# Patient Record
Sex: FEMALE | Race: WHITE | NOT HISPANIC OR LATINO | Employment: OTHER | URBAN - NONMETROPOLITAN AREA
[De-identification: names, ages, dates, MRNs, and addresses within clinical notes are randomized per-mention and may not be internally consistent; named-entity substitution may affect disease eponyms.]

---

## 2024-05-10 ENCOUNTER — APPOINTMENT (EMERGENCY)
Dept: RADIOLOGY | Facility: HOSPITAL | Age: 79
DRG: 811 | End: 2024-05-10
Payer: MEDICARE

## 2024-05-10 ENCOUNTER — APPOINTMENT (EMERGENCY)
Dept: CT IMAGING | Facility: HOSPITAL | Age: 79
DRG: 811 | End: 2024-05-10
Payer: MEDICARE

## 2024-05-10 ENCOUNTER — HOSPITAL ENCOUNTER (INPATIENT)
Facility: HOSPITAL | Age: 79
LOS: 7 days | Discharge: HOME | DRG: 811 | End: 2024-05-17
Attending: EMERGENCY MEDICINE | Admitting: INTERNAL MEDICINE
Payer: MEDICARE

## 2024-05-10 ENCOUNTER — APPOINTMENT (INPATIENT)
Dept: NON INVASIVE DIAGNOSTICS | Facility: HOSPITAL | Age: 79
DRG: 811 | End: 2024-05-10
Payer: MEDICARE

## 2024-05-10 ENCOUNTER — APPOINTMENT (INPATIENT)
Dept: MRI IMAGING | Facility: HOSPITAL | Age: 79
DRG: 811 | End: 2024-05-10
Payer: MEDICARE

## 2024-05-10 DIAGNOSIS — R29.898 WEAKNESS OF RIGHT LOWER EXTREMITY: Primary | ICD-10-CM

## 2024-05-10 DIAGNOSIS — D75.839 THROMBOCYTOSIS: ICD-10-CM

## 2024-05-10 DIAGNOSIS — I63.9 STROKE (HCC): ICD-10-CM

## 2024-05-10 DIAGNOSIS — D64.9 ANEMIA: ICD-10-CM

## 2024-05-10 DIAGNOSIS — D64.9 ACUTE ANEMIA: ICD-10-CM

## 2024-05-10 LAB
2HR DELTA HS TROPONIN: 2 NG/L
4HR DELTA HS TROPONIN: 2 NG/L
ABO GROUP BLD: NORMAL
ABO GROUP BLD: NORMAL
ALBUMIN SERPL BCP-MCNC: 4 G/DL (ref 3.5–5)
ALP SERPL-CCNC: 68 U/L (ref 34–104)
ALT SERPL W P-5'-P-CCNC: 11 U/L (ref 7–52)
ANION GAP SERPL CALCULATED.3IONS-SCNC: 10 MMOL/L (ref 4–13)
AORTIC ROOT: 2.9 CM
AORTIC VALVE MEAN VELOCITY: 14.4 M/S
APICAL FOUR CHAMBER EJECTION FRACTION: 57 %
ASCENDING AORTA: 3.1 CM
AST SERPL W P-5'-P-CCNC: 16 U/L (ref 13–39)
ATRIAL RATE: 88 BPM
AV AREA BY CONTINUOUS VTI: 1.8 CM2
AV AREA PEAK VELOCITY: 1.5 CM2
AV LVOT MEAN GRADIENT: 2 MMHG
AV LVOT PEAK GRADIENT: 4 MMHG
AV MEAN GRADIENT: 10 MMHG
AV PEAK GRADIENT: 20 MMHG
AV VALVE AREA: 1.78 CM2
AV VELOCITY RATIO: 0.46
BASOPHILS # BLD AUTO: 0.07 THOUSANDS/ÂΜL (ref 0–0.1)
BASOPHILS NFR BLD AUTO: 1 % (ref 0–1)
BILIRUB SERPL-MCNC: 0.29 MG/DL (ref 0.2–1)
BLD GP AB SCN SERPL QL: NEGATIVE
BSA FOR ECHO PROCEDURE: 1.52 M2
BUN SERPL-MCNC: 12 MG/DL (ref 5–25)
CALCIUM SERPL-MCNC: 9.4 MG/DL (ref 8.4–10.2)
CARDIAC TROPONIN I PNL SERPL HS: 4 NG/L
CARDIAC TROPONIN I PNL SERPL HS: 6 NG/L
CARDIAC TROPONIN I PNL SERPL HS: 6 NG/L
CHLORIDE SERPL-SCNC: 104 MMOL/L (ref 96–108)
CO2 SERPL-SCNC: 24 MMOL/L (ref 21–32)
CREAT SERPL-MCNC: 0.93 MG/DL (ref 0.6–1.3)
DOP CALC AO PEAK VEL: 2.22 M/S
DOP CALC AO VTI: 48.37 CM
DOP CALC LVOT AREA: 3.14 CM2
DOP CALC LVOT CARDIAC INDEX: 4.76 L/MIN/M2
DOP CALC LVOT CARDIAC OUTPUT: 7.24 L/MIN
DOP CALC LVOT DIAMETER: 2 CM
DOP CALC LVOT PEAK VEL VTI: 27.38 CM
DOP CALC LVOT PEAK VEL: 1.03 M/S
DOP CALC LVOT STROKE INDEX: 57.9 ML/M2
DOP CALC LVOT STROKE VOLUME: 85.97
E WAVE DECELERATION TIME: 168 MS
E/A RATIO: 0.95
EOSINOPHIL # BLD AUTO: 0.14 THOUSAND/ÂΜL (ref 0–0.61)
EOSINOPHIL NFR BLD AUTO: 2 % (ref 0–6)
ERYTHROCYTE [DISTWIDTH] IN BLOOD BY AUTOMATED COUNT: 17.8 % (ref 11.6–15.1)
FERRITIN SERPL-MCNC: 19 NG/ML (ref 11–307)
FOLATE SERPL-MCNC: >22.3 NG/ML
FRACTIONAL SHORTENING: 27 (ref 28–44)
GFR SERPL CREATININE-BSD FRML MDRD: 58 ML/MIN/1.73SQ M
GLUCOSE SERPL-MCNC: 126 MG/DL (ref 65–140)
HCT VFR BLD AUTO: 20.8 % (ref 34.8–46.1)
HCT VFR BLD AUTO: 24.5 % (ref 34.8–46.1)
HGB BLD-MCNC: 5.8 G/DL (ref 11.5–15.4)
HGB BLD-MCNC: 6.7 G/DL (ref 11.5–15.4)
IMM GRANULOCYTES # BLD AUTO: 0.04 THOUSAND/UL (ref 0–0.2)
IMM GRANULOCYTES NFR BLD AUTO: 0 % (ref 0–2)
INTERVENTRICULAR SEPTUM IN DIASTOLE (PARASTERNAL SHORT AXIS VIEW): 1.2 CM
INTERVENTRICULAR SEPTUM: 1 CM (ref 0.6–1.1)
IRON SATN MFR SERPL: 2 % (ref 15–50)
IRON SERPL-MCNC: 12 UG/DL (ref 50–212)
LAAS-AP2: 21.5 CM2
LAAS-AP4: 18.7 CM2
LDH SERPL-CCNC: 192 U/L (ref 140–271)
LEFT ATRIUM SIZE: 3 CM
LEFT ATRIUM VOLUME (MOD BIPLANE): 58 ML
LEFT ATRIUM VOLUME INDEX (MOD BIPLANE): 38.2 ML/M2
LEFT INTERNAL DIMENSION IN SYSTOLE: 2.7 CM (ref 2.1–4)
LEFT VENTRICLE DIASTOLIC VOLUME (MOD BIPLANE): 73 ML
LEFT VENTRICLE DIASTOLIC VOLUME INDEX (MOD BIPLANE): 48 ML/M2
LEFT VENTRICLE SYSTOLIC VOLUME (MOD BIPLANE): 27 ML
LEFT VENTRICLE SYSTOLIC VOLUME INDEX (MOD BIPLANE): 17.8 ML/M2
LEFT VENTRICULAR INTERNAL DIMENSION IN DIASTOLE: 3.7 CM (ref 3.5–6)
LEFT VENTRICULAR POSTERIOR WALL IN END DIASTOLE: 1.2 CM
LEFT VENTRICULAR STROKE VOLUME: 29 ML
LV EF: 63 %
LVSV (TEICH): 29 ML
LYMPHOCYTES # BLD AUTO: 1.21 THOUSANDS/ÂΜL (ref 0.6–4.47)
LYMPHOCYTES NFR BLD AUTO: 13 % (ref 14–44)
MCH RBC QN AUTO: 19.1 PG (ref 26.8–34.3)
MCHC RBC AUTO-ENTMCNC: 27.3 G/DL (ref 31.4–37.4)
MCV RBC AUTO: 70 FL (ref 82–98)
MONOCYTES # BLD AUTO: 0.55 THOUSAND/ÂΜL (ref 0.17–1.22)
MONOCYTES NFR BLD AUTO: 6 % (ref 4–12)
MV E'TISSUE VEL-LAT: 7 CM/S
MV E'TISSUE VEL-SEP: 7 CM/S
MV PEAK A VEL: 1.19 M/S
MV PEAK E VEL: 113 CM/S
MV STENOSIS PRESSURE HALF TIME: 49 MS
MV VALVE AREA P 1/2 METHOD: 4.49
NEUTROPHILS # BLD AUTO: 7.64 THOUSANDS/ÂΜL (ref 1.85–7.62)
NEUTS SEG NFR BLD AUTO: 78 % (ref 43–75)
NRBC BLD AUTO-RTO: 0 /100 WBCS
P AXIS: 66 DEGREES
PLATELET # BLD AUTO: 779 THOUSANDS/UL (ref 149–390)
PMV BLD AUTO: 9.1 FL (ref 8.9–12.7)
POTASSIUM SERPL-SCNC: 3.8 MMOL/L (ref 3.5–5.3)
PR INTERVAL: 132 MS
PROT SERPL-MCNC: 7.5 G/DL (ref 6.4–8.4)
QRS AXIS: 49 DEGREES
QRSD INTERVAL: 78 MS
QT INTERVAL: 352 MS
QTC INTERVAL: 425 MS
RBC # BLD AUTO: 3.51 MILLION/UL (ref 3.81–5.12)
RETICS # AUTO: NORMAL 10*3/UL (ref 14097–95744)
RETICS # CALC: 1.28 % (ref 0.37–1.87)
RH BLD: POSITIVE
RH BLD: POSITIVE
RIGHT ATRIUM AREA SYSTOLE A4C: 17.3 CM2
RIGHT VENTRICLE ID DIMENSION: 4 CM
SINOTUBULAR JUNCTION: 1.9 CM
SL CV LEFT ATRIUM LENGTH A2C: 5.9 CM
SL CV PED ECHO LEFT VENTRICLE DIASTOLIC VOLUME (MOD BIPLANE) 2D: 57 ML
SL CV PED ECHO LEFT VENTRICLE SYSTOLIC VOLUME (MOD BIPLANE) 2D: 28 ML
SL CV SINUS OF VALSALVA 2D: 2.8 CM
SODIUM SERPL-SCNC: 138 MMOL/L (ref 135–147)
SPECIMEN EXPIRATION DATE: NORMAL
STJ: 1.9 CM
T WAVE AXIS: 34 DEGREES
TIBC SERPL-MCNC: 682 UG/DL (ref 250–450)
TR MAX PG: 37 MMHG
TR PEAK VELOCITY: 3 M/S
TRICUSPID ANNULAR PLANE SYSTOLIC EXCURSION: 2.4 CM
TRICUSPID VALVE PEAK REGURGITATION VELOCITY: 3.04 M/S
UIBC SERPL-MCNC: 670 UG/DL (ref 155–355)
VENTRICULAR RATE: 88 BPM
VIT B12 SERPL-MCNC: 878 PG/ML (ref 180–914)
WBC # BLD AUTO: 9.65 THOUSAND/UL (ref 4.31–10.16)

## 2024-05-10 PROCEDURE — 74177 CT ABD & PELVIS W/CONTRAST: CPT

## 2024-05-10 PROCEDURE — 80053 COMPREHEN METABOLIC PANEL: CPT | Performed by: EMERGENCY MEDICINE

## 2024-05-10 PROCEDURE — 70498 CT ANGIOGRAPHY NECK: CPT

## 2024-05-10 PROCEDURE — 99223 1ST HOSP IP/OBS HIGH 75: CPT | Performed by: INTERNAL MEDICINE

## 2024-05-10 PROCEDURE — 71045 X-RAY EXAM CHEST 1 VIEW: CPT

## 2024-05-10 PROCEDURE — 82607 VITAMIN B-12: CPT | Performed by: INTERNAL MEDICINE

## 2024-05-10 PROCEDURE — 85045 AUTOMATED RETICULOCYTE COUNT: CPT | Performed by: INTERNAL MEDICINE

## 2024-05-10 PROCEDURE — 83615 LACTATE (LD) (LDH) ENZYME: CPT | Performed by: INTERNAL MEDICINE

## 2024-05-10 PROCEDURE — 93005 ELECTROCARDIOGRAM TRACING: CPT

## 2024-05-10 PROCEDURE — 85014 HEMATOCRIT: CPT | Performed by: EMERGENCY MEDICINE

## 2024-05-10 PROCEDURE — A9585 GADOBUTROL INJECTION: HCPCS | Performed by: EMERGENCY MEDICINE

## 2024-05-10 PROCEDURE — 84484 ASSAY OF TROPONIN QUANT: CPT | Performed by: EMERGENCY MEDICINE

## 2024-05-10 PROCEDURE — 36415 COLL VENOUS BLD VENIPUNCTURE: CPT | Performed by: EMERGENCY MEDICINE

## 2024-05-10 PROCEDURE — 70496 CT ANGIOGRAPHY HEAD: CPT

## 2024-05-10 PROCEDURE — 86923 COMPATIBILITY TEST ELECTRIC: CPT

## 2024-05-10 PROCEDURE — P9016 RBC LEUKOCYTES REDUCED: HCPCS

## 2024-05-10 PROCEDURE — 86901 BLOOD TYPING SEROLOGIC RH(D): CPT | Performed by: EMERGENCY MEDICINE

## 2024-05-10 PROCEDURE — 93010 ELECTROCARDIOGRAM REPORT: CPT | Performed by: INTERNAL MEDICINE

## 2024-05-10 PROCEDURE — 70553 MRI BRAIN STEM W/O & W/DYE: CPT

## 2024-05-10 PROCEDURE — 99285 EMERGENCY DEPT VISIT HI MDM: CPT

## 2024-05-10 PROCEDURE — 85025 COMPLETE CBC W/AUTO DIFF WBC: CPT | Performed by: EMERGENCY MEDICINE

## 2024-05-10 PROCEDURE — 82728 ASSAY OF FERRITIN: CPT | Performed by: INTERNAL MEDICINE

## 2024-05-10 PROCEDURE — 85018 HEMOGLOBIN: CPT | Performed by: EMERGENCY MEDICINE

## 2024-05-10 PROCEDURE — 83540 ASSAY OF IRON: CPT | Performed by: INTERNAL MEDICINE

## 2024-05-10 PROCEDURE — 86850 RBC ANTIBODY SCREEN: CPT | Performed by: EMERGENCY MEDICINE

## 2024-05-10 PROCEDURE — 83010 ASSAY OF HAPTOGLOBIN QUANT: CPT | Performed by: INTERNAL MEDICINE

## 2024-05-10 PROCEDURE — 83550 IRON BINDING TEST: CPT | Performed by: INTERNAL MEDICINE

## 2024-05-10 PROCEDURE — 30233N1 TRANSFUSION OF NONAUTOLOGOUS RED BLOOD CELLS INTO PERIPHERAL VEIN, PERCUTANEOUS APPROACH: ICD-10-PCS | Performed by: EMERGENCY MEDICINE

## 2024-05-10 PROCEDURE — 86900 BLOOD TYPING SEROLOGIC ABO: CPT | Performed by: EMERGENCY MEDICINE

## 2024-05-10 PROCEDURE — 99291 CRITICAL CARE FIRST HOUR: CPT | Performed by: EMERGENCY MEDICINE

## 2024-05-10 PROCEDURE — 82746 ASSAY OF FOLIC ACID SERUM: CPT | Performed by: INTERNAL MEDICINE

## 2024-05-10 PROCEDURE — 93306 TTE W/DOPPLER COMPLETE: CPT | Performed by: INTERNAL MEDICINE

## 2024-05-10 PROCEDURE — 93306 TTE W/DOPPLER COMPLETE: CPT

## 2024-05-10 PROCEDURE — 36430 TRANSFUSION BLD/BLD COMPNT: CPT

## 2024-05-10 RX ORDER — ATORVASTATIN CALCIUM 40 MG/1
40 TABLET, FILM COATED ORAL EVERY EVENING
Status: DISCONTINUED | OUTPATIENT
Start: 2024-05-10 | End: 2024-05-17 | Stop reason: HOSPADM

## 2024-05-10 RX ORDER — GADOBUTROL 604.72 MG/ML
5 INJECTION INTRAVENOUS
Status: COMPLETED | OUTPATIENT
Start: 2024-05-10 | End: 2024-05-10

## 2024-05-10 RX ORDER — ASPIRIN 81 MG/1
81 TABLET, CHEWABLE ORAL DAILY
Status: DISCONTINUED | OUTPATIENT
Start: 2024-05-11 | End: 2024-05-17 | Stop reason: HOSPADM

## 2024-05-10 RX ORDER — ACETAMINOPHEN 325 MG/1
650 TABLET ORAL EVERY 4 HOURS PRN
Status: DISCONTINUED | OUTPATIENT
Start: 2024-05-10 | End: 2024-05-17 | Stop reason: HOSPADM

## 2024-05-10 RX ADMIN — GADOBUTROL 5 ML: 604.72 INJECTION INTRAVENOUS at 18:34

## 2024-05-10 RX ADMIN — ATORVASTATIN CALCIUM 40 MG: 40 TABLET, FILM COATED ORAL at 17:42

## 2024-05-10 RX ADMIN — IOHEXOL 100 ML: 350 INJECTION, SOLUTION INTRAVENOUS at 10:42

## 2024-05-10 NOTE — ED PROVIDER NOTES
"History  Chief Complaint   Patient presents with    Extremity Weakness     R leg and L hand since 5/6     Patient is a 79-year-old female who has had no follow-up with primary care provider/physician in the past who is presenting to the emergency room with family reporting that patient suffered from right-sided weakness since Monday while on a cruise.  Daughter who is bedside provides most the history reports that the patient had some upper extremity stiffness bilaterally on Sunday evening.  She woke on Monday and had some nausea and vomiting and then was able to go to breakfast but had difficulty cutting her food and when she stood up she fell over to with the daughter reports as \"the right.\"  Since that time she was in a wheelchair.  Returning home she has been needing to use a walker because she reports that her right lower extremity is \"dragging.\"  This is not normal for the patient who is normally independent without a walker.      History provided by:  Patient and relative  Extremity Weakness  Location:  Right lower extremity  Associated symptoms: no headaches        None       Past Medical History:   Diagnosis Date    Cataracts, bilateral        Past Surgical History:   Procedure Laterality Date    CATARACT EXTRACTION Bilateral        No family history on file.  I have reviewed and agree with the history as documented.    E-Cigarette/Vaping    E-Cigarette Use Never User      E-Cigarette/Vaping Substances     Social History     Tobacco Use    Smoking status: Never    Smokeless tobacco: Never   Vaping Use    Vaping status: Never Used   Substance Use Topics    Alcohol use: Never    Drug use: Never       Review of Systems   Constitutional: Negative.    HENT: Negative.     Respiratory: Negative.     Cardiovascular: Negative.    Gastrointestinal: Negative.    Genitourinary: Negative.  Negative for frequency, hematuria and urgency.   Musculoskeletal:  Positive for extremity weakness. Negative for back pain. "   Neurological:  Positive for weakness. Negative for dizziness, tremors, seizures, syncope, facial asymmetry, speech difficulty, light-headedness, numbness and headaches.       Physical Exam  Physical Exam  Vitals and nursing note reviewed. Exam conducted with a chaperone present (Latonya, Tech Partner).   Constitutional:       General: She is awake.      Appearance: Normal appearance. She is well-developed. She is not ill-appearing or toxic-appearing.   HENT:      Head: Normocephalic and atraumatic.      Right Ear: External ear normal.      Left Ear: External ear normal.      Nose: Nose normal.      Mouth/Throat:      Mouth: Mucous membranes are moist.   Eyes:      General: Lids are normal. No scleral icterus.     Extraocular Movements: Extraocular movements intact.      Pupils: Pupils are equal, round, and reactive to light.   Cardiovascular:      Rate and Rhythm: Normal rate and regular rhythm.      Heart sounds: Normal heart sounds. No murmur heard.  Pulmonary:      Effort: Pulmonary effort is normal. No respiratory distress.      Breath sounds: Normal breath sounds. No wheezing, rhonchi or rales.   Abdominal:      General: Abdomen is flat.      Palpations: Abdomen is soft.   Genitourinary:     Rectum: Guaiac result negative.   Musculoskeletal:         General: No swelling, tenderness or deformity. Normal range of motion.      Cervical back: Normal range of motion and neck supple.      Lumbar back: Normal.      Comments: No lumbar tenderness or pain.   Skin:     General: Skin is warm and dry.      Coloration: Skin is not jaundiced or pale.      Findings: No rash.   Neurological:      Mental Status: She is alert and oriented to person, place, and time.      Cranial Nerves: No cranial nerve deficit.      Motor: Weakness present.      Coordination: Coordination abnormal.      Comments: Mild ataxia and weakness of the right lower extremity.   Psychiatric:         Attention and Perception: Attention normal.          Mood and Affect: Mood normal.         Speech: Speech normal.         Behavior: Behavior normal.         Vital Signs  ED Triage Vitals [05/10/24 0909]   Temperature Pulse Respirations Blood Pressure SpO2   98.4 °F (36.9 °C) (!) 112 18 (!) 174/78 97 %      Temp Source Heart Rate Source Patient Position - Orthostatic VS BP Location FiO2 (%)   Temporal Monitor Lying Right arm --      Pain Score       7           Vitals:    05/10/24 0915 05/10/24 0930 05/10/24 1015 05/10/24 1030   BP: 163/59 158/72 163/71 157/56   Pulse: 101 95 93 90   Patient Position - Orthostatic VS:             Visual Acuity  Visual Acuity      Flowsheet Row Most Recent Value   L Pupil Size (mm) 3   R Pupil Size (mm) 3            ED Medications  Medications   iohexol (OMNIPAQUE) 350 MG/ML injection (MULTI-DOSE) 100 mL (100 mL Intravenous Given 5/10/24 1042)       Diagnostic Studies  Results Reviewed       Procedure Component Value Units Date/Time    HS Troponin I 2hr [[id number]]  (Normal) Collected: 05/10/24 1150    Lab Status: Final result Specimen: Blood from Arm, Left Updated: 05/10/24 1229     hs TnI 2hr 6 ng/L      Delta 2hr hsTnI 2 ng/L     HS Troponin I 4hr [[id number]]     Lab Status: No result Specimen: Blood     Hemoglobin and hematocrit, blood [[id number]]  (Abnormal) Collected: 05/10/24 1053    Lab Status: Final result Specimen: Blood from Arm, Right Updated: 05/10/24 1107     Hemoglobin 5.8 g/dL      Hematocrit 20.8 %     HS Troponin 0hr (reflex protocol) [[id number]]  (Normal) Collected: 05/10/24 0959    Lab Status: Final result Specimen: Blood from Arm, Right Updated: 05/10/24 1031     hs TnI 0hr 4 ng/L     Comprehensive metabolic panel [[id number]] Collected: 05/10/24 0959    Lab Status: Final result Specimen: Blood from Arm, Right Updated: 05/10/24 1025     Sodium 138 mmol/L      Potassium 3.8 mmol/L      Chloride 104 mmol/L      CO2 24 mmol/L      ANION GAP 10 mmol/L      BUN 12 mg/dL      Creatinine 0.93 mg/dL      Glucose 126 mg/dL       Calcium 9.4 mg/dL      AST 16 U/L      ALT 11 U/L      Alkaline Phosphatase 68 U/L      Total Protein 7.5 g/dL      Albumin 4.0 g/dL      Total Bilirubin 0.29 mg/dL      eGFR 58 ml/min/1.73sq m     Narrative:      National Kid[employer] Foundation guidelines for Chronic Kidney Disease (CKD):     Stage 1 with normal or high GFR (GFR > 90 mL/min/1.73 square meters)    Stage 2 Mild CKD (GFR = 60-89 mL/min/1.73 square meters)    Stage 3A Moderate CKD (GFR = 45-59 mL/min/1.73 square meters)    Stage 3B Moderate CKD (GFR = 30-44 mL/min/1.73 square meters)    Stage 4 Severe CKD (GFR = 15-29 mL/min/1.73 square meters)    Stage 5 End Stage CKD (GFR <15 mL/min/1.73 square meters)  Note: GFR calculation is accurate only with a steady state creatinine    CBC and differential [[id number]]  (Abnormal) Collected: 05/10/24 0959    Lab Status: Final result Specimen: Blood from Arm, Right Updated: 05/10/24 1020     WBC 9.65 Thousand/uL      RBC 3.51 Million/uL      Hemoglobin 6.7 g/dL      Hematocrit 24.5 %      MCV 70 fL      MCH 19.1 pg      MCHC 27.3 g/dL      RDW 17.8 %      MPV 9.1 fL      Platelets 779 Thousands/uL      nRBC 0 /100 WBCs      Segmented % 78 %      Immature Grans % 0 %      Lymphocytes % 13 %      Monocytes % 6 %      Eosinophils Relative 2 %      Basophils Relative 1 %      Absolute Neutrophils 7.64 Thousands/µL      Absolute Immature Grans 0.04 Thousand/uL      Absolute Lymphocytes 1.21 Thousands/µL      Absolute Monocytes 0.55 Thousand/µL      Eosinophils Absolute 0.14 Thousand/µL      Basophils Absolute 0.07 Thousands/µL                    CTA head and neck with and without contrast   Final Result by Willis Robison MD (05/10 4178)      CT head:   -Hypodensities within the left parietal lobe and left cerebellar hemisphere may represent age-indeterminate infarct. Recommend MRI of the brain for further evaluation.   -Chronic microangiopathic changes.      CTA head:   -No large vessel occlusion. Question  moderate to severe right and moderate left supraclinoid stenosis, possibly exaggerated due to motion.   -Focal moderate to severe stenosis involving the proximal right P2 segment of the PCA.      CTA neck:   -Severe stenosis at the origin of the left vertebral artery.   - Approximately 50% left and less than 50% right stenosis of the proximal internal carotid arteries due to atherosclerosis.      Other:   -Spondylosis of the cervical spine including grade 1 listhesis as above.   -Mediastinal and bilateral hilar adenopathy, nonspecific. Clinical correlation and dedicated chest CT advised.         The study was marked in EPIC for immediate notification.               Workstation performed: [id number]         CT abdomen pelvis with contrast   Final Result by Angelo Dunlap MD (05/10 1113)      No acute finding in the abdomen or pelvis.         Workstation performed: [id number]         XR chest 1 view portable   ED Interpretation by Figueroa Robles DO (05/10 1033)   No active disease      MRI ED order    (Results Pending)              Procedures  Procedures         ED Course  ED Course as of 05/10/24 1240   Fri May 10, 2024   1100 Hemoglobin(!): 6.7   1107 Hemoglobin retest is 5.8.  Guaiac negative  No clear etiology for bleeding at this time.  Will consent patient for blood transfusion.   1137 No acute findings in CT of abdomen pelvis.   1234 Patient with left parietal lobe and left cerebellar hemispheric indeterminate infarcts consistent with patient's symptomatology.  Will obtain MRI as recommended and refer patient for admission.  Will hold on load with aspirin and Plavix secondary to anemia of unclear etiology.   1237 Jie findings with patient and her family.  Will refer patient for admission.                               SBIRT 20yo+      Flowsheet Row Most Recent Value   Initial Alcohol Screen: US AUDIT-C     1. How often do you have a drink containing alcohol? 0 Filed at: 05/10/2024 1118   2. How  many drinks containing alcohol do you have on a typical day you are drinking?  0 Filed at: 05/10/2024 1118   3a. Male UNDER 65: How often do you have five or more drinks on one occasion? 0 Filed at: 05/10/2024 1118   3b. FEMALE Any Age, or MALE 65+: How often do you have 4 or more drinks on one occassion? 0 Filed at: 05/10/2024 1118   Audit-C Score 0 Filed at: 05/10/2024 1118   BRUNA: How many times in the past year have you...    Used an illegal drug or used a prescription medication for non-medical reasons? Never Filed at: 05/10/2024 1118                      Medical Decision Making  Patient presented to the emergency department and a MSE was performed. The patient was evaluated for complaint related to acute right lower extremity weakness.  Patient is potentially at risk for, but not limited to, thrombotic stroke, embolic stroke, hemorrhagic stroke, hypertensive emergency, hypoglycemic episode, or migraine variant.  Several of these diagnoses have been evaluated and ruled out by history and physical.  As needed, patient will be further evaluated with laboratory and imaging studies.  Higher level diagnostics, such as CT imaging or ultrasound, may also be required.  Please see work-up portion of the note for further evaluation of patient's risk.  Socioeconomic factors were also considered as part of the decision-making process.  Unless otherwise stated in the chart or patient is admitted as elsewhere documented, any previously prescribed medications will be maintained.        Problems Addressed:  Anemia: acute illness or injury  Stroke (HCC): acute illness or injury  Weakness of right lower extremity: acute illness or injury    Amount and/or Complexity of Data Reviewed  Labs: ordered. Decision-making details documented in ED Course.  Radiology: ordered and independent interpretation performed.    Risk  Prescription drug management.  Decision regarding hospitalization.  Risk Details: Patient presented to the ED and  was found to be critically ill as demonstrated by the clinical history and primary physical evaluation. Pt had demonstrative findings and / or derangements of vital signs indicative for severe illness or injury. I personally performed bedside history and evaluation. Interventions to address these clinical needs were ordered/performed. These included, but not necessarily limited to, the ordering and subsequent review of lab studies, imaging and EKG.  Please see chart with regards to specific resuscitative interventions and diagnostics.     Due to a probability of clinically significant, life or limb threatening condition, the patient required my highest level of care, intervention and attention. I personally spent the documented time directly managing the patient. The critical care time included obtaining a history, examining the patient, ordering and review of studies, arranging urgent treatment with development of a management plan, evaluation of patient's response to treatment, reassessment, and, if warranted, discussions with other providers or consultants. Documentation to the medical record for continuity of care was also required.     Patients records pertinent to the emergent presenting condition were reviewed as available.  Family was updated as available and appropriate.  This critical care time was performed to assess and manage the high probability of imminent, life-threatening deterioration that could result in multi-organ failure if not addressed. It was exclusive of separately billable procedures and treating other patients and teaching time. Please see MDM section and the rest of the note for further information on patient assessment, reassessment, interventions and treatment.    Total time was 35 mins exclusive of separate billable procedures.    Patient presented to the emergency department and a MSE was performed. The patient was evaluated and diagnosed with acute right lower extremity weakness  with evidence of stroke as well as anemia of unclear etiology.. This is a new issue that will require additional planned work-up and treatment in a hospitalized setting. As may have been required as part of this evaluation, clinical laboratory test, radiology imaging and medical testing (I.e. EKG) were ordered as necessitated by the patient's presentation. I independently reviewed these studies, imaging and testing. This patient's case is considered to be a considerable risk secondary to the above listed disease process and poses a threat to the patient's well-being and baseline function. Further in-patient diagnostic testing and management, which may include the administration of parenteral medications, is required.                     Disposition  Final diagnoses:   Weakness of right lower extremity   Anemia   Stroke (HCC)     Time reflects when diagnosis was documented in both MDM as applicable and the Disposition within this note       Time User Action Codes Description Comment    5/10/2024 11:22 AM Figueroa Robles [R29.898] Weakness of right lower extremity     5/10/2024 11:22 AM Figueroa Robles [D64.9] Anemia     5/10/2024 12:38 PM Figueroa Robles [I63.9] Stroke (HCC)           ED Disposition       ED Disposition   Admit    Condition   Stable    Date/Time   Fri May 10, 2024 1122    Comment                  Follow-up Information    None         Patient's Medications    No medications on file       No discharge procedures on file.    PDMP Review       None            ED Provider  Electronically Signed by             Figueroa Robles DO  05/10/24 4212

## 2024-05-10 NOTE — H&P
"[facility]  H&P  Name: Agatha Villeda 79 y.o. female I MRN: [id number]  Unit/Bed#: ED 10 I Date of Admission: 5/10/2024   Date of Service: 5/10/2024 I Hospital Day: 0      Assessment/Plan   * Right leg weakness  Assessment & Plan  Presented with R leg weakness that began 3 days ago that has not improved  Possibly 2/2 to acute stroke  Ct scan revealed  \"Hypodensities within the left parietal lobe and left cerebellar hemisphere may represent age-indeterminate infarct. Recommend MRI of the brain for further evaluation\"  Will admit for stroke pathway  Asa, statin  Pt/ot  MRI brain  Neuro consult  Telemetry monitoring      Acute anemia  Assessment & Plan  Noted to have hgb 5.8  No overt bleeding  No previous hgb so may not be acute considering low mcv  Will receive 2 units prbc  Will consult gi for possible colonoscopy/egd  Will check iron panel if iron def  Initiate iv venofer           VTE Prophylaxis: Pharmacologic VTE Prophylaxis contraindicated due to acute anemia   / sequential compression device   Code Status: full code  POLST: There is no POLST form on file for this patient (pre-hospital)  Discussion with family: pt    Anticipated Length of Stay:  Patient will be admitted on an Inpatient basis with an anticipated length of stay of  > 2 midnights.   Justification for Hospital Stay: r leg weakness    Total Time for Visit, including Counseling / Coordination of Care: 60 minutes.  Greater than 50% of this total time spent on direct patient counseling and coordination of care.    Chief Complaint:   r leg weakness    History of Present Illness:    Agatha Villeda is a 79 y.o. female with no significant PMH who initally presented with r leg weakness. Reports symptoms for the past 3 days. Denies any other acute complaints. Denies fevers, chills, sob, cough, confusion slurred speech, numbness or any other complaint  Review of Systems:    Review of Systems   Constitutional:  Negative for " activity change, appetite change, chills, diaphoresis, fever and unexpected weight change.   HENT:  Negative for congestion, facial swelling and rhinorrhea.    Eyes:  Negative for photophobia and visual disturbance.   Respiratory:  Negative for cough, shortness of breath and wheezing.    Cardiovascular:  Negative for chest pain and palpitations.   Gastrointestinal:  Negative for abdominal pain, blood in stool, constipation, diarrhea, nausea and vomiting.   Genitourinary:  Negative for decreased urine volume, difficulty urinating, dysuria, flank pain, frequency, hematuria and urgency.   Musculoskeletal:  Negative for arthralgias, back pain, joint swelling and myalgias.   Neurological:  Positive for weakness. Negative for dizziness, syncope, facial asymmetry, light-headedness, numbness and headaches.   Psychiatric/Behavioral:  Negative for confusion and decreased concentration. The patient is not nervous/anxious.        Past Medical and Surgical History:     Past Medical History:   Diagnosis Date    Cataracts, bilateral        Past Surgical History:   Procedure Laterality Date    CATARACT EXTRACTION Bilateral        Meds/Allergies:    Prior to Admission medications    Not on File     I have reviewed home medications with patient personally.    Allergies: No Known Allergies    Social History:     Marital Status:    O  Patient Pre-hospital Living Situation: home  Patient Pre-hospital Level of Mobility: independent  Patient Pre-hospital Diet Restrictions: none  Substance Use History:   Social History     Substance and Sexual Activity   Alcohol Use Never     Social History     Tobacco Use   Smoking Status Never   Smokeless Tobacco Never     Social History     Substance and Sexual Activity   Drug Use Never       Family History:    No family history on file.    Physical Exam:     Vitals:   Blood Pressure: 159/71 (05/10/24 1310)  Pulse: 89 (05/10/24 1310)  Temperature: 98.7 °F (37.1 °C) (05/10/24 1310)  Temp Source:  Oral (05/10/24 1257)  Respirations: 18 (05/10/24 1310)  Weight - Scale: 56.2 kg (124 lb) (05/10/24 0909)  SpO2: 96 % (05/10/24 1310)    Physical Exam  Constitutional:       General: She is not in acute distress.     Appearance: She is well-developed. She is not diaphoretic.   HENT:      Head: Normocephalic and atraumatic.      Nose: Nose normal.      Mouth/Throat:      Pharynx: No oropharyngeal exudate.   Eyes:      General: No scleral icterus.        Right eye: No discharge.         Left eye: No discharge.      Conjunctiva/sclera: Conjunctivae normal.      Pupils: Pupils are equal, round, and reactive to light.   Neck:      Thyroid: No thyromegaly.      Vascular: No JVD.   Cardiovascular:      Rate and Rhythm: Normal rate and regular rhythm.      Heart sounds: Normal heart sounds. No murmur heard.     No friction rub. No gallop.   Pulmonary:      Effort: Pulmonary effort is normal. No respiratory distress.      Breath sounds: Normal breath sounds. No wheezing or rales.   Chest:      Chest wall: No tenderness.   Abdominal:      General: Bowel sounds are normal. There is no distension.      Palpations: Abdomen is soft.      Tenderness: There is no abdominal tenderness. There is no guarding or rebound.   Musculoskeletal:         General: No tenderness or deformity. Normal range of motion.      Cervical back: Normal range of motion and neck supple.   Skin:     General: Skin is warm and dry.      Findings: No erythema or rash.   Neurological:      Mental Status: She is alert and oriented to person, place, and time.      Cranial Nerves: No cranial nerve deficit.      Sensory: No sensory deficit.      Motor: No abnormal muscle tone.      Coordination: Coordination normal.           Additional Data:     Lab Results: I have personally reviewed pertinent reports.      Results from last 7 days   Lab Units 05/10/24  1053 05/10/24  0959   WBC Thousand/uL  --  9.65   HEMOGLOBIN g/dL 5.8* 6.7*   HEMATOCRIT % 20.8* 24.5*    PLATELETS Thousands/uL  --  779*   SEGS PCT %  --  78*   LYMPHO PCT %  --  13*   MONO PCT %  --  6   EOS PCT %  --  2     Results from last 7 days   Lab Units 05/10/24  0959   SODIUM mmol/L 138   POTASSIUM mmol/L 3.8   CHLORIDE mmol/L 104   CO2 mmol/L 24   BUN mg/dL 12   CREATININE mg/dL 0.93   ANION GAP mmol/L 10   CALCIUM mg/dL 9.4   ALBUMIN g/dL 4.0   TOTAL BILIRUBIN mg/dL 0.29   ALK PHOS U/L 68   ALT U/L 11   AST U/L 16   GLUCOSE RANDOM mg/dL 126                       Imaging: I have personally reviewed pertinent reports.      CTA head and neck with and without contrast   Final Result by Willis Robison MD (05/10 1218)      CT head:   -Hypodensities within the left parietal lobe and left cerebellar hemisphere may represent age-indeterminate infarct. Recommend MRI of the brain for further evaluation.   -Chronic microangiopathic changes.      CTA head:   -No large vessel occlusion. Question moderate to severe right and moderate left supraclinoid stenosis, possibly exaggerated due to motion.   -Focal moderate to severe stenosis involving the proximal right P2 segment of the PCA.      CTA neck:   -Severe stenosis at the origin of the left vertebral artery.   - Approximately 50% left and less than 50% right stenosis of the proximal internal carotid arteries due to atherosclerosis.      Other:   -Spondylosis of the cervical spine including grade 1 listhesis as above.   -Mediastinal and bilateral hilar adenopathy, nonspecific. Clinical correlation and dedicated chest CT advised.         The study was marked in EPIC for immediate notification.               Workstation performed: [id number]         CT abdomen pelvis with contrast   Final Result by Angelo Dunlap MD (05/10 1113)      No acute finding in the abdomen or pelvis.         Workstation performed: [id number]         XR chest 1 view portable   ED Interpretation by Figueroa Robles DO (05/10 1033)   No active disease      MRI brain wo contrast    (Results  Pending)       EKG, Pathology, and Other Studies Reviewed on Admission:   EKG: reviewd    Allscripts / Epic Records Reviewed: Yes     ** Please Note: This note has been constructed using a voice recognition system. **

## 2024-05-10 NOTE — ASSESSMENT & PLAN NOTE
Noted to have hgb 5.8  No overt bleeding  No previous hgb so may not be acute considering low mcv  Will receive 2 units prbc  Will consult gi for possible colonoscopy/egd  Will check iron panel if iron def  Initiate iv venofer

## 2024-05-10 NOTE — ASSESSMENT & PLAN NOTE
"Presented with R leg weakness that began 3 days ago that has not improved  Possibly 2/2 to acute stroke  Ct scan revealed  \"Hypodensities within the left parietal lobe and left cerebellar hemisphere may represent age-indeterminate infarct. Recommend MRI of the brain for further evaluation\"  Will admit for stroke pathway  Asa, statin  Pt/ot  MRI brain  Neuro consult  Telemetry monitoring    "

## 2024-05-11 PROBLEM — I63.9 ACUTE CVA (CEREBROVASCULAR ACCIDENT) (HCC): Status: ACTIVE | Noted: 2024-05-10

## 2024-05-11 LAB
ABO GROUP BLD BPU: NORMAL
ABO GROUP BLD BPU: NORMAL
ANION GAP SERPL CALCULATED.3IONS-SCNC: 8 MMOL/L (ref 4–13)
BASOPHILS # BLD AUTO: 0.08 THOUSANDS/ÂΜL (ref 0–0.1)
BASOPHILS NFR BLD AUTO: 1 % (ref 0–1)
BPU ID: NORMAL
BPU ID: NORMAL
BUN SERPL-MCNC: 9 MG/DL (ref 5–25)
CALCIUM SERPL-MCNC: 9.1 MG/DL (ref 8.4–10.2)
CHLORIDE SERPL-SCNC: 105 MMOL/L (ref 96–108)
CHOLEST SERPL-MCNC: 154 MG/DL
CO2 SERPL-SCNC: 24 MMOL/L (ref 21–32)
CREAT SERPL-MCNC: 0.8 MG/DL (ref 0.6–1.3)
CROSSMATCH: NORMAL
CROSSMATCH: NORMAL
EOSINOPHIL # BLD AUTO: 0.31 THOUSAND/ÂΜL (ref 0–0.61)
EOSINOPHIL NFR BLD AUTO: 3 % (ref 0–6)
ERYTHROCYTE [DISTWIDTH] IN BLOOD BY AUTOMATED COUNT: 19.3 % (ref 11.6–15.1)
EST. AVERAGE GLUCOSE BLD GHB EST-MCNC: 111 MG/DL
GFR SERPL CREATININE-BSD FRML MDRD: 70 ML/MIN/1.73SQ M
GLUCOSE SERPL-MCNC: 96 MG/DL (ref 65–140)
HAPTOGLOB SERPL-MCNC: 327 MG/DL (ref 42–346)
HBA1C MFR BLD: 5.5 %
HCT VFR BLD AUTO: 31.3 % (ref 34.8–46.1)
HDLC SERPL-MCNC: 49 MG/DL
HGB BLD-MCNC: 9.2 G/DL (ref 11.5–15.4)
IMM GRANULOCYTES # BLD AUTO: 0.04 THOUSAND/UL (ref 0–0.2)
IMM GRANULOCYTES NFR BLD AUTO: 0 % (ref 0–2)
LDLC SERPL CALC-MCNC: 88 MG/DL (ref 0–100)
LYMPHOCYTES # BLD AUTO: 1.19 THOUSANDS/ÂΜL (ref 0.6–4.47)
LYMPHOCYTES NFR BLD AUTO: 13 % (ref 14–44)
MCH RBC QN AUTO: 21.6 PG (ref 26.8–34.3)
MCHC RBC AUTO-ENTMCNC: 29.4 G/DL (ref 31.4–37.4)
MCV RBC AUTO: 74 FL (ref 82–98)
MONOCYTES # BLD AUTO: 0.63 THOUSAND/ÂΜL (ref 0.17–1.22)
MONOCYTES NFR BLD AUTO: 7 % (ref 4–12)
NEUTROPHILS # BLD AUTO: 6.78 THOUSANDS/ÂΜL (ref 1.85–7.62)
NEUTS SEG NFR BLD AUTO: 76 % (ref 43–75)
NRBC BLD AUTO-RTO: 0 /100 WBCS
PLATELET # BLD AUTO: 614 THOUSANDS/UL (ref 149–390)
PMV BLD AUTO: 8.9 FL (ref 8.9–12.7)
POTASSIUM SERPL-SCNC: 3.7 MMOL/L (ref 3.5–5.3)
RBC # BLD AUTO: 4.26 MILLION/UL (ref 3.81–5.12)
SODIUM SERPL-SCNC: 137 MMOL/L (ref 135–147)
TRIGL SERPL-MCNC: 87 MG/DL
UNIT DISPENSE STATUS: NORMAL
UNIT DISPENSE STATUS: NORMAL
UNIT PRODUCT CODE: NORMAL
UNIT PRODUCT CODE: NORMAL
UNIT PRODUCT VOLUME: 350 ML
UNIT PRODUCT VOLUME: 350 ML
UNIT RH: NORMAL
UNIT RH: NORMAL
WBC # BLD AUTO: 9.03 THOUSAND/UL (ref 4.31–10.16)

## 2024-05-11 PROCEDURE — 83036 HEMOGLOBIN GLYCOSYLATED A1C: CPT | Performed by: INTERNAL MEDICINE

## 2024-05-11 PROCEDURE — 97163 PT EVAL HIGH COMPLEX 45 MIN: CPT

## 2024-05-11 PROCEDURE — 97530 THERAPEUTIC ACTIVITIES: CPT

## 2024-05-11 PROCEDURE — G0427 INPT/ED TELECONSULT70: HCPCS | Performed by: PSYCHIATRY & NEUROLOGY

## 2024-05-11 PROCEDURE — 85025 COMPLETE CBC W/AUTO DIFF WBC: CPT | Performed by: INTERNAL MEDICINE

## 2024-05-11 PROCEDURE — 99232 SBSQ HOSP IP/OBS MODERATE 35: CPT | Performed by: INTERNAL MEDICINE

## 2024-05-11 PROCEDURE — 80061 LIPID PANEL: CPT | Performed by: INTERNAL MEDICINE

## 2024-05-11 PROCEDURE — 80048 BASIC METABOLIC PNL TOTAL CA: CPT | Performed by: INTERNAL MEDICINE

## 2024-05-11 PROCEDURE — 92610 EVALUATE SWALLOWING FUNCTION: CPT

## 2024-05-11 PROCEDURE — 97116 GAIT TRAINING THERAPY: CPT

## 2024-05-11 RX ORDER — CLOPIDOGREL BISULFATE 75 MG/1
75 TABLET ORAL DAILY
Status: DISCONTINUED | OUTPATIENT
Start: 2024-05-11 | End: 2024-05-17 | Stop reason: HOSPADM

## 2024-05-11 RX ADMIN — ASPIRIN 81 MG CHEWABLE TABLET 81 MG: 81 TABLET CHEWABLE at 08:03

## 2024-05-11 RX ADMIN — CLOPIDOGREL 75 MG: 75 TABLET ORAL at 09:54

## 2024-05-11 RX ADMIN — IRON SUCROSE 200 MG: 20 INJECTION, SOLUTION INTRAVENOUS at 09:57

## 2024-05-11 RX ADMIN — ATORVASTATIN CALCIUM 40 MG: 40 TABLET, FILM COATED ORAL at 17:35

## 2024-05-11 NOTE — PHYSICAL THERAPY NOTE
PHYSICAL THERAPY EVALUATION  NAME:  Agatha Villeda  DATE: 24    AGE:   79 y.o.  Mrn:   [id number]  ADMIT DX:  Anemia [D64.9]  Stroke (HCC) [I63.9]  Lower extremity weakness [R29.898]  Weakness of right lower extremity [R29.898]    Past Medical History:   Diagnosis Date    Cataracts, bilateral      Length Of Stay: 1  Performed at least 2 patient identifiers during session: Name and Birthday  PHYSICAL THERAPY EVALUATION :    24   PT Last Visit   PT Visit Date 24   Note Type   Note type Evaluation   Pain Assessment   Pain Assessment Tool 0-10   Pain Score No Pain   Restrictions/Precautions   Other Precautions Chair Alarm;Bed Alarm;Fall Risk   Home Living   Type of Home House  (5 OMAYRA B HRs and > 5 OMAYRA B HRs from back.)   Home Layout One level;Performs ADLs on one level;Able to live on main level with bedroom/bathroom   Bathroom Shower/Tub Walk-in shower  (also has tub shower. used tub shower prior to onset of symptoms)   Bathroom Toilet Raised   Bathroom Equipment Built-in shower seat;Shower chair;Grab bars in shower;Commode  (built in shower seat in walk in shower and has a shower chair but wasn't using in tub shower. has BSC available if needed)   Home Equipment   (no AD PTA. was borrowing a rollator from a friend)   Additional Comments Reports living in a 1SH with OMAYRA and not using an AD PTA.   Prior Function   Level of [place] Independent with ADLs;Independent with functional mobility;Independent with IADLS   Lives With Alone   Receives Help From Family  (sister lives up the street)   IADLs Independent with meal prep;Independent with medication management;Family/Friend/Other provides transportation  (sister provides transportation)   Falls in the last 6 months 1 to 4  (fell top her right into a family member)   Comments Reports being independent with mobility, ADLs and IADLs.   General   Additional Pertinent History MRI brain showin.  Multifocal acute/subacute infarcts  "involving bilateral frontoparietal and occipital lobes as well as left cerebellum suggesting embolic etiology. There is focal petechial hemorrhage within the left parietal lobe infarct. No mass effect.  2.  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 Chronic microangiopathic change.  3.  Right mastoid effusion. Correlate for mastoiditis.   Family/Caregiver Present No   Cognition   Orientation Level Oriented X4   Following Commands Follows one step commands without difficulty   Subjective   Subjective \"I was on a cruise when I got sick.\"   RUE Assessment   RUE Assessment WFL  (4-/5)   LUE Assessment   LUE Assessment WFL  (difficulty closing left hand. 4/5 except hand 3-/5)   RLE Assessment   RLE Assessment WFL  (3+/5 ankle and 4-/5 hip and knee)   LLE Assessment   LLE Assessment WFL  (4/5)   Coordination   Movements are Fluid and Coordinated 0   Coordination and Movement Description impaired right LE with alt toe tapping and heel to shin, slowed compared to left. slowed thumb opposition bilaterally   Finger to Nose & Finger to Finger  Intact   Heel to [place] Impaired  (right LE)   Rapid Alternating Movements Impaired  (slowed on right)   Light Touch   RLE Light Touch Grossly intact   LLE Light Touch Grossly intact   Proprioception   RLE Proprioception Impaired   LLE Proprioception Grossly Intact   Bed Mobility   Supine to Sit 6  Modified independent   Additional items Increased time required   Additional Comments HOB flat without bedrail. inc time. pt having difficulty closing/opening left hand however able to manipulate sock doff and don right sock with use of left hand and manage underwear down and up with use of left hand   Transfers   Sit to Stand   (steadying assistance)   Additional items Increased time required   Stand to Sit   (steadying assistance)   Additional items Increased time required;Verbal cues   Stand pivot 4  Minimal assistance  (to steadying assistance)   Additional items Assist x 1;Increased time " "required;Verbal cues   Toilet transfer   (SBA)   Additional items Increased time required;Verbal cues;Standard toilet   Additional Comments no AD. sit<>stand with steadying assistance with inc time with wide Michi. pt guarded. spt without AD with minAx1. manual cues for balance, cues for turning compeltely. transfers to/from toilet with SBA. standing dynamic balance without UE support wiht wide MICHI for pericare and clothing management down and up with steadying asisstance. standing reaching anteirorly for hand hygiene with SBA. spt without AD progressing to steadying assistance.   Ambulation/Elevation   Gait pattern Narrow MICHI;Decreased foot clearance;Scissoring;Short stride;Excessively slow;Step through pattern   Gait Assistance 4  Minimal assist   Additional items Assist x 1;Verbal cues   Assistive Device None   Distance ambulated 40'x1 and 15'x1 without AD with minimal assistance with varied MICHI, scissoring at times and decreased right foot clearance with poor timing of right ankle DF on swing through. cues for inc MICHI and right foot clearance. min manual cues for balance and safe completion initially progressing to steadyign assistance at best 2nd trial.   Balance   Static Sitting Normal   Dynamic Sitting Good   Static Standing Fair   Dynamic Standing Fair -   Ambulatory Poor +  (without AD)   Endurance Deficit   Endurance Deficit   (HR 80s to 90s at rest and icreased as high as mid 120s with activity.)   Activity Tolerance   Activity Tolerance Patient tolerated treatment well   Nurse Made Aware Mery PRINCE   Assessment   Prognosis Good   Problem List Decreased strength;Decreased endurance;Impaired balance;Decreased mobility;Decreased coordination;Decreased safety awareness   Barriers to Discharge Comments pt lives alone, but has support and assistance from family prn   Goals   Patient Goals \"Go home\"   STG Expiration Date 05/25/24   PT Treatment Day 1   Plan   Treatment/Interventions ADL retraining;Functional " "transfer training;LE strengthening/ROM;Elevations;Therapeutic exercise;Endurance training;Patient/family training;Equipment eval/education;Bed mobility;Gait training;Compensatory technique education;Spoke to nursing;Spoke to case management;OT   PT Frequency 2-3x/wk   Discharge Recommendation   Rehab Resource Intensity Level, PT III (Minimum Resource Intensity)  (handout provided for OPPT services at [facility]. pt aware of freedom of choice. reports her sister can drive her there prn)   Equipment Recommended Walker   Walker Package Recommended Rollator   Change/add to Walker Package? Yes, Change Size   Walker Size Stefan (Ht <5'1\")   Additional Comments increased support and assistance from family prn   AM-PAC Basic Mobility Inpatient   Turning in Flat Bed Without Bedrails 4   Lying on Back to Sitting on Edge of Flat Bed Without Bedrails 4   Moving Bed to Chair 3   Standing Up From Chair Using Arms 3   Walk in Room 3   Climb 3-5 Stairs With Railing 3   Basic Mobility Inpatient Raw Score 20   Basic Mobility Standardized Score 43.99   [facility] Highest Level Of Mobility   -HLM Goal 6: Walk 10 steps or more   -HLM Achieved 8: Walk 250 feet ot more   Additional Treatment Session   Start Time 0928   End Time 0952   Treatment Assessment Pt tolerated session well. She had difficulty sequencing spc for ambulation with cane deferring further use due to balance deficits wiht inc path deviaiton and scissoring ass well as slow arina comapred ot wihtout AD. She progressed ambulation wihtout AD but continues to require steadyign asisstance for balance. She was able to ambulate with RW with decreased assistance with min cues for saftey with turning. She ambulated increased distances with decreased assistance with RW compared ot no AD and spc. She is motivated to improve mobility and return to home. She is limited by decreased balance, coordination, proprioception, strength. She will continue to beenfit from PT services " to maximize LOF.   Equipment Use trialed spc. sit<>stand with SBA with inc time. spt with steadying asisstance. ambulated 50'x1 with spc with steadying assistance rae irvin sequencing despite verbal cues and inc path deviation and scissoring. pt using right hand with cane intiailly then trialed left hand with similar gait deviations. deferred further use. no AD for 60'x1 with steadyign asisstance with dec right foot clearance at times with min cues for inc foot clearance and inc EUSEBIO. then intiiated use of RW. sit<>stand with RW with supervision. spt with RW with close supervision with min cues for turning completely prior to sitting. ambulated 150'x1 and 100'x1 with RW with min cues for right foot clearance at times. imporved EUSEBIO and decreased scissoring noted during straight pathways but cues for decreased scissoring right LE when turing and staying wihtin EUSEBIO of RW when turning. completed 4 steps with L HR with SBA up reciprocal pattern and steadying to SBA down reciprocal pattern with right HR. inc time to descend. discussed use of walker upon return to home. pt reports borrowing friends rollator currently. discussed OPPT services. pt verbalize dunderstanding and agreement. discussed having increased support for IADLs like meals intiially upon return to home. pt reports granddaughter and sister can assist patient. reviewed LE TE with ankle DF/PF-pt demonstrated understanding.   End of Consult   Patient Position at End of Consult Bedside chair;Bed/Chair alarm activated;All needs within reach       (Please find full objective findings from PT assessment regarding body systems outlined above).     Assessment: Pt is a 79 y.o. female seen for PT evaluation s/p admission to [facility] on 5/10/2024 with Acute CVA (cerebrovascular accident) (HCC).  Order placed for PT services.  Upon evaluation: Pt is presenting with impaired functional mobility due to decreased strength, decreased endurance,  impaired balance, impaired coordination, impaired proprioception, gait deviations, decreased safety awareness, and fall risk requiring  steadying to minimal assistance for transfers and minimal assistance for ambulation with out AD . Pt's clinical presentation is currently unpredictable given the functional mobility deficits above, especially weakness, decreased endurance, impaired coordination, impaired balance, impaired proprioception, gait deviations, decreased functional mobility tolerance, and decreased safety awareness, coupled with fall risks as indicated by AM-PAC 6-Clicks: 20/24 as well as hx of falls, impaired balance, polypharmacy, impaired coordination, and decreased safety awareness and combined with medical complications of abnormal H&H with hemoglobin 5.8 upon admission requiring blood transfusion, need for input for mobility technique/safety, and Multifocal acute/subacute infarcts involving bilateral frontoparietal and occipital lobes as well as left cerebellum suggesting embolic etiology. There is focal petechial hemorrhage within the left parietal lobe infarct.  Pt's PMHx and comorbidities that may affect physical performance and progress include:  h/o bilateral cataracts, Old right thalamic lacunar infarct on brain MRI . Personal factors affecting pt at time of IE include: step(s) to enter environment, limited home support, inability to perform IADLs, inability to perform ADLs, inability to navigate level surfaces without external assistance, and recent fall(s)/fall history. Pt will benefit from continued skilled PT services to address deficits as defined above and to maximize level of functional mobility to facilitate return toward PLOF and improved QOL. From PT/mobility standpoint, recommendation at time of d/c would be Level III (Minimum Resource Intensity), with family and/or caregiver support, and with walker in order to reduce fall risk and maximize pt's functional independence and  consistency with mobility. Recommend trial with walker next 1-2 sessions, trial with cane next 1-2 sessions, and ther ex next 1-2 sessions.       The patient's AM-PAC Basic Mobility Inpatient Short Form Raw Score is 20. A Raw score of greater than 16 suggests the patient may benefit from discharge to home. Please also refer to the recommendation of the Physical Therapist for safe discharge planning.       Goals: Pt will: Perform bed mobility tasks with independent to reposition in bed and prepare for transfers. Pt will perform transfers with modified Independent to decrease burden of care, decrease risk for falls, and improve activity tolerance and prepare for ambulation. Pt will ambulate with LRAD for >/= 250' with  modified Independent  to decrease burden of care, decrease risk for falls, improve activity tolerance, and improve gait quality and to access home environment. Pt will complete 1 step with LRAD and >/= 12 steps with unilateral handrail with modified Independent to decrease burden of care, return to home with OMAYRA, decrease risk for falls, and improve activity tolerance. Pt will participate in objective balance assessment to determine baseline fall risk. Pt will participate in SSWS assessment to determine level of mobility. Pt will increase B LE strength >/= 1/2 MMT grade to facilitate functional mobility.      Candace Gooden, PT,DPT

## 2024-05-11 NOTE — SPEECH THERAPY NOTE
"Speech Language/Pathology  Speech-Language Pathology Bedside Swallow Evaluation      Patient Name: Agatha Villeda    Today's Date: 5/11/2024     Problem List  Principal Problem:    Acute CVA (cerebrovascular accident) (HCC)  Active Problems:    Acute anemia      Past Medical History  Past Medical History:   Diagnosis Date    Cataracts, bilateral        Past Surgical History  Past Surgical History:   Procedure Laterality Date    CATARACT EXTRACTION Bilateral        Summary   Pt is a 78 yo female w/a diagnosis of CVA. SLP received consult for speech/language assessment. Pt demonstrated functional cognitive as well as expressive and receptive language abilities. Pt able to converse w/SLP appropriately. No cognitive or language deficits noted. No services necessary for speech/language interventions. NSG noted pt w/difficulty w/breakfast meal \"Patient eating [place] toast at breakfast. Felt like bite of [place] toast got stuck, patient began to gag, patient able to pass piece of [place] toast without further concerns.\".  SLP present for lunchtime meal w/dental soft solids and regular solids. Pt stated at home foods sometimes felt \"stuck\". Pt also revealed difficulty w/pill intake w/globus sensation present. No esophageal hx was noted by pt or in medical record. Pt reporting difficulty mainly w/breads and dry meats. Oral and pharyngeal abilities deemed WFL for regular solids during the evaluation. Pt utilizing compensatory strategies appropriately to aid in bolus motility (small bites, slow rate, cyclical ingestion). No s/s of globus sensation present during eval. Recommend continue regular textures and thin liquids as well as GI consult d/t possible esophageal concerns. Recommend f/u from SLP to ensure continued diet tolerance.     Risk/s for Aspiration: Low     Recommended Diet: regular diet and thin liquids   Recommended Form of Meds: crushed with puree   Aspiration precautions and swallowing strategies: upright " "posture, slow rate of feeding, small bites/sips, and alternating bites and sips  Other Recommendations: Continue frequent oral care    Current Medical Status  Per epic review 5/10/24 \"Patient is a 79-year-old female who has had no follow-up with primary care provider/physician in the past who is presenting to the emergency room with family reporting that patient suffered from right-sided weakness since Monday while on a cruise. Daughter who is bedside provides most the history reports that the patient had some upper extremity stiffness bilaterally on Sunday evening. She woke on Monday and had some nausea and vomiting and then was able to go to breakfast but had difficulty cutting her food and when she stood up she fell over to with the daughter reports as \"the right.\" Since that time she was in a wheelchair. Returning home she has been needing to use a walker because she reports that her right lower extremity is \"dragging.\" This is not normal for the patient who is normally independent without a walker.\"    Current Precautions:  Fall  Aspiration     Allergies:  No known food allergies    Past medical history:  Please see H&P for details    Special Studies:  5/10/24 MRI  \"1.  Multifocal acute/subacute infarcts involving bilateral frontoparietal and occipital lobes as well as left cerebellum suggesting embolic etiology. There is focal petechial hemorrhage within the left parietal lobe infarct. No mass effect.  2.  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 Chronic microangiopathic change.  3.  Right mastoid effusion. Correlate for mastoiditis.\"    CT 5/10/14  \"-Hypodensities within the left parietal lobe and left cerebellar hemisphere may represent age-indeterminate infarct. Recommend MRI of the brain for further evaluation.  -Chronic microangiopathic changes\"    Social/Education/Vocational Hx:  Pt lives alone w/help from family     Swallow Information   Current Risks for Dysphagia & Aspiration: CVA  Current " Symptoms/Concerns: Globus sensation, possible esophageal concerns  Current Diet: regular diet and thin liquids   Baseline Diet: regular diet and thin liquids      Baseline Assessment   Behavior/Cognition: alert  Speech/Language Status: able to participate in conversation and able to follow commands  Patient Positioning: upright in chair  Pain Status/Interventions/Response to Interventions:  No report of or nonverbal indications of pain.       Swallow Mechanism Exam  Facial: symmetrical  Labial: WFL  Lingual: WFL  Velum: DNT  Mandible: adequate ROM  Dentition: adequate, missing teeth  Vocal quality:clear/adequate   Volitional Cough: strong/productive   Respiratory Status: on RA      Consistencies Assessed and Performance   Consistencies Administered: thin liquids, soft solids, and hard solids      Oral Stage: WFL  Mastication was adequate with the materials administered today.  Bolus formation and transfer were functional with no significant oral residue noted.  No overt s/s reduced oral control.    Pharyngeal Stage: WFL  Swallow Mechanics:  Swallowing initiation appeared prompt.  Laryngeal rise was palpated and judged to be within functional limits.  No coughing, throat clearing, change in vocal quality or respiratory status noted today.     Esophageal Concerns: globus sensation, recommending GI consult d/t ongoing s/s    Strategies and Efficacy: small bite sizes, slow rate of intake, cyclical ingestion    Summary and Recommendations (see above)    Results Reviewed with: patient and MD     Treatment Recommended: Yes     Frequency of treatment: 1-3x week, to ensure continued tolerance and use of strategies to improve bolus motility     Patient Stated Goal:    Dysphagia LTG  -Patient will demonstrate safe and effective oral intake for the highest appropriate diet level.       Re: Compensatory Strategies    -Patient will utilize trained compensatory strategies (eg.  controlled bite/sip size, controlled rate, ”prep  set”, neck flexion, multiple swallows, alternation of food with liquid,head turn etc.) with 80% accuracy to improve bolus motility with the least restrictive food/liquid consistencies    -Patient will demonstrate independent use of recommended safe swallowing strategies during a clinician assessed meal across 1-3 diagnostic sessions.        Speech Therapy Prognosis   Prognosis: good    Prognosis Considerations: medical status    Carter Liz MS CCC-SLP

## 2024-05-11 NOTE — QUICK NOTE
Speech therapist Carter Palafox made aware patient having difficulty with swallowing food and pills with the feeling of it getting stuck or actually is stuck while swallowing. Patient had no difficulty taking the aspirin whole by mouth this morning, but did have the feeling of the plavix being stuck and then finally passed it down after time, water, and gagging. Recommended to patient to wait for speech evaluation for determination but applesauce could be used in the future also with administration with meds.

## 2024-05-11 NOTE — ASSESSMENT & PLAN NOTE
Noted to have hgb 5.8  No overt bleeding  No previous hgb so may not be acute considering low mcv  Received 2 units packed red blood cells with appropriate compensation  Hemoglobin now 9  No overt bleeding  Noted to have severe iron deficiency anemia  No previous colonoscopy/endoscopy  Will consult GI for hopeful colonoscopy/endoscopy on Monday  Monitor kidney function  Will provide IV Venofer in the meantime

## 2024-05-11 NOTE — ASSESSMENT & PLAN NOTE
"Presented with R leg weakness that began 3 days ago that has not improved  Possibly 2/2 to acute stroke  Ct scan revealed  \"Hypodensities within the left parietal lobe and left cerebellar hemisphere may represent age-indeterminate infarct\"  MRI revealed \" Multifocal acute/subacute infarcts involving bilateral frontoparietal and occipital lobes as well as left cerebellum suggesting embolic etiology\"  Neurology consulted  Will continue with aspirin Plavix, statin  Will need event monitor outpatient will provide cardiology referral  PT/OT  "

## 2024-05-11 NOTE — PLAN OF CARE
Problem: PHYSICAL THERAPY ADULT  Goal: Performs mobility at highest level of function for planned discharge setting.  See evaluation for individualized goals.  Description: Treatment/Interventions: ADL retraining, Functional transfer training, LE strengthening/ROM, Elevations, Therapeutic exercise, Endurance training, Patient/family training, Equipment eval/education, Bed mobility, Gait training, Compensatory technique education, Spoke to nursing, Spoke to case management, OT  Equipment Recommended: Walker       See flowsheet documentation for full assessment, interventions and recommendations.  5/11/2024 1240 by Candace Gooden, PT  Note: Prognosis: Good  Problem List: Decreased strength, Decreased endurance, Impaired balance, Decreased mobility, Decreased coordination, Decreased safety awareness       Pt tolerated session well. She had difficulty sequencing spc for ambulation with cane deferring further use due to balance deficits wiht inc path deviaiton and scissoring ass well as slow arina comapred ot wihtout AD. She progressed ambulation wihtout AD but continues to require steadyign asisstance for balance. She was able to ambulate with RW with decreased assistance with min cues for saftey with turning. She ambulated increased distances with decreased assistance with RW compared ot no AD and spc. She is motivated to improve mobility and return to home. She is limited by decreased balance, coordination, proprioception, strength. She will continue to beenfit from PT services to maximize LOF.     Barriers to Discharge Comments: pt lives alone, but has support and assistance from family prn  Rehab Resource Intensity Level, PT: III (Minimum Resource Intensity) (handout provided for OPPT services at [facility]. pt aware of freedom of choice. reports her sister can drive her there prn)    See flowsheet documentation for full assessment.

## 2024-05-11 NOTE — CONSULTS
TeleConsultation - Neurology   Agatha Villeda 79 y.o. female MRN: [id number]   Encounter: [id number]      REQUIRED DOCUMENTATION:     1. This service was provided via Telemedicine.  2. Provider located in [place].  3. TeleMed provider: Lamar Núñez MD.  4. Identify all parties in room with patient during tele consult:  None  5. After connecting through televideo, patient was identified by name and date of birth and assistant checked wristband.  Patient was then informed that this was a Telemedicine visit and that the exam was being conducted confidentially over secure lines. My office door was closed. No one else was in the room.  Patient acknowledged consent and understanding of privacy and security of the Telemedicine visit, and gave us permission to have the assistant stay in the room in order to assist with the history and to conduct the exam.  I informed the patient that I have reviewed their record in Epic and presented the opportunity for them to ask any questions regarding the visit today.  The patient agreed to participate.       Assessment/Plan   Assessment:  Right leg weakness, acute:  MRI brain reveals multi-bi hemispheric infarcts  CTA shows multiple intracranial and extra-cranial stenoses, notably, left carotid artery appears to have about 50% stenosis at origin.  Etiology is cryptogenic at this time, but high likelihood of being cardio-embolic    TTE: technically sufficient study. Mild dilatation of left atrium  LDL 88  HgbA1C pending  BP ranging from 150/70 to 180/90    Of note, plt count >600,000    Plan:  Aspirin 81 mg, and Plavix 75 mg daily  Lipitor 40 mg daily  Loop recorder placement in outpatient  Risk factor optimization including antihypertensive treatment, goal SBP<140 mmHg  PT/OT evaluation    PCP to follow-up on platelet count     Follow in stroke clinic in 1 month to follow on loop recorder placement, and to monitor for carotid disease.       History of Present Illness     Reason for  Consult / Principal Problem: weakness  Hx and PE limited by: none  HPI: Agatha Villeda is a 79 y.o. female who presents with weakness in right leg. She was on a cruise on Monday when symptoms started. She felt right leg weak, and was dragging. She also felt numbness of both hands, along with stiffness in left elbow.  Symptoms have gradually improved.  She never had this before. She takes baby aspirin on daily basis.    Inpatient consult to Neurology  Consult performed by: Lamar Núñez MD  Consult ordered by: David Dye MD          Review of Systems  Complete ROS was done and is negative other than what is mentioned in HPI    Historical Information   Past Medical History:   Diagnosis Date    Cataracts, bilateral      Past Surgical History:   Procedure Laterality Date    CATARACT EXTRACTION Bilateral      Social History   Social History     Substance and Sexual Activity   Alcohol Use Never     Social History     Substance and Sexual Activity   Drug Use Never     Social History     Tobacco Use   Smoking Status Never   Smokeless Tobacco Never     Family History: No family history on file.    Review of previous medical records was completed.     Meds/Allergies   PTA meds:   None       No Known Allergies    Objective   Vitals:Blood pressure 168/85, pulse 89, temperature (!) 97.3 °F (36.3 °C), resp. rate 17, height 5' (1.524 m), weight 56.2 kg (123 lb 14.4 oz), SpO2 95%.,Body mass index is 24.2 kg/m².    Intake/Output Summary (Last 24 hours) at 5/11/2024 0747  Last data filed at 5/10/2024 1900  Gross per 24 hour   Intake 820 ml   Output --   Net 820 ml       Invasive Devices:   Invasive Devices       Peripheral Intravenous Line  Duration             Peripheral IV 05/10/24 Right Antecubital <1 day                    Physical Exam NAD  Skin: no seen lesions  HEENT: ATNC  Chest: breathing comfortably on room air  GI: no apparent distension.    Neurologic Exam  Alert and oriented for self, place and time. Memory is  "intact to recent and remote events. Language is intact to comprehension and expression. No neglect. Speech is normal. EOMI. Pupils are symmetric. Visual field appears intact. Face is symmetric. Tongue is midline. Able to move all extremities against gravity, with drift of right leg. No dysmetria noted.      NIHSS 1 for right leg drift    Lab Results: CBC:   Results from last 7 days   Lab Units 05/11/24  0436 05/10/24  1053 05/10/24  0959   WBC Thousand/uL 9.03  --  9.65   RBC Million/uL 4.26  --  3.51*   HEMOGLOBIN g/dL 9.2* 5.8* 6.7*   HEMATOCRIT % 31.3* 20.8* 24.5*   MCV fL 74*  --  70*   PLATELETS Thousands/uL 614*  --  779*   , BMP/CMP:   Results from last 7 days   Lab Units 05/11/24  0436 05/10/24  0959   SODIUM mmol/L 137 138   POTASSIUM mmol/L 3.7 3.8   CHLORIDE mmol/L 105 104   CO2 mmol/L 24 24   BUN mg/dL 9 12   CREATININE mg/dL 0.80 0.93   CALCIUM mg/dL 9.1 9.4   AST U/L  --  16   ALT U/L  --  11   ALK PHOS U/L  --  68   EGFR ml/min/1.73sq m 70 58   , Vitamin B12:   Results from last 7 days   Lab Units 05/10/24  1534   VITAMIN B 12 pg/mL 878   , HgBA1C:   , TSH:   , Coagulation:   , Lipid Profile:   Results from last 7 days   Lab Units 05/11/24  0439   HDL mg/dL 49*   LDL CALC mg/dL 88   TRIGLYCERIDES mg/dL 87   , Ammonia:   , Urinalysis:       Invalid input(s): \"URIBILINOGEN\", Drug Screen:     Imaging Studies: I have personally reviewed pertinent films in PACS  EKG, Pathology, and Other Studies: I have personally reviewed pertinent reports.    VTE Prophylaxis: Sequential compression device (Venodyne)     Code Status: Level 1 - Full Code  Advance Directive and Living Will:      Power of :    POLST:      Counseling / Coordination of Care  N/A    "

## 2024-05-11 NOTE — QUICK NOTE
Patient eating English toast at breakfast. Felt like bite of English toast got stuck, patient began to gag, patient able to pass piece of English toast without further concerns. Educated on eating smaller softer pieces with no talking during eating. Patient in upright position during meal. Patient placed on aspiration precautions and downgraded to dental soft. Patient stated she does have missing teeth and at times at home prior food did get stuck. Dr. Dye made aware.

## 2024-05-11 NOTE — CASE MANAGEMENT
Case Management Assessment & Discharge Planning Note    Patient name Agatha BUSTAMANTE Villeda  Location /-01 MRN [id number]  : 1945 Date 2024       Current Admission Date: 5/10/2024  Current Admission Diagnosis:Right leg weakness   Patient Active Problem List    Diagnosis Date Noted    Right leg weakness 05/10/2024    Acute anemia 05/10/2024      LOS (days): 1  Geometric Mean LOS (GMLOS) (days): 1.9  Days to GMLOS:1     OBJECTIVE:    Risk of Unplanned Readmission Score: 8.18         Current admission status: Inpatient  Referral Reason: Stroke    Preferred Pharmacy:   [id number] - [place], [place] - [address]  [address] [address]  Phone: [phone] Fax: [phone]    Primary Care Provider: Nasrin Garcia MD    Primary Insurance: MEDICARE  Secondary Insurance: BLUE CROSS    ASSESSMENT:  Active Health Care Proxies    There are no active Health Care Proxies on file.       Advance Directives  Does patient have a Health Care POA?: No  Was patient offered paperwork?: Yes (Declined)  Does patient currently have a Health Care decision maker?: Yes, please see Health Care Proxy section  Does patient have Advance Directives?: No  Was patient offered paperwork?: Yes (Declined)  Primary Contact: Birgit (Grandchild)         Readmission Root Cause  30 Day Readmission: No    Patient Information  Admitted from:: Home  Mental Status: Alert  During Assessment patient was accompanied by: Not accompanied during assessment  Assessment information provided by:: Patient  Primary Caregiver: Self  Support Systems: Family members  [place] Residence: [facility]  [place] [facility] do you live in?: [place]  Home entry access options. Select all that apply.: Stairs  Number of steps to enter home.: 5  Do the steps have railings?: Yes  Type of Current Residence: Trailer Home  Living Arrangements: Lives Alone  Is patient a ?: No    Activities of Daily Living Prior to  Admission  Functional Status: Independent  Completes ADLs independently?: Yes  Ambulates independently?: Yes  Does patient use assisted devices?: Yes  Assisted Devices (DME) used: Walker, Other (Comment) (build in shower seat in walk in shower)  Does patient currently own DME?: No (walker they use is borrowed)  Does patient have a history of Outpatient Therapy (PT/OT)?: No  Does the patient have a history of Short-Term Rehab?: No  Does patient have a history of HHC?: No  Does patient currently have HHC?: No         Patient Information Continued  Income Source: SSI/SSD (and pension)  Does patient have prescription coverage?: Yes  Does patient receive dialysis treatments?: No  Does patient have a history of substance abuse?: No  Does patient have a history of Mental Health Diagnosis?: No         Means of Transportation  Means of Transport to \[facility]\"":: Family transport      Social Determinants of Health (SDOH)      Flowsheet Row Most Recent Value   Housing Stability    In the last 12 months, was there a time when you were not able to pay the mortgage or rent on time? N   In the last 12 months, how many places have you lived? 1   In the last 12 months, was there a time when you did not have a steady place to sleep or slept in a shelter (including now)? N   Transportation Needs    In the past 12 months, has lack of transportation kept you from medical appointments or from getting medications? no   In the past 12 months, has lack of transportation kept you from meetings, work, or from getting things needed for daily living? No   Food Insecurity    Within the past 12 months, you worried that your food would run out before you got the money to buy more. Never true   Within the past 12 months, the food you bought just didn't last and you didn't have money to get more. Never true   Utilities    In the past 12 months has the electric, gas, oil, or water company threatened to shut off services in your home? No             DISCHARGE DETAILS:    Discharge planning discussed with:: Patient  Freedom of Choice: Yes  Comments - [place] of Choice: C is preference with blanket referral - sister will live with her  CM contacted family/caregiver?: Yes (LM for grandpaulette Genao at patient's request)  Were Treatment Team discharge recommendations reviewed with patient/caregiver?: Yes  Did patient/caregiver verbalize understanding of patient care needs?: Yes  Were patient/caregiver advised of the risks associated with not following Treatment Team discharge recommendations?: Yes    Contacts  Patient Contacts: Birgit (Grandchild)  Relationship to Patient:: Family  Contact Method: Phone  Phone Number: [phone]  Reason/Outcome: Continuity of Care, Discharge Planning         Would you like to participate in our [facility] service program?  : No - Declined         CM met with patient at the bedside,baseline information  was obtained. CM discussed the role of CM in helping the patient develop a discharge plan and assist the patient in carry out their plan.  Patient lives alone at home in a trailer home with 5 steps to enter. Patient reports to be IPTA and was borrowing a walker from someone to assist with her mobility.   CM and patient discussed discharge planning pending therapy evaluations. Patient's preference is [facility] with a blanket referral. If needed, her sister is able to move in with her while she recovers as she understands she needs assistance with her mobility.     1159am Cm received call back from grandchild Birgit - and reviewed OP therapy recommendation.     CM to follow for patient's care and discharge needs.

## 2024-05-11 NOTE — PLAN OF CARE
Problem: PHYSICAL THERAPY ADULT  Goal: Performs mobility at highest level of function for planned discharge setting.  See evaluation for individualized goals.  Description: Treatment/Interventions: ADL retraining, Functional transfer training, LE strengthening/ROM, Elevations, Therapeutic exercise, Endurance training, Patient/family training, Equipment eval/education, Bed mobility, Gait training, Compensatory technique education, Spoke to nursing, Spoke to case management, OT  Equipment Recommended: Walker       See flowsheet documentation for full assessment, interventions and recommendations.  Note: Prognosis: Good  Problem List: Decreased strength, Decreased endurance, Impaired balance, Decreased mobility, Decreased coordination, Decreased safety awareness  Assessment: Pt is a 79 y.o. female seen for PT evaluation s/p admission to [facility] on 5/10/2024 with Acute CVA (cerebrovascular accident) (HCC).  Order placed for PT services.  Upon evaluation: Pt is presenting with impaired functional mobility due to decreased strength, decreased endurance, impaired balance, impaired coordination, impaired proprioception, gait deviations, decreased safety awareness, and fall risk requiring  steadying to minimal assistance for transfers and minimal assistance for ambulation with out AD . Pt's clinical presentation is currently unpredictable given the functional mobility deficits above, especially weakness, decreased endurance, impaired coordination, impaired balance, impaired proprioception, gait deviations, decreased functional mobility tolerance, and decreased safety awareness, coupled with fall risks as indicated by AM-PAC 6-Clicks: 20/24 as well as hx of falls, impaired balance, polypharmacy, impaired coordination, and decreased safety awareness and combined with medical complications of abnormal H&H with hemoglobin 5.8 upon admission requiring blood transfusion, need for input for mobility  technique/safety, and Multifocal acute/subacute infarcts involving bilateral frontoparietal and occipital lobes as well as left cerebellum suggesting embolic etiology. There is focal petechial hemorrhage within the left parietal lobe infarct.  Pt's PMHx and comorbidities that may affect physical performance and progress include:  h/o bilateral cataracts Old right thalamic lacunar infarct on brain MRI. Personal factors affecting pt at time of IE include: step(s) to enter environment, limited home support, inability to perform IADLs, inability to perform ADLs, inability to navigate level surfaces without external assistance, and recent fall(s)/fall history. Pt will benefit from continued skilled PT services to address deficits as defined above and to maximize level of functional mobility to facilitate return toward PLOF and improved QOL. From PT/mobility standpoint, recommendation at time of d/c would be Level III (Minimum Resource Intensity), with family and/or caregiver support, and with walker in order to reduce fall risk and maximize pt's functional independence and consistency with mobility. Recommend trial with walker next 1-2 sessions, trial with cane next 1-2 sessions, and ther ex next 1-2 sessions.     Barriers to Discharge Comments: pt lives alone, but has support and assistance from family prn  Rehab Resource Intensity Level, PT: III (Minimum Resource Intensity) (handout provided for OPPT services at [facility]. pt aware of freedom of choice. reports her sister can drive her there prn)    See flowsheet documentation for full assessment.

## 2024-05-11 NOTE — PLAN OF CARE
Problem: Nutrition/Hydration-ADULT  Goal: Nutrient/Hydration intake appropriate for improving, restoring or maintaining nutritional needs  Description: Monitor and assess patient's nutrition/hydration status for malnutrition. Collaborate with interdisciplinary team and initiate plan and interventions as ordered.  Monitor patient's weight and dietary intake as ordered or per policy. Utilize nutrition screening tool and intervene as necessary. Determine patient's food preferences and provide high-protein, high-caloric foods as appropriate.     INTERVENTIONS:  - Monitor oral intake, urinary output, labs, and treatment plans  - Assess nutrition and hydration status and recommend course of action  - Evaluate amount of meals eaten  - Assist patient with eating if necessary   - Allow adequate time for meals  - Recommend/ encourage appropriate diets, oral nutritional supplements, and vitamin/mineral supplements  - Order, calculate, and assess calorie counts as needed  - Recommend, monitor, and adjust tube feedings and TPN/PPN based on assessed needs  - Assess need for intravenous fluids  - Provide specific nutrition/hydration education as appropriate  - Include patient/family/caregiver in decisions related to nutrition  Outcome: Progressing     Problem: HEMATOLOGIC - ADULT  Goal: Maintains hematologic stability  Description: INTERVENTIONS  - Assess for signs and symptoms of bleeding or hemorrhage  - Monitor labs  - Administer supportive blood products/factors as ordered and appropriate  Outcome: Progressing

## 2024-05-11 NOTE — PLAN OF CARE
Problem: INFECTION - ADULT  Goal: Absence or prevention of progression during hospitalization  Description: INTERVENTIONS:  - Assess and monitor for signs and symptoms of infection  - Monitor lab/diagnostic results  - Monitor all insertion sites, i.e. indwelling lines, tubes, and drains  - Monitor endotracheal if appropriate and nasal secretions for changes in amount and color  - [place] appropriate cooling/warming therapies per order  - Administer medications as ordered  - Instruct and encourage patient and family to use good hand hygiene technique  - Identify and instruct in appropriate isolation precautions for identified infection/condition  Outcome: Progressing     Problem: PAIN - ADULT  Goal: Verbalizes/displays adequate comfort level or baseline comfort level  Description: Interventions:  - Encourage patient to monitor pain and request assistance  - Assess pain using appropriate pain scale  - Administer analgesics based on type and severity of pain and evaluate response  - Implement non-pharmacological measures as appropriate and evaluate response  - Consider cultural and social influences on pain and pain management  - Notify physician/advanced practitioner if interventions unsuccessful or patient reports new pain  Outcome: Progressing     Problem: Knowledge Deficit  Goal: Patient/family/caregiver demonstrates understanding of disease process, treatment plan, medications, and discharge instructions  Description: Complete learning assessment and assess knowledge base.  Interventions:  - Provide teaching at level of understanding  - Provide teaching via preferred learning methods  Outcome: Progressing     Problem: DISCHARGE PLANNING  Goal: Discharge to home or other facility with appropriate resources  Description: INTERVENTIONS:  - Identify barriers to discharge w/patient and caregiver  - Arrange for needed discharge resources and transportation as appropriate  - Identify discharge learning needs (meds,  last office visit in 9/2016. patient needs office visit before any additional refills.     PRESCRIPTION not sent. Please contact pt to schedule follow-up appointment.    wound care, etc.)  - Arrange for interpretive services to assist at discharge as needed  - Refer to Case Management Department for coordinating discharge planning if the patient needs post-hospital services based on physician/advanced practitioner order or complex needs related to functional status, cognitive ability, or social support system  Outcome: Progressing

## 2024-05-11 NOTE — PROGRESS NOTES
"[facility]  Progress Note  Name: Agatha Villeda I  MRN: [id number]  Unit/Bed#: -01 I Date of Admission: 5/10/2024   Date of Service: 5/11/2024 I Hospital Day: 1    Assessment/Plan   * Acute CVA (cerebrovascular accident) (HCC)  Assessment & Plan  Presented with R leg weakness that began 3 days ago that has not improved  Possibly 2/2 to acute stroke  Ct scan revealed  \"Hypodensities within the left parietal lobe and left cerebellar hemisphere may represent age-indeterminate infarct\"  MRI revealed \" Multifocal acute/subacute infarcts involving bilateral frontoparietal and occipital lobes as well as left cerebellum suggesting embolic etiology\"  Neurology consulted  Will continue with aspirin Plavix, statin  Will need event monitor outpatient will provide cardiology referral  PT/OT    Acute anemia  Assessment & Plan  Noted to have hgb 5.8  No overt bleeding  No previous hgb so may not be acute considering low mcv  Received 2 units packed red blood cells with appropriate compensation  Hemoglobin now 9  No overt bleeding  Noted to have severe iron deficiency anemia  No previous colonoscopy/endoscopy  Will consult GI for hopeful colonoscopy/endoscopy on Monday  Monitor kidney function  Will provide IV Venofer in the meantime         VTE Pharmacologic Prophylaxis:   Pharmacologic: Heparin  Mechanical VTE Prophylaxis in Place: Yes    Patient Centered Rounds: I have performed bedside rounds with nursing staff today.    Discussions with Specialists or Other Care Team Provider: cm, nursing    Education and Discussions with Family / Patient: pt    Time Spent for Care: 30 minutes.  More than 50% of total time spent on counseling and coordination of care as described above.    Current Length of Stay: 1 day(s)    Current Patient Status: Inpatient   Certification Statement: The patient will continue to require additional inpatient hospital stay due to see below    Discharge Plan: Pending " further evaluation of stroke and acute anemia.  Dissipate approximately 48 hours    Code Status: Level 1 - Full Code      Subjective:   Denies chest pain, shortness of breath, cough, fevers, chills    Objective:     Vitals:   Temp (24hrs), Av.6 °F (36.4 °C), Min:96.8 °F (36 °C), Max:98.9 °F (37.2 °C)    Temp:  [96.8 °F (36 °C)-98.9 °F (37.2 °C)] 97.3 °F (36.3 °C)  HR:  [] 89  Resp:  [15-20] 17  BP: (122-181)/(56-97) 168/85  SpO2:  [92 %-98 %] 95 %  Body mass index is 24.2 kg/m².     Input and Output Summary (last 24 hours):       Intake/Output Summary (Last 24 hours) at 2024 0941  Last data filed at 2024 0845  Gross per 24 hour   Intake 940 ml   Output --   Net 940 ml       Physical Exam:     Physical Exam  Constitutional:       General: She is not in acute distress.     Appearance: She is well-developed. She is not diaphoretic.   HENT:      Head: Normocephalic and atraumatic.      Nose: Nose normal.      Mouth/Throat:      Pharynx: No oropharyngeal exudate.   Eyes:      General: No scleral icterus.        Right eye: No discharge.         Left eye: No discharge.      Conjunctiva/sclera: Conjunctivae normal.   Neck:      Thyroid: No thyromegaly.      Vascular: No JVD.   Cardiovascular:      Rate and Rhythm: Normal rate and regular rhythm.      Heart sounds: Normal heart sounds. No murmur heard.     No friction rub. No gallop.   Pulmonary:      Effort: Pulmonary effort is normal. No respiratory distress.      Breath sounds: Normal breath sounds. No wheezing or rales.   Chest:      Chest wall: No tenderness.   Abdominal:      General: Bowel sounds are normal. There is no distension.      Palpations: Abdomen is soft.      Tenderness: There is no abdominal tenderness. There is no guarding or rebound.   Musculoskeletal:         General: No tenderness or deformity. Normal range of motion.      Cervical back: Normal range of motion and neck supple.   Skin:     General: Skin is warm and dry.       Findings: No erythema or rash.   Neurological:      Mental Status: She is alert. Mental status is at baseline.      Cranial Nerves: No cranial nerve deficit.      Sensory: No sensory deficit.      Motor: No abnormal muscle tone.      Coordination: Coordination normal.       (  Additional Data:     Labs:    Results from last 7 days   Lab Units 05/11/24  0436   WBC Thousand/uL 9.03   HEMOGLOBIN g/dL 9.2*   HEMATOCRIT % 31.3*   PLATELETS Thousands/uL 614*   SEGS PCT % 76*   LYMPHO PCT % 13*   MONO PCT % 7   EOS PCT % 3     Results from last 7 days   Lab Units 05/11/24  0436 05/10/24  0959   SODIUM mmol/L 137 138   POTASSIUM mmol/L 3.7 3.8   CHLORIDE mmol/L 105 104   CO2 mmol/L 24 24   BUN mg/dL 9 12   CREATININE mg/dL 0.80 0.93   ANION GAP mmol/L 8 10   CALCIUM mg/dL 9.1 9.4   ALBUMIN g/dL  --  4.0   TOTAL BILIRUBIN mg/dL  --  0.29   ALK PHOS U/L  --  68   ALT U/L  --  11   AST U/L  --  16   GLUCOSE RANDOM mg/dL 96 126                           * I Have Reviewed All Lab Data Listed Above.  * Additional Pertinent Lab Tests Reviewed: All Labs Within Last 24 Hours Reviewed    Imaging:    Imaging Reports Reviewed Today Include: na  Imaging Personally Reviewed by Myself Includes:  na    Recent Cultures (last 7 days):           Last 24 Hours Medication List:   Current Facility-Administered Medications   Medication Dose Route Frequency Provider Last Rate    acetaminophen  650 mg Oral Q4H PRN David Dye MD      aspirin  81 mg Oral Daily David Dye MD      atorvastatin  40 mg Oral QPM David Dye MD      clopidogrel  75 mg Oral Daily David Dye MD      iron sucrose  200 mg Intravenous Daily David Dye MD          Today, Patient Was Seen By: David Dye MD    ** Please Note: Dictation voice to text software may have been used in the creation of this document. **

## 2024-05-12 PROCEDURE — 97166 OT EVAL MOD COMPLEX 45 MIN: CPT

## 2024-05-12 PROCEDURE — 99232 SBSQ HOSP IP/OBS MODERATE 35: CPT | Performed by: INTERNAL MEDICINE

## 2024-05-12 RX ORDER — HEPARIN SODIUM 5000 [USP'U]/ML
5000 INJECTION, SOLUTION INTRAVENOUS; SUBCUTANEOUS EVERY 8 HOURS SCHEDULED
Status: DISCONTINUED | OUTPATIENT
Start: 2024-05-13 | End: 2024-05-17 | Stop reason: HOSPADM

## 2024-05-12 RX ORDER — POLYETHYLENE GLYCOL 3350 17 G/17G
17 POWDER, FOR SOLUTION ORAL DAILY PRN
Status: DISCONTINUED | OUTPATIENT
Start: 2024-05-12 | End: 2024-05-17 | Stop reason: HOSPADM

## 2024-05-12 RX ORDER — AMLODIPINE BESYLATE 5 MG/1
5 TABLET ORAL DAILY
Status: DISCONTINUED | OUTPATIENT
Start: 2024-05-12 | End: 2024-05-17 | Stop reason: HOSPADM

## 2024-05-12 RX ADMIN — ATORVASTATIN CALCIUM 40 MG: 40 TABLET, FILM COATED ORAL at 17:40

## 2024-05-12 RX ADMIN — AMLODIPINE BESYLATE 5 MG: 5 TABLET ORAL at 10:57

## 2024-05-12 RX ADMIN — CLOPIDOGREL 75 MG: 75 TABLET ORAL at 08:38

## 2024-05-12 RX ADMIN — IRON SUCROSE 200 MG: 20 INJECTION, SOLUTION INTRAVENOUS at 08:41

## 2024-05-12 RX ADMIN — POLYETHYLENE GLYCOL 3350 17 G: 17 POWDER, FOR SOLUTION ORAL at 10:51

## 2024-05-12 RX ADMIN — ASPIRIN 81 MG CHEWABLE TABLET 81 MG: 81 TABLET CHEWABLE at 08:38

## 2024-05-12 NOTE — OCCUPATIONAL THERAPY NOTE
"    Occupational Therapy Evaluation     Patient Name: Agatha Villeda  Today's Date: 2024  Problem List  Principal Problem:    Acute CVA (cerebrovascular accident) (HCC)  Active Problems:    Acute anemia    Past Medical History  Past Medical History:   Diagnosis Date    Cataracts, bilateral      Past Surgical History  Past Surgical History:   Procedure Laterality Date    CATARACT EXTRACTION Bilateral         24 4466   Note Type   Note type Evaluation   Pain Assessment   Pain Assessment Tool 0-10   Pain Score No Pain   Restrictions/Precautions   Other Precautions Chair Alarm;Bed Alarm;Fall Risk   Home Living   Type of Home House  (5 [facility])   Home Layout One level;Performs ADLs on one level;Able to live on main level with bedroom/bathroom   Bathroom Shower/Tub Walk-in shower   Bathroom Toilet Raised   Bathroom Equipment Built-in shower seat;Grab bars in shower;Commode   Additional Comments Pt reports living in a 1SH alone. No AD PTA.   Prior Function   Level of [place] Independent with ADLs;Independent with functional mobility;Independent with IADLS   Lives With Alone   Receives Help From Family   IADLs Independent with driving;Independent with meal prep;Independent with medication management   Falls in the last 6 months 1 to 4   General   Family/Caregiver Present Yes  (sister, Millie and nephew)   Additional General Comments MRI brain showin.  Multifocal acute/subacute infarcts involving bilateral frontoparietal and occipital lobes as well as left cerebellum suggesting embolic etiology. There is focal petechial hemorrhage within the left parietal lobe infarct. No mass effect.  2.  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 Chronic microangiopathic change.  3.  Right mastoid effusion. Correlate for mastoiditis.   Subjective   Subjective \"I think I had a stroke\"   ADL   UB Dressing Assistance 5  Supervision/Setup   UB Dressing Deficit Setup;Verbal cueing;Increased time to complete   LB Dressing " Assistance 5  Supervision/Setup   LB Dressing Deficit Setup;Requires assistive device for steadying;Verbal cueing;Increased time to complete   Additional Comments UB ADLs @ S/set-up while seated at EOB. LB ADLs @ S/set-up. Pt able to don socks while seated in chair, clothing management while standing @ S.   Bed Mobility   Additional Comments Pt seated OOB in chair at beginning and end of session. Bed mobility not assessed at this time.   Transfers   Sit to Stand 5  Supervision   Additional items Increased time required;Verbal cues   Stand to Sit 5  Supervision   Additional items Increased time required;Verbal cues   Stand pivot 5  Supervision   Additional items Increased time required;Verbal cues   Additional Comments STS from chair to RW @ S. Pt able to complete short distance functional mobility around room and in hallway with RW @ S. Pt seated OOB in chair at end of sessiom with call bell within reach.   Balance   Static Sitting Normal   Dynamic Sitting Good   Static Standing Fair +   Dynamic Standing Fair   Activity Tolerance   Activity Tolerance Patient tolerated treatment well   Medical Staff Made Aware Spoke with PT Candace   Nurse Made Aware Spoke with SURESH Ordonez   RUSOFIYA Assessment   RUE Assessment WFL   LUE Assessment   LUE Assessment WFL   Hand Function   Gross Motor Coordination Functional   Fine Motor Coordination Functional   Hand Function Comments Slightly decreased RUE shoulder MS compared to RUE but decreased LUE . Pt reports she had difficulty opening and closing her hand yesterday but has no difficulty today.   Cognition   Overall Cognitive Status WFL   Arousal/Participation Alert;Responsive;Cooperative   Attention Within functional limits   Orientation Level Oriented X4   Memory Within functional limits   Following Commands Follows all commands and directions without difficulty   Assessment   Limitation Decreased high-level ADLs;Decreased endurance   Prognosis Good   Assessment Pt is a 79 y.o.  female, admitted to [facility] 5/10/2024 d/t experiencing RLE weakness. Dx: acute CVA. Pt with PMHx impacting their performance during ADL tasks, including: B cataracts. Prior to admission to the hospital Pt was performing ADLs without physical assistance. IADLs without physical assistance. Functional transfers/ambulation without physical assistance. Cognitive status was PTA was Intact. OT order placed to assess Pt's ADLs, cognitive status, and performance during functional tasks in order to maximize safety and independence while making most appropriate d/c recommendations. Pt's clinical presentation is currently unstable/unpredictable given new onset deficits that effect Pt's occupational performance and ability to safely return to PLOF including decrease standing balance, decrease performance during ADL tasks, decrease UB MS, and decrease performance during functional transfers combined with medical complications of abnormal H&H, abnormal CBC, and need for input for mobility technique/safety. Personal factors affecting Pt at time of initial evaluation include: step(s) to enter environment, limited home support, new need for AD, and recent fall(s)/fall history. Pt will benefit from continued skilled acute OT services to address deficits as defined above and to maximize level independence/participation during ADLs and functional tasks to facilitate return toward PLOF and improved quality of life. Anticipate no OT needs upon discharge as symptoms are resolving.   Plan   Treatment Interventions ADL retraining;Visual perceptual retraining;Functional transfer training;UE strengthening/ROM;Endurance training;Cognitive reorientation;Patient/family training;Equipment evaluation/education;Neuromuscular reeducation;Fine motor coordination activities;Compensatory technique education;UE splinting;Continued evaluation;Cardiac education;Energy conservation;Activityengagement   Goal Expiration Date 05/26/24   OT Frequency 1-2x/wk    Discharge Recommendation   Rehab Resource Intensity Level, OT No post-acute rehabilitation needs   AM-PAC Daily Activity Inpatient   Lower Body Dressing 3   Bathing 3   Toileting 3   Upper Body Dressing 4   Grooming 3   Eating 4   Daily Activity Raw Score 20   Daily Activity Standardized Score (Calc for Raw Score >=11) 42.03   -[facility] Applied Cognition Inpatient   Following a Speech/Presentation 3   Understanding Ordinary Conversation 4   Taking Medications 3   Remembering Where Things Are Placed or Put Away 4   Remembering List of 4-5 Errands 3   Taking Care of Complicated Tasks 3   Applied Cognition Raw Score 20   Applied Cognition Standardized Score 41.76     The patient's raw score on the AM-PAC Daily Activity Inpatient Short Form is 20. A raw score of greater than or equal to 19 suggests the patient may benefit from discharge to home. Please refer to the recommendation of the Occupational Therapist for safe discharge planning.    Pt goals to be met by 5/26/2024    Pt will demonstrate ability to complete grooming/hygiene tasks @ I after set-up.  Pt will demonstrate ability to complete supine<>sit @ I in order to increase safety and independence during ADL tasks.  Pt will demonstrate ability to complete UB ADLs including washing/dressing @ I in order to increase performance and participation during meaningful tasks  Pt will demonstrate ability to complete LB dressing @ I in order to increase safety and independence during meaningful tasks.   Pt will demonstrate ability to kody/doff socks/shoes while sitting EOB @ I in order to increase safety and independence during meaningful tasks.   Pt will demonstrate ability to complete toileting tasks including CM and pericare @ I in order to increase safety and independence during meaningful tasks.  Pt will demonstrate ability to complete EOB, chair, toilet/commode transfers @ I in order to increase performance and participation during functional tasks.  Pt will  demonstrate ability to stand for 8-10 minutes while maintaining Good balance without the use of AD during ADL tasks.  Pt will demonstrate ability to tolerate 30-35 minute OT session with no vc'ing for deep breathing or use of energy conservation techniques in order to increase activity tolerance during functional tasks.   Pt will demonstrate Good carryover of use of energy conservation/compensatory strategies during ADLs and functional tasks in order to increase safety and reduce risk for falls.   Pt will demonstrate Good attention and participation in continued evaluation of functional ambulation house hold distances in order to assist with safe d/c planning.  Pt will attend to continued cognitive assessments 100% of the time in order to provide most appropriate d/c recommendations.   Pt will follow 100% simple 2-step commands and be A&O x4 consistently with environmental cues to increase participation in functional activities.   Pt will identify 3 areas of interest/hobbies and 1 intervention on how to incorporate into daily life in order to increase interaction with environment and peers as well as increase participation in meaningful tasks.   Pt will demonstrate 100% carryover of BUE HEP in order to increase BUE MS and increase performance during functional tasks upon d/c home.    Amy Lowry, OTR/L

## 2024-05-12 NOTE — CASE MANAGEMENT
Case Management Discharge Planning Note    Patient name Agatha Villeda  Location /-01 MRN [id number]  : 1945 Date 2024       Current Admission Date: 5/10/2024  Current Admission Diagnosis:Acute CVA (cerebrovascular accident) (HCC)   Patient Active Problem List    Diagnosis Date Noted    Acute CVA (cerebrovascular accident) (HCC) 05/10/2024    Acute anemia 05/10/2024      LOS (days): 2  Geometric Mean LOS (GMLOS) (days): 1.9  Days to GMLOS:0.1     OBJECTIVE:  Risk of Unplanned Readmission Score: 8.31         Current admission status: Inpatient   Preferred Pharmacy:   [id number] [place], [place] - [address] [address]  Phone: [phone] Fax: [phone]    Primary Care Provider: Nasrin Garcia MD    Primary Insurance: MEDICARE  Secondary Insurance: BLUE CROSS    DISCHARGE DETAILS:           A rollator has been recommended for pt at time of discharge, as per request, CM pacing order to [facility] in [place] for pricing.

## 2024-05-12 NOTE — PROGRESS NOTES
"[facility]  Progress Note  Name: Agatha Villeda I  MRN: [id number]  Unit/Bed#: -01 I Date of Admission: 5/10/2024   Date of Service: 5/12/2024 I Hospital Day: 2    Assessment/Plan   * Acute CVA (cerebrovascular accident) (HCC)  Assessment & Plan  Presented with R leg weakness that began 3 days ago that has not improved  Possibly 2/2 to acute stroke  Ct scan revealed  \"Hypodensities within the left parietal lobe and left cerebellar hemisphere may represent age-indeterminate infarct\"  MRI revealed \" Multifocal acute/subacute infarcts involving bilateral frontoparietal and occipital lobes as well as left cerebellum suggesting embolic etiology\"  Neurology consulted  Will continue with aspirin Plavix, statin  Will need event monitor outpatient will provide cardiology referral  PT/OT OT recommending home therapy    Acute anemia  Assessment & Plan  Noted to have hgb 5.8  No overt bleeding  No previous hgb so may not be acute considering low mcv  Received 2 units packed red blood cells with appropriate compensation  Hemoglobin now 9  No overt bleeding  Noted to have severe iron deficiency anemia  No previous colonoscopy/endoscopy  Will consult GI for hopeful colonoscopy/endoscopy on Monday  Hemoglobin is since stabilized  Follow-up GI consultation         VTE Pharmacologic Prophylaxis:   Pharmacologic: Heparin  Mechanical VTE Prophylaxis in Place: Yes    Patient Centered Rounds: I have performed bedside rounds with nursing staff today.    Discussions with Specialists or Other Care Team Provider: cm, nursing    Education and Discussions with Family / Patient: pt, granddaughter    Time Spent for Care: 30 minutes.  More than 50% of total time spent on counseling and coordination of care as described above.    Current Length of Stay: 2 day(s)    Current Patient Status: Inpatient   Certification Statement: The patient will continue to require additional inpatient hospital stay due to see " below    Discharge Plan: Pending further evaluation of acute anemia.  Dissipate approximately 24 to 48 hours    Code Status: Level 1 - Full Code      Subjective:   Denies chest pain, shortness of breath, cough, fevers, chills    Objective:     Vitals:   Temp (24hrs), Av.6 °F (36.4 °C), Min:97.2 °F (36.2 °C), Max:98 °F (36.7 °C)    Temp:  [97.2 °F (36.2 °C)-98 °F (36.7 °C)] 97.5 °F (36.4 °C)  HR:  [] 95  Resp:  [16-18] 17  BP: (136-175)/(66-89) 151/66  SpO2:  [93 %-97 %] 97 %  Body mass index is 24.2 kg/m².     Input and Output Summary (last 24 hours):       Intake/Output Summary (Last 24 hours) at 2024 1208  Last data filed at 2024 1100  Gross per 24 hour   Intake 600 ml   Output 1200 ml   Net -600 ml       Physical Exam:     Physical Exam  Constitutional:       General: She is not in acute distress.     Appearance: She is well-developed. She is not diaphoretic.   HENT:      Head: Normocephalic and atraumatic.      Nose: Nose normal.      Mouth/Throat:      Pharynx: No oropharyngeal exudate.   Eyes:      General: No scleral icterus.        Right eye: No discharge.         Left eye: No discharge.      Conjunctiva/sclera: Conjunctivae normal.   Neck:      Thyroid: No thyromegaly.      Vascular: No JVD.   Cardiovascular:      Rate and Rhythm: Normal rate and regular rhythm.      Heart sounds: Normal heart sounds. No murmur heard.     No friction rub. No gallop.   Pulmonary:      Effort: Pulmonary effort is normal. No respiratory distress.      Breath sounds: Normal breath sounds. No wheezing or rales.   Chest:      Chest wall: No tenderness.   Abdominal:      General: Bowel sounds are normal. There is no distension.      Palpations: Abdomen is soft.      Tenderness: There is no abdominal tenderness. There is no guarding or rebound.   Musculoskeletal:         General: No tenderness or deformity. Normal range of motion.      Cervical back: Normal range of motion and neck supple.   Skin:     General:  Skin is warm and dry.      Findings: No erythema or rash.   Neurological:      Mental Status: She is alert. Mental status is at baseline.      Cranial Nerves: No cranial nerve deficit.      Sensory: No sensory deficit.      Motor: No abnormal muscle tone.      Coordination: Coordination normal.       Additional Data:     Labs:    Results from last 7 days   Lab Units 05/11/24  0436   WBC Thousand/uL 9.03   HEMOGLOBIN g/dL 9.2*   HEMATOCRIT % 31.3*   PLATELETS Thousands/uL 614*   SEGS PCT % 76*   LYMPHO PCT % 13*   MONO PCT % 7   EOS PCT % 3     Results from last 7 days   Lab Units 05/11/24  0436 05/10/24  0959   SODIUM mmol/L 137 138   POTASSIUM mmol/L 3.7 3.8   CHLORIDE mmol/L 105 104   CO2 mmol/L 24 24   BUN mg/dL 9 12   CREATININE mg/dL 0.80 0.93   ANION GAP mmol/L 8 10   CALCIUM mg/dL 9.1 9.4   ALBUMIN g/dL  --  4.0   TOTAL BILIRUBIN mg/dL  --  0.29   ALK PHOS U/L  --  68   ALT U/L  --  11   AST U/L  --  16   GLUCOSE RANDOM mg/dL 96 126             Results from last 7 days   Lab Units 05/11/24  0436   HEMOGLOBIN A1C % 5.5               * I Have Reviewed All Lab Data Listed Above.  * Additional Pertinent Lab Tests Reviewed: All Labs Within Last 24 Hours Reviewed    Imaging:    Imaging Reports Reviewed Today Include: na  Imaging Personally Reviewed by Myself Includes:  na    Recent Cultures (last 7 days):           Last 24 Hours Medication List:   Current Facility-Administered Medications   Medication Dose Route Frequency Provider Last Rate    acetaminophen  650 mg Oral Q4H PRN David Dye MD      amLODIPine  5 mg Oral Daily David Dye MD      aspirin  81 mg Oral Daily David Dye MD      atorvastatin  40 mg Oral QPM David Dye MD      clopidogrel  75 mg Oral Daily David Dye MD      iron sucrose  200 mg Intravenous Daily David Dye  mg (05/12/24 0841)    polyethylene glycol  17 g Oral Daily PRN David Dye MD          Today, Patient Was Seen By: David Dye MD    ** Please Note: Dictation  voice to text software may have been used in the creation of this document. **

## 2024-05-12 NOTE — PLAN OF CARE
Problem: PAIN - ADULT  Goal: Verbalizes/displays adequate comfort level or baseline comfort level  Description: Interventions:  - Encourage patient to monitor pain and request assistance  - Assess pain using appropriate pain scale  - Administer analgesics based on type and severity of pain and evaluate response  - Implement non-pharmacological measures as appropriate and evaluate response  - Consider cultural and social influences on pain and pain management  - Notify physician/advanced practitioner if interventions unsuccessful or patient reports new pain  Outcome: Progressing     Problem: INFECTION - ADULT  Goal: Absence or prevention of progression during hospitalization  Description: INTERVENTIONS:  - Assess and monitor for signs and symptoms of infection  - Monitor lab/diagnostic results  - Monitor all insertion sites, i.e. indwelling lines, tubes, and drains  - Monitor endotracheal if appropriate and nasal secretions for changes in amount and color  - [place] appropriate cooling/warming therapies per order  - Administer medications as ordered  - Instruct and encourage patient and family to use good hand hygiene technique  - Identify and instruct in appropriate isolation precautions for identified infection/condition  Outcome: Progressing     Problem: Knowledge Deficit  Goal: Patient/family/caregiver demonstrates understanding of disease process, treatment plan, medications, and discharge instructions  Description: Complete learning assessment and assess knowledge base.  Interventions:  - Provide teaching at level of understanding  - Provide teaching via preferred learning methods  Outcome: Progressing     Problem: DISCHARGE PLANNING  Goal: Discharge to home or other facility with appropriate resources  Description: INTERVENTIONS:  - Identify barriers to discharge w/patient and caregiver  - Arrange for needed discharge resources and transportation as appropriate  - Identify discharge learning needs (meds,  wound care, etc.)  - Arrange for interpretive services to assist at discharge as needed  - Refer to Case Management Department for coordinating discharge planning if the patient needs post-hospital services based on physician/advanced practitioner order or complex needs related to functional status, cognitive ability, or social support system  Outcome: Progressing     Problem: CARDIOVASCULAR - ADULT  Goal: Maintains optimal cardiac output and hemodynamic stability  Description: INTERVENTIONS:  - Monitor I/O, vital signs and rhythm  - Monitor for S/S and trends of decreased cardiac output  - Administer and titrate ordered vasoactive medications to optimize hemodynamic stability  - Assess quality of pulses, skin color and temperature  - Assess for signs of decreased coronary artery perfusion  - Instruct patient to report change in severity of symptoms  Outcome: Progressing     Problem: GASTROINTESTINAL - ADULT  Goal: Minimal or absence of nausea and/or vomiting  Description: INTERVENTIONS:  - Administer IV fluids if ordered to ensure adequate hydration  - Maintain NPO status until nausea and vomiting are resolved  - Nasogastric tube if ordered  - Administer ordered antiemetic medications as needed  - Provide nonpharmacologic comfort measures as appropriate  - Advance diet as tolerated, if ordered  - Consider nutrition services referral to assist patient with adequate nutrition and appropriate food choices  Outcome: Progressing     Problem: METABOLIC, FLUID AND ELECTROLYTES - ADULT  Goal: Electrolytes maintained within normal limits  Description: INTERVENTIONS:  - Monitor labs and assess patient for signs and symptoms of electrolyte imbalances  - Administer electrolyte replacement as ordered  - Monitor response to electrolyte replacements, including repeat lab results as appropriate  - Instruct patient on fluid and nutrition as appropriate  Outcome: Progressing     Problem: HEMATOLOGIC - ADULT  Goal: Maintains  hematologic stability  Description: INTERVENTIONS  - Assess for signs and symptoms of bleeding or hemorrhage  - Monitor labs  - Administer supportive blood products/factors as ordered and appropriate  Outcome: Progressing

## 2024-05-12 NOTE — ASSESSMENT & PLAN NOTE
"Presented with R leg weakness that began 3 days ago that has not improved  Possibly 2/2 to acute stroke  Ct scan revealed  \"Hypodensities within the left parietal lobe and left cerebellar hemisphere may represent age-indeterminate infarct\"  MRI revealed \" Multifocal acute/subacute infarcts involving bilateral frontoparietal and occipital lobes as well as left cerebellum suggesting embolic etiology\"  Neurology consulted  Will continue with aspirin Plavix, statin  Will need event monitor outpatient will provide cardiology referral  PT/OT OT recommending home therapy  "

## 2024-05-12 NOTE — PLAN OF CARE
Problem: OCCUPATIONAL THERAPY ADULT  Goal: Performs self-care activities at highest level of function for planned discharge setting.  See evaluation for individualized goals.  Description: Treatment Interventions: ADL retraining, Visual perceptual retraining, Functional transfer training, UE strengthening/ROM, Endurance training, Cognitive reorientation, Patient/family training, Equipment evaluation/education, Neuromuscular reeducation, Fine motor coordination activities, Compensatory technique education, UE splinting, Continued evaluation, Cardiac education, Energy conservation, Activityengagement          See flowsheet documentation for full assessment, interventions and recommendations.   Note: Limitation: Decreased high-level ADLs, Decreased endurance  Prognosis: Good  Assessment: Pt is a 79 y.o. female, admitted to [facility] [place] 5/10/2024 d/t experiencing RLE weakness. Dx: acute CVA. Pt with PMHx impacting their performance during ADL tasks, including: B cataracts. Prior to admission to the hospital Pt was performing ADLs without physical assistance. IADLs without physical assistance. Functional transfers/ambulation without physical assistance. Cognitive status was PTA was Intact. OT order placed to assess Pt's ADLs, cognitive status, and performance during functional tasks in order to maximize safety and independence while making most appropriate d/c recommendations. Pt's clinical presentation is currently unstable/unpredictable given new onset deficits that effect Pt's occupational performance and ability to safely return to OF including decrease standing balance, decrease performance during ADL tasks, decrease UB MS, and decrease performance during functional transfers combined with medical complications of abnormal H&H, abnormal CBC, and need for input for mobility technique/safety. Personal factors affecting Pt at time of initial evaluation include: step(s) to enter environment, limited home support, new need  for AD, and recent fall(s)/fall history. Pt will benefit from continued skilled acute OT services to address deficits as defined above and to maximize level independence/participation during ADLs and functional tasks to facilitate return toward PLOF and improved quality of life. Anticipate no OT needs upon discharge as symptoms are resolving.     Rehab Resource Intensity Level, OT: No post-acute rehabilitation needs

## 2024-05-13 LAB
ANION GAP SERPL CALCULATED.3IONS-SCNC: 7 MMOL/L (ref 4–13)
BASOPHILS # BLD AUTO: 0.09 THOUSANDS/ÂΜL (ref 0–0.1)
BASOPHILS NFR BLD AUTO: 1 % (ref 0–1)
BUN SERPL-MCNC: 11 MG/DL (ref 5–25)
CALCIUM SERPL-MCNC: 9.5 MG/DL (ref 8.4–10.2)
CHLORIDE SERPL-SCNC: 104 MMOL/L (ref 96–108)
CO2 SERPL-SCNC: 26 MMOL/L (ref 21–32)
CREAT SERPL-MCNC: 0.78 MG/DL (ref 0.6–1.3)
EOSINOPHIL # BLD AUTO: 0.27 THOUSAND/ÂΜL (ref 0–0.61)
EOSINOPHIL NFR BLD AUTO: 2 % (ref 0–6)
ERYTHROCYTE [DISTWIDTH] IN BLOOD BY AUTOMATED COUNT: 21.8 % (ref 11.6–15.1)
GFR SERPL CREATININE-BSD FRML MDRD: 72 ML/MIN/1.73SQ M
GLUCOSE SERPL-MCNC: 102 MG/DL (ref 65–140)
HCT VFR BLD AUTO: 37.3 % (ref 34.8–46.1)
HGB BLD-MCNC: 10.6 G/DL (ref 11.5–15.4)
IMM GRANULOCYTES # BLD AUTO: 0.05 THOUSAND/UL (ref 0–0.2)
IMM GRANULOCYTES NFR BLD AUTO: 0 % (ref 0–2)
LYMPHOCYTES # BLD AUTO: 1.51 THOUSANDS/ÂΜL (ref 0.6–4.47)
LYMPHOCYTES NFR BLD AUTO: 13 % (ref 14–44)
MCH RBC QN AUTO: 21.6 PG (ref 26.8–34.3)
MCHC RBC AUTO-ENTMCNC: 28.4 G/DL (ref 31.4–37.4)
MCV RBC AUTO: 76 FL (ref 82–98)
MONOCYTES # BLD AUTO: 0.9 THOUSAND/ÂΜL (ref 0.17–1.22)
MONOCYTES NFR BLD AUTO: 8 % (ref 4–12)
NEUTROPHILS # BLD AUTO: 8.42 THOUSANDS/ÂΜL (ref 1.85–7.62)
NEUTS SEG NFR BLD AUTO: 76 % (ref 43–75)
NRBC BLD AUTO-RTO: 0 /100 WBCS
PLATELET # BLD AUTO: 683 THOUSANDS/UL (ref 149–390)
PMV BLD AUTO: 8.6 FL (ref 8.9–12.7)
POTASSIUM SERPL-SCNC: 4.4 MMOL/L (ref 3.5–5.3)
RBC # BLD AUTO: 4.91 MILLION/UL (ref 3.81–5.12)
SODIUM SERPL-SCNC: 137 MMOL/L (ref 135–147)
WBC # BLD AUTO: 11.24 THOUSAND/UL (ref 4.31–10.16)

## 2024-05-13 PROCEDURE — 85025 COMPLETE CBC W/AUTO DIFF WBC: CPT | Performed by: INTERNAL MEDICINE

## 2024-05-13 PROCEDURE — 80048 BASIC METABOLIC PNL TOTAL CA: CPT | Performed by: INTERNAL MEDICINE

## 2024-05-13 PROCEDURE — 99232 SBSQ HOSP IP/OBS MODERATE 35: CPT | Performed by: INTERNAL MEDICINE

## 2024-05-13 PROCEDURE — 92526 ORAL FUNCTION THERAPY: CPT

## 2024-05-13 RX ADMIN — HEPARIN SODIUM 5000 UNITS: 5000 INJECTION INTRAVENOUS; SUBCUTANEOUS at 21:00

## 2024-05-13 RX ADMIN — AMLODIPINE BESYLATE 5 MG: 5 TABLET ORAL at 08:18

## 2024-05-13 RX ADMIN — CLOPIDOGREL 75 MG: 75 TABLET ORAL at 08:18

## 2024-05-13 RX ADMIN — ATORVASTATIN CALCIUM 40 MG: 40 TABLET, FILM COATED ORAL at 17:00

## 2024-05-13 RX ADMIN — IRON SUCROSE 200 MG: 20 INJECTION, SOLUTION INTRAVENOUS at 09:56

## 2024-05-13 RX ADMIN — ASPIRIN 81 MG CHEWABLE TABLET 81 MG: 81 TABLET CHEWABLE at 08:18

## 2024-05-13 RX ADMIN — HEPARIN SODIUM 5000 UNITS: 5000 INJECTION INTRAVENOUS; SUBCUTANEOUS at 13:18

## 2024-05-13 RX ADMIN — HEPARIN SODIUM 5000 UNITS: 5000 INJECTION INTRAVENOUS; SUBCUTANEOUS at 05:23

## 2024-05-13 NOTE — PLAN OF CARE
Problem: Neurological Deficit  Goal: Neurological status is stable or improving  Description: Interventions:  - Monitor and assess patient's level of consciousness, motor function, sensory function, and level of assistance needed for ADLs.   - Monitor and report changes from baseline. Collaborate with interdisciplinary team to initiate plan and implement interventions as ordered.   - Provide and maintain a safe environment.  - Consider seizure precautions.  - Consider fall precautions.  - Consider aspiration precautions.  - Consider bleeding precautions.  Outcome: Progressing     Problem: Activity Intolerance/Impaired Mobility  Goal: Mobility/activity is maintained at optimum level for patient  Description: Interventions:  - Assess and monitor patient  barriers to mobility and need for assistive/adaptive devices.  - Assess patient's emotional response to limitations.  - Collaborate with interdisciplinary team and initiate plans and interventions as ordered.  - Encourage independent activity per ability.  - Maintain proper body alignment.  - Perform active/passive rom as tolerated/ordered.  - Plan activities to conserve energy.  - Turn patient as appropriate  Outcome: Progressing     Problem: Potential for Aspiration  Goal: Non-ventilated patient's risk of aspiration is minimized  Description: Assess and monitor vital signs, respiratory status, and labs (WBC).  Monitor for signs of aspiration (tachypnea, cough, rales, wheezing, cyanosis, fever).    - Assess and monitor patient's ability to swallow.  - Place patient up in chair to eat if possible.  - HOB up at 90 degrees to eat if unable to get patient up into chair.  - Supervise patient during oral intake.   - Instruct patient/ family to take small bites.  - Instruct patient/ family to take small single sips when taking liquids.  - Follow patient-specific strategies generated by speech pathologist.  Outcome: Progressing

## 2024-05-13 NOTE — PROGRESS NOTES
"[facility]  Progress Note  Name: Agatha Villeda I  MRN: [id number]  Unit/Bed#: -01 I Date of Admission: 5/10/2024   Date of Service: 5/13/2024 I Hospital Day: 3    Assessment/Plan   * Acute CVA (cerebrovascular accident) (HCC)  Assessment & Plan  Presented with R leg weakness that began 3 days ago that has not improved  Possibly 2/2 to acute stroke  Ct scan revealed  \"Hypodensities within the left parietal lobe and left cerebellar hemisphere may represent age-indeterminate infarct\"  MRI revealed \" Multifocal acute/subacute infarcts involving bilateral frontoparietal and occipital lobes as well as left cerebellum suggesting embolic etiology\"  Appreciate neurology consultation  Will continue with aspirin Plavix, statin  Will need event monitor outpatient will provide cardiology referral  PT/OT  recommending home therapy    Acute anemia  Assessment & Plan  Noted to have hgb 5.8  No overt bleeding  No previous hgb so may not be acute considering low mcv  Received 2 units packed red blood cells with appropriate compensation  Noted to have severe iron deficiency anemia  Continue IV Venofer  Hemoglobin is since stabilized approximately 10  Tentative plan for possible EGD/colonoscopy  Follow-up formal GI recommendations         VTE Pharmacologic Prophylaxis:   Pharmacologic: Heparin  Mechanical VTE Prophylaxis in Place: Yes    Patient Centered Rounds: I have performed bedside rounds with nursing staff today.    Discussions with Specialists or Other Care Team Provider: cm, nursing    Education and Discussions with Family / Patient: pt, granddaughter    Time Spent for Care: 30 minutes.  More than 50% of total time spent on counseling and coordination of care as described above.    Current Length of Stay: 3 day(s)    Current Patient Status: Inpatient   Certification Statement: The patient will continue to require additional inpatient hospital stay due to see below    Discharge Plan: " Pending further evaluation acute anemia.  Anticipate approximately 24 to 48 hours    Code Status: Level 1 - Full Code      Subjective:   Denies fevers, chills, cough, shortness of breath    Objective:     Vitals:   Temp (24hrs), Av.3 °F (36.3 °C), Min:96.8 °F (36 °C), Max:97.7 °F (36.5 °C)    Temp:  [96.8 °F (36 °C)-97.7 °F (36.5 °C)] 97.3 °F (36.3 °C)  HR:  [] 96  Resp:  [17-20] 19  BP: (135-152)/(66-86) 150/80  SpO2:  [92 %-97 %] 97 %  Body mass index is 24.2 kg/m².     Input and Output Summary (last 24 hours):       Intake/Output Summary (Last 24 hours) at 2024 1011  Last data filed at 2024 0904  Gross per 24 hour   Intake 540 ml   Output 1150 ml   Net -610 ml       Physical Exam:     Physical Exam  Constitutional:       General: She is not in acute distress.     Appearance: She is well-developed. She is not diaphoretic.   HENT:      Head: Normocephalic and atraumatic.      Nose: Nose normal.      Mouth/Throat:      Pharynx: No oropharyngeal exudate.   Eyes:      General: No scleral icterus.        Right eye: No discharge.         Left eye: No discharge.      Conjunctiva/sclera: Conjunctivae normal.   Neck:      Thyroid: No thyromegaly.      Vascular: No JVD.   Cardiovascular:      Rate and Rhythm: Normal rate and regular rhythm.      Heart sounds: Normal heart sounds. No murmur heard.     No friction rub. No gallop.   Pulmonary:      Effort: Pulmonary effort is normal. No respiratory distress.      Breath sounds: Normal breath sounds. No wheezing or rales.   Chest:      Chest wall: No tenderness.   Abdominal:      General: Bowel sounds are normal. There is no distension.      Palpations: Abdomen is soft.      Tenderness: There is no abdominal tenderness. There is no guarding or rebound.   Musculoskeletal:         General: No tenderness or deformity. Normal range of motion.      Cervical back: Normal range of motion and neck supple.   Skin:     General: Skin is warm and dry.      Findings: No  erythema or rash.   Neurological:      Mental Status: She is alert. Mental status is at baseline.      Cranial Nerves: No cranial nerve deficit.      Sensory: No sensory deficit.      Motor: No abnormal muscle tone.      Coordination: Coordination normal.         Additional Data:     Labs:    Results from last 7 days   Lab Units 05/13/24  0522   WBC Thousand/uL 11.24*   HEMOGLOBIN g/dL 10.6*   HEMATOCRIT % 37.3   PLATELETS Thousands/uL 683*   SEGS PCT % 76*   LYMPHO PCT % 13*   MONO PCT % 8   EOS PCT % 2     Results from last 7 days   Lab Units 05/13/24  0522 05/11/24  0436 05/10/24  0959   SODIUM mmol/L 137   < > 138   POTASSIUM mmol/L 4.4   < > 3.8   CHLORIDE mmol/L 104   < > 104   CO2 mmol/L 26   < > 24   BUN mg/dL 11   < > 12   CREATININE mg/dL 0.78   < > 0.93   ANION GAP mmol/L 7   < > 10   CALCIUM mg/dL 9.5   < > 9.4   ALBUMIN g/dL  --   --  4.0   TOTAL BILIRUBIN mg/dL  --   --  0.29   ALK PHOS U/L  --   --  68   ALT U/L  --   --  11   AST U/L  --   --  16   GLUCOSE RANDOM mg/dL 102   < > 126    < > = values in this interval not displayed.             Results from last 7 days   Lab Units 05/11/24  0436   HEMOGLOBIN A1C % 5.5               * I Have Reviewed All Lab Data Listed Above.  * Additional Pertinent Lab Tests Reviewed: All Labs Within Last 24 Hours Reviewed    Imaging:    Imaging Reports Reviewed Today Include: na  Imaging Personally Reviewed by Myself Includes:  na    Recent Cultures (last 7 days):           Last 24 Hours Medication List:   Current Facility-Administered Medications   Medication Dose Route Frequency Provider Last Rate    acetaminophen  650 mg Oral Q4H PRN David Dye MD      amLODIPine  5 mg Oral Daily David Dye MD      aspirin  81 mg Oral Daily David Dye MD      atorvastatin  40 mg Oral QPM David Dye MD      clopidogrel  75 mg Oral Daily David Dye MD      heparin (porcine)  5,000 Units Subcutaneous Q8H [facility] David Dye MD      iron sucrose  200 mg Intravenous Daily  David Dye  mg (05/13/24 0956)    polyethylene glycol  17 g Oral Daily PRN David Dye MD          Today, Patient Was Seen By: David Dye MD    ** Please Note: Dictation voice to text software may have been used in the creation of this document. **

## 2024-05-13 NOTE — SPEECH THERAPY NOTE
Speech Language/Pathology    Speech/Language Pathology Progress Note    Patient Name: Agatha Villeda  Today's Date: 5/13/2024     Assessment:  Pt seen for f/u dysphagia therapy w/ breakfast meal. Currently on level 3 dental soft/ thin liquids. Pt reports intermittent difficulty w/ whole pills and difficulty w/ Danish toast ~2 days ago. Reports difficulty w/ pills is not new and happens intermittently at home, but that pt does not take meds daily.   Seen w/ breakfast meal of omelet, banana, thins via cup and straw. Meds were crushed in puree. Pt w/ adequate mastication and clearance, no overt s/s aspiration or c/o globus sensation.    Plan/Recommendations:  Continue current diet  Can trial meds whole in puree to assess tolerance  GI consult planned d/t anemia, pt may benefit from EGD as suspect globus sensation esophageal related    Marizol Cardenas MS CCC-SLP  5/13/2024

## 2024-05-13 NOTE — CASE MANAGEMENT
Case Management Discharge Planning Note    Patient name Agatha Villeda  Location /-01 MRN [id number]  : 1945 Date 2024       Current Admission Date: 5/10/2024  Current Admission Diagnosis:Acute CVA (cerebrovascular accident) (HCC)   Patient Active Problem List    Diagnosis Date Noted    Acute CVA (cerebrovascular accident) (HCC) 05/10/2024    Acute anemia 05/10/2024      LOS (days): 3  Geometric Mean LOS (GMLOS) (days): 1.9  Days to GMLOS:-1.2     OBJECTIVE:  Risk of Unplanned Readmission Score: 9.42         Current admission status: Inpatient   Preferred Pharmacy:   [id number] - [place], [place] - [address]  [address] [place] [id number]  Phone: [phone] Fax: [phone]    Primary Care Provider: Nasrin Garcia MD    Primary Insurance: MEDICARE  Secondary Insurance: BLUE CROSS    DISCHARGE DETAILS:           CM submitted blanket HHC referral in [facility]. CM to review options with patient.     1615 CM met with patient and grandchildBirgit. CM discussed rollator cost of $71.42. Granddaughter indicated she will secure rollator cheaper and declined referral. Patient already has RW.    CM discussed HHC vs outpatient PT with patient and granddaughter and both patient and granddaughter requested [facility] outpatient PT in [place] for patient.     CM to follow for medical stability for discharge.

## 2024-05-13 NOTE — PLAN OF CARE
Problem: PAIN - ADULT  Goal: Verbalizes/displays adequate comfort level or baseline comfort level  Description: Interventions:  - Encourage patient to monitor pain and request assistance  - Assess pain using appropriate pain scale  - Administer analgesics based on type and severity of pain and evaluate response  - Implement non-pharmacological measures as appropriate and evaluate response  - Consider cultural and social influences on pain and pain management  - Notify physician/advanced practitioner if interventions unsuccessful or patient reports new pain  Outcome: Progressing     Problem: INFECTION - ADULT  Goal: Absence or prevention of progression during hospitalization  Description: INTERVENTIONS:  - Assess and monitor for signs and symptoms of infection  - Monitor lab/diagnostic results  - Monitor all insertion sites, i.e. indwelling lines, tubes, and drains  - Monitor endotracheal if appropriate and nasal secretions for changes in amount and color  - [place] appropriate cooling/warming therapies per order  - Administer medications as ordered  - Instruct and encourage patient and family to use good hand hygiene technique  - Identify and instruct in appropriate isolation precautions for identified infection/condition  Outcome: Progressing  Goal: Absence of fever/infection during neutropenic period  Description: INTERVENTIONS:  - Monitor WBC    Outcome: Progressing     Problem: SAFETY ADULT  Goal: Patient will remain free of falls  Description: INTERVENTIONS:  - Educate patient/family on patient safety including physical limitations  - Instruct patient to call for assistance with activity   - Consult OT/PT to assist with strengthening/mobility   - Keep Call bell within reach  - Keep bed low and locked with side rails adjusted as appropriate  - Keep care items and personal belongings within reach  - Initiate and maintain comfort rounds  - Make Fall Risk Sign visible to staff  - Offer Toileting every  Hours,  in advance of need  - Initiate/Maintain alarm  - Obtain necessary fall risk management equipment  - Apply yellow socks and bracelet for high fall risk patients  - Consider moving patient to room near nurses station  Outcome: Progressing  Goal: Maintain or return to baseline ADL function  Description: INTERVENTIONS:  -  Assess patient's ability to carry out ADLs; assess patient's baseline for ADL function and identify physical deficits which impact ability to perform ADLs (bathing, care of mouth/teeth, toileting, grooming, dressing, etc.)  - Assess/evaluate cause of self-care deficits   - Assess range of motion  - Assess patient's mobility; develop plan if impaired  - Assess patient's need for assistive devices and provide as appropriate  - Encourage maximum independence but intervene and supervise when necessary  - Involve family in performance of ADLs  - Assess for home care needs following discharge   - Consider OT consult to assist with ADL evaluation and planning for discharge  - Provide patient education as appropriate  Outcome: Progressing  Goal: Maintains/Returns to pre admission functional level  Description: INTERVENTIONS:  - Perform AM-PAC 6 Click Basic Mobility/ Daily Activity assessment daily.  - Set and communicate daily mobility goal to care team and patient/family/caregiver.   - Collaborate with rehabilitation services on mobility goals if consulted  - Perform Range of Motion  times a day.  - Reposition patient every  hours.  - Dangle patient  times a day  - Stand patient  times a day  - Ambulate patient  times a day  - Out of bed to chair  times a day   - Out of bed for meals  times a day  - Out of bed for toileting  - Record patient progress and toleration of activity level   Outcome: Progressing     Problem: DISCHARGE PLANNING  Goal: Discharge to home or other facility with appropriate resources  Description: INTERVENTIONS:  - Identify barriers to discharge w/patient and caregiver  - Arrange for  needed discharge resources and transportation as appropriate  - Identify discharge learning needs (meds, wound care, etc.)  - Arrange for interpretive services to assist at discharge as needed  - Refer to Case Management Department for coordinating discharge planning if the patient needs post-hospital services based on physician/advanced practitioner order or complex needs related to functional status, cognitive ability, or social support system  Outcome: Progressing     Problem: Knowledge Deficit  Goal: Patient/family/caregiver demonstrates understanding of disease process, treatment plan, medications, and discharge instructions  Description: Complete learning assessment and assess knowledge base.  Interventions:  - Provide teaching at level of understanding  - Provide teaching via preferred learning methods  Outcome: Progressing     Problem: Nutrition/Hydration-ADULT  Goal: Nutrient/Hydration intake appropriate for improving, restoring or maintaining nutritional needs  Description: Monitor and assess patient's nutrition/hydration status for malnutrition. Collaborate with interdisciplinary team and initiate plan and interventions as ordered.  Monitor patient's weight and dietary intake as ordered or per policy. Utilize nutrition screening tool and intervene as necessary. Determine patient's food preferences and provide high-protein, high-caloric foods as appropriate.     INTERVENTIONS:  - Monitor oral intake, urinary output, labs, and treatment plans  - Assess nutrition and hydration status and recommend course of action  - Evaluate amount of meals eaten  - Assist patient with eating if necessary   - Allow adequate time for meals  - Recommend/ encourage appropriate diets, oral nutritional supplements, and vitamin/mineral supplements  - Order, calculate, and assess calorie counts as needed  - Recommend, monitor, and adjust tube feedings and TPN/PPN based on assessed needs  - Assess need for intravenous fluids  -  Provide specific nutrition/hydration education as appropriate  - Include patient/family/caregiver in decisions related to nutrition  Outcome: Progressing     Problem: CARDIOVASCULAR - ADULT  Goal: Maintains optimal cardiac output and hemodynamic stability  Description: INTERVENTIONS:  - Monitor I/O, vital signs and rhythm  - Monitor for S/S and trends of decreased cardiac output  - Administer and titrate ordered vasoactive medications to optimize hemodynamic stability  - Assess quality of pulses, skin color and temperature  - Assess for signs of decreased coronary artery perfusion  - Instruct patient to report change in severity of symptoms  Outcome: Progressing  Goal: Absence of cardiac dysrhythmias or at baseline rhythm  Description: INTERVENTIONS:  - Continuous cardiac monitoring, vital signs, obtain 12 lead EKG if ordered  - Administer antiarrhythmic and heart rate control medications as ordered  - Monitor electrolytes and administer replacement therapy as ordered  Outcome: Progressing     Problem: GASTROINTESTINAL - ADULT  Goal: Minimal or absence of nausea and/or vomiting  Description: INTERVENTIONS:  - Administer IV fluids if ordered to ensure adequate hydration  - Maintain NPO status until nausea and vomiting are resolved  - Nasogastric tube if ordered  - Administer ordered antiemetic medications as needed  - Provide nonpharmacologic comfort measures as appropriate  - Advance diet as tolerated, if ordered  - Consider nutrition services referral to assist patient with adequate nutrition and appropriate food choices  Outcome: Progressing  Goal: Maintains or returns to baseline bowel function  Description: INTERVENTIONS:  - Assess bowel function  - Encourage oral fluids to ensure adequate hydration  - Administer IV fluids if ordered to ensure adequate hydration  - Administer ordered medications as needed  - Encourage mobilization and activity  - Consider nutritional services referral to assist patient with  adequate nutrition and appropriate food choices  Outcome: Progressing  Goal: Maintains adequate nutritional intake  Description: INTERVENTIONS:  - Monitor percentage of each meal consumed  - Identify factors contributing to decreased intake, treat as appropriate  - Assist with meals as needed  - Monitor I&O, weight, and lab values if indicated  - Obtain nutrition services referral as needed  Outcome: Progressing  Goal: Establish and maintain optimal ostomy function  Description: INTERVENTIONS:  - Assess bowel function  - Encourage oral fluids to ensure adequate hydration  - Administer IV fluids if ordered to ensure adequate hydration   - Administer ordered medications as needed  - Encourage mobilization and activity  - Nutrition services referral to assist patient with appropriate food choices  - Assess stoma site  - Consider wound care consult   Outcome: Progressing  Goal: Oral mucous membranes remain intact  Description: INTERVENTIONS  - Assess oral mucosa and hygiene practices  - Implement preventative oral hygiene regimen  - Implement oral medicated treatments as ordered  - Initiate Nutrition services referral as needed  Outcome: Progressing     Problem: METABOLIC, FLUID AND ELECTROLYTES - ADULT  Goal: Electrolytes maintained within normal limits  Description: INTERVENTIONS:  - Monitor labs and assess patient for signs and symptoms of electrolyte imbalances  - Administer electrolyte replacement as ordered  - Monitor response to electrolyte replacements, including repeat lab results as appropriate  - Instruct patient on fluid and nutrition as appropriate  Outcome: Progressing  Goal: Fluid balance maintained  Description: INTERVENTIONS:  - Monitor labs   - Monitor I/O and WT  - Instruct patient on fluid and nutrition as appropriate  - Assess for signs & symptoms of volume excess or deficit  Outcome: Progressing  Goal: Glucose maintained within target range  Description: INTERVENTIONS:  - Monitor Blood Glucose as  ordered  - Assess for signs and symptoms of hyperglycemia and hypoglycemia  - Administer ordered medications to maintain glucose within target range  - Assess nutritional intake and initiate nutrition service referral as needed  Outcome: Progressing     Problem: HEMATOLOGIC - ADULT  Goal: Maintains hematologic stability  Description: INTERVENTIONS  - Assess for signs and symptoms of bleeding or hemorrhage  - Monitor labs  - Administer supportive blood products/factors as ordered and appropriate  Outcome: Progressing     Problem: Neurological Deficit  Goal: Neurological status is stable or improving  Description: Interventions:  - Monitor and assess patient's level of consciousness, motor function, sensory function, and level of assistance needed for ADLs.   - Monitor and report changes from baseline. Collaborate with interdisciplinary team to initiate plan and implement interventions as ordered.   - Provide and maintain a safe environment.  - Consider seizure precautions.  - Consider fall precautions.  - Consider aspiration precautions.  - Consider bleeding precautions.  Outcome: Progressing     Problem: Activity Intolerance/Impaired Mobility  Goal: Mobility/activity is maintained at optimum level for patient  Description: Interventions:  - Assess and monitor patient  barriers to mobility and need for assistive/adaptive devices.  - Assess patient's emotional response to limitations.  - Collaborate with interdisciplinary team and initiate plans and interventions as ordered.  - Encourage independent activity per ability.  - Maintain proper body alignment.  - Perform active/passive rom as tolerated/ordered.  - Plan activities to conserve energy.  - Turn patient as appropriate  Outcome: Progressing     Problem: Potential for Aspiration  Goal: Non-ventilated patient's risk of aspiration is minimized  Description: Assess and monitor vital signs, respiratory status, and labs (WBC).  Monitor for signs of aspiration  (tachypnea, cough, rales, wheezing, cyanosis, fever).    - Assess and monitor patient's ability to swallow.  - Place patient up in chair to eat if possible.  - HOB up at 90 degrees to eat if unable to get patient up into chair.  - Supervise patient during oral intake.   - Instruct patient/ family to take small bites.  - Instruct patient/ family to take small single sips when taking liquids.  - Follow patient-specific strategies generated by speech pathologist.  Outcome: Progressing

## 2024-05-13 NOTE — ASSESSMENT & PLAN NOTE
Noted to have hgb 5.8  No overt bleeding  No previous hgb so may not be acute considering low mcv  Received 2 units packed red blood cells with appropriate compensation  Noted to have severe iron deficiency anemia  Continue IV Venofer  Hemoglobin is since stabilized approximately 10  Tentative plan for possible EGD/colonoscopy  Follow-up formal GI recommendations

## 2024-05-14 LAB
ANION GAP SERPL CALCULATED.3IONS-SCNC: 8 MMOL/L (ref 4–13)
BASOPHILS # BLD AUTO: 0.08 THOUSANDS/ÂΜL (ref 0–0.1)
BASOPHILS NFR BLD AUTO: 1 % (ref 0–1)
BUN SERPL-MCNC: 14 MG/DL (ref 5–25)
CALCIUM SERPL-MCNC: 9.2 MG/DL (ref 8.4–10.2)
CHLORIDE SERPL-SCNC: 104 MMOL/L (ref 96–108)
CO2 SERPL-SCNC: 25 MMOL/L (ref 21–32)
CREAT SERPL-MCNC: 0.74 MG/DL (ref 0.6–1.3)
EOSINOPHIL # BLD AUTO: 0.24 THOUSAND/ÂΜL (ref 0–0.61)
EOSINOPHIL NFR BLD AUTO: 2 % (ref 0–6)
ERYTHROCYTE [DISTWIDTH] IN BLOOD BY AUTOMATED COUNT: 22.5 % (ref 11.6–15.1)
GFR SERPL CREATININE-BSD FRML MDRD: 77 ML/MIN/1.73SQ M
GLUCOSE SERPL-MCNC: 97 MG/DL (ref 65–140)
HCT VFR BLD AUTO: 37.2 % (ref 34.8–46.1)
HGB BLD-MCNC: 10.9 G/DL (ref 11.5–15.4)
IMM GRANULOCYTES # BLD AUTO: 0.05 THOUSAND/UL (ref 0–0.2)
IMM GRANULOCYTES NFR BLD AUTO: 0 % (ref 0–2)
LYMPHOCYTES # BLD AUTO: 1.38 THOUSANDS/ÂΜL (ref 0.6–4.47)
LYMPHOCYTES NFR BLD AUTO: 11 % (ref 14–44)
MCH RBC QN AUTO: 22.3 PG (ref 26.8–34.3)
MCHC RBC AUTO-ENTMCNC: 29.3 G/DL (ref 31.4–37.4)
MCV RBC AUTO: 76 FL (ref 82–98)
MONOCYTES # BLD AUTO: 0.8 THOUSAND/ÂΜL (ref 0.17–1.22)
MONOCYTES NFR BLD AUTO: 6 % (ref 4–12)
NEUTROPHILS # BLD AUTO: 10.13 THOUSANDS/ÂΜL (ref 1.85–7.62)
NEUTS SEG NFR BLD AUTO: 80 % (ref 43–75)
NRBC BLD AUTO-RTO: 0 /100 WBCS
PLATELET # BLD AUTO: 642 THOUSANDS/UL (ref 149–390)
PMV BLD AUTO: 8.6 FL (ref 8.9–12.7)
POTASSIUM SERPL-SCNC: 3.9 MMOL/L (ref 3.5–5.3)
RBC # BLD AUTO: 4.88 MILLION/UL (ref 3.81–5.12)
SODIUM SERPL-SCNC: 137 MMOL/L (ref 135–147)
WBC # BLD AUTO: 12.68 THOUSAND/UL (ref 4.31–10.16)

## 2024-05-14 PROCEDURE — 99232 SBSQ HOSP IP/OBS MODERATE 35: CPT | Performed by: PHYSICIAN ASSISTANT

## 2024-05-14 PROCEDURE — 80048 BASIC METABOLIC PNL TOTAL CA: CPT | Performed by: INTERNAL MEDICINE

## 2024-05-14 PROCEDURE — 92526 ORAL FUNCTION THERAPY: CPT

## 2024-05-14 PROCEDURE — 85025 COMPLETE CBC W/AUTO DIFF WBC: CPT | Performed by: INTERNAL MEDICINE

## 2024-05-14 RX ORDER — POLYETHYLENE GLYCOL 3350 17 G/17G
238 POWDER, FOR SOLUTION ORAL ONCE
Status: COMPLETED | OUTPATIENT
Start: 2024-05-15 | End: 2024-05-15

## 2024-05-14 RX ORDER — BISACODYL 5 MG/1
10 TABLET, DELAYED RELEASE ORAL ONCE
Status: COMPLETED | OUTPATIENT
Start: 2024-05-15 | End: 2024-05-15

## 2024-05-14 RX ADMIN — ASPIRIN 81 MG CHEWABLE TABLET 81 MG: 81 TABLET CHEWABLE at 09:56

## 2024-05-14 RX ADMIN — ATORVASTATIN CALCIUM 40 MG: 40 TABLET, FILM COATED ORAL at 17:09

## 2024-05-14 RX ADMIN — HEPARIN SODIUM 5000 UNITS: 5000 INJECTION INTRAVENOUS; SUBCUTANEOUS at 04:48

## 2024-05-14 RX ADMIN — AMLODIPINE BESYLATE 5 MG: 5 TABLET ORAL at 09:56

## 2024-05-14 RX ADMIN — CLOPIDOGREL 75 MG: 75 TABLET ORAL at 09:56

## 2024-05-14 RX ADMIN — HEPARIN SODIUM 5000 UNITS: 5000 INJECTION INTRAVENOUS; SUBCUTANEOUS at 13:03

## 2024-05-14 RX ADMIN — IRON SUCROSE 200 MG: 20 INJECTION, SOLUTION INTRAVENOUS at 09:59

## 2024-05-14 RX ADMIN — HEPARIN SODIUM 5000 UNITS: 5000 INJECTION INTRAVENOUS; SUBCUTANEOUS at 21:00

## 2024-05-14 NOTE — PLAN OF CARE
Problem: PAIN - ADULT  Goal: Verbalizes/displays adequate comfort level or baseline comfort level  Description: Interventions:  - Encourage patient to monitor pain and request assistance  - Assess pain using appropriate pain scale  - Administer analgesics based on type and severity of pain and evaluate response  - Implement non-pharmacological measures as appropriate and evaluate response  - Consider cultural and social influences on pain and pain management  - Notify physician/advanced practitioner if interventions unsuccessful or patient reports new pain  Outcome: Progressing     Problem: INFECTION - ADULT  Goal: Absence or prevention of progression during hospitalization  Description: INTERVENTIONS:  - Assess and monitor for signs and symptoms of infection  - Monitor lab/diagnostic results  - Monitor all insertion sites, i.e. indwelling lines, tubes, and drains  - Monitor endotracheal if appropriate and nasal secretions for changes in amount and color  - [place] appropriate cooling/warming therapies per order  - Administer medications as ordered  - Instruct and encourage patient and family to use good hand hygiene technique  - Identify and instruct in appropriate isolation precautions for identified infection/condition  Outcome: Progressing  Goal: Absence of fever/infection during neutropenic period  Description: INTERVENTIONS:  - Monitor WBC    Outcome: Progressing     Problem: SAFETY ADULT  Goal: Patient will remain free of falls  Description: INTERVENTIONS:  - Educate patient/family on patient safety including physical limitations  - Instruct patient to call for assistance with activity   - Consult OT/PT to assist with strengthening/mobility   - Keep Call bell within reach  - Keep bed low and locked with side rails adjusted as appropriate  - Keep care items and personal belongings within reach  - Initiate and maintain comfort rounds  - Make Fall Risk Sign visible to staff  - Offer Toileting every  Hours,  in advance of need  - Initiate/Maintain alarm  - Obtain necessary fall risk management equipment:   - Apply yellow socks and bracelet for high fall risk patients  - Consider moving patient to room near nurses station  Outcome: Progressing  Goal: Maintain or return to baseline ADL function  Description: INTERVENTIONS:  -  Assess patient's ability to carry out ADLs; assess patient's baseline for ADL function and identify physical deficits which impact ability to perform ADLs (bathing, care of mouth/teeth, toileting, grooming, dressing, etc.)  - Assess/evaluate cause of self-care deficits   - Assess range of motion  - Assess patient's mobility; develop plan if impaired  - Assess patient's need for assistive devices and provide as appropriate  - Encourage maximum independence but intervene and supervise when necessary  - Involve family in performance of ADLs  - Assess for home care needs following discharge   - Consider OT consult to assist with ADL evaluation and planning for discharge  - Provide patient education as appropriate  Outcome: Progressing  Goal: Maintains/Returns to pre admission functional level  Description: INTERVENTIONS:  - Perform AM-PAC 6 Click Basic Mobility/ Daily Activity assessment daily.  - Set and communicate daily mobility goal to care team and patient/family/caregiver.   - Collaborate with rehabilitation services on mobility goals if consulted  - Perform Range of Motion  times a day.  - Reposition patient every  hours.  - Dangle patient  times a day  - Stand patient  times a day  - Ambulate patient  times a day  - Out of bed to chair  times a day   - Out of bed for meals  times a day  - Out of bed for toileting  - Record patient progress and toleration of activity level   Outcome: Progressing     Problem: DISCHARGE PLANNING  Goal: Discharge to home or other facility with appropriate resources  Description: INTERVENTIONS:  - Identify barriers to discharge w/patient and caregiver  - Arrange for  needed discharge resources and transportation as appropriate  - Identify discharge learning needs (meds, wound care, etc.)  - Arrange for interpretive services to assist at discharge as needed  - Refer to Case Management Department for coordinating discharge planning if the patient needs post-hospital services based on physician/advanced practitioner order or complex needs related to functional status, cognitive ability, or social support system  Outcome: Progressing     Problem: Knowledge Deficit  Goal: Patient/family/caregiver demonstrates understanding of disease process, treatment plan, medications, and discharge instructions  Description: Complete learning assessment and assess knowledge base.  Interventions:  - Provide teaching at level of understanding  - Provide teaching via preferred learning methods  Outcome: Progressing     Problem: Nutrition/Hydration-ADULT  Goal: Nutrient/Hydration intake appropriate for improving, restoring or maintaining nutritional needs  Description: Monitor and assess patient's nutrition/hydration status for malnutrition. Collaborate with interdisciplinary team and initiate plan and interventions as ordered.  Monitor patient's weight and dietary intake as ordered or per policy. Utilize nutrition screening tool and intervene as necessary. Determine patient's food preferences and provide high-protein, high-caloric foods as appropriate.     INTERVENTIONS:  - Monitor oral intake, urinary output, labs, and treatment plans  - Assess nutrition and hydration status and recommend course of action  - Evaluate amount of meals eaten  - Assist patient with eating if necessary   - Allow adequate time for meals  - Recommend/ encourage appropriate diets, oral nutritional supplements, and vitamin/mineral supplements  - Order, calculate, and assess calorie counts as needed  - Recommend, monitor, and adjust tube feedings and TPN/PPN based on assessed needs  - Assess need for intravenous fluids  -  Provide specific nutrition/hydration education as appropriate  - Include patient/family/caregiver in decisions related to nutrition  Outcome: Progressing     Problem: CARDIOVASCULAR - ADULT  Goal: Maintains optimal cardiac output and hemodynamic stability  Description: INTERVENTIONS:  - Monitor I/O, vital signs and rhythm  - Monitor for S/S and trends of decreased cardiac output  - Administer and titrate ordered vasoactive medications to optimize hemodynamic stability  - Assess quality of pulses, skin color and temperature  - Assess for signs of decreased coronary artery perfusion  - Instruct patient to report change in severity of symptoms  Outcome: Progressing  Goal: Absence of cardiac dysrhythmias or at baseline rhythm  Description: INTERVENTIONS:  - Continuous cardiac monitoring, vital signs, obtain 12 lead EKG if ordered  - Administer antiarrhythmic and heart rate control medications as ordered  - Monitor electrolytes and administer replacement therapy as ordered  Outcome: Progressing     Problem: GASTROINTESTINAL - ADULT  Goal: Minimal or absence of nausea and/or vomiting  Description: INTERVENTIONS:  - Administer IV fluids if ordered to ensure adequate hydration  - Maintain NPO status until nausea and vomiting are resolved  - Nasogastric tube if ordered  - Administer ordered antiemetic medications as needed  - Provide nonpharmacologic comfort measures as appropriate  - Advance diet as tolerated, if ordered  - Consider nutrition services referral to assist patient with adequate nutrition and appropriate food choices  Outcome: Progressing  Goal: Maintains or returns to baseline bowel function  Description: INTERVENTIONS:  - Assess bowel function  - Encourage oral fluids to ensure adequate hydration  - Administer IV fluids if ordered to ensure adequate hydration  - Administer ordered medications as needed  - Encourage mobilization and activity  - Consider nutritional services referral to assist patient with  adequate nutrition and appropriate food choices  Outcome: Progressing  Goal: Maintains adequate nutritional intake  Description: INTERVENTIONS:  - Monitor percentage of each meal consumed  - Identify factors contributing to decreased intake, treat as appropriate  - Assist with meals as needed  - Monitor I&O, weight, and lab values if indicated  - Obtain nutrition services referral as needed  Outcome: Progressing  Goal: Establish and maintain optimal ostomy function  Description: INTERVENTIONS:  - Assess bowel function  - Encourage oral fluids to ensure adequate hydration  - Administer IV fluids if ordered to ensure adequate hydration   - Administer ordered medications as needed  - Encourage mobilization and activity  - Nutrition services referral to assist patient with appropriate food choices  - Assess stoma site  - Consider wound care consult   Outcome: Progressing  Goal: Oral mucous membranes remain intact  Description: INTERVENTIONS  - Assess oral mucosa and hygiene practices  - Implement preventative oral hygiene regimen  - Implement oral medicated treatments as ordered  - Initiate Nutrition services referral as needed  Outcome: Progressing     Problem: METABOLIC, FLUID AND ELECTROLYTES - ADULT  Goal: Electrolytes maintained within normal limits  Description: INTERVENTIONS:  - Monitor labs and assess patient for signs and symptoms of electrolyte imbalances  - Administer electrolyte replacement as ordered  - Monitor response to electrolyte replacements, including repeat lab results as appropriate  - Instruct patient on fluid and nutrition as appropriate  Outcome: Progressing  Goal: Fluid balance maintained  Description: INTERVENTIONS:  - Monitor labs   - Monitor I/O and WT  - Instruct patient on fluid and nutrition as appropriate  - Assess for signs & symptoms of volume excess or deficit  Outcome: Progressing  Goal: Glucose maintained within target range  Description: INTERVENTIONS:  - Monitor Blood Glucose as  ordered  - Assess for signs and symptoms of hyperglycemia and hypoglycemia  - Administer ordered medications to maintain glucose within target range  - Assess nutritional intake and initiate nutrition service referral as needed  Outcome: Progressing     Problem: HEMATOLOGIC - ADULT  Goal: Maintains hematologic stability  Description: INTERVENTIONS  - Assess for signs and symptoms of bleeding or hemorrhage  - Monitor labs  - Administer supportive blood products/factors as ordered and appropriate  Outcome: Progressing     Problem: Neurological Deficit  Goal: Neurological status is stable or improving  Description: Interventions:  - Monitor and assess patient's level of consciousness, motor function, sensory function, and level of assistance needed for ADLs.   - Monitor and report changes from baseline. Collaborate with interdisciplinary team to initiate plan and implement interventions as ordered.   - Provide and maintain a safe environment.  - Consider seizure precautions.  - Consider fall precautions.  - Consider aspiration precautions.  - Consider bleeding precautions.  Outcome: Progressing     Problem: Activity Intolerance/Impaired Mobility  Goal: Mobility/activity is maintained at optimum level for patient  Description: Interventions:  - Assess and monitor patient  barriers to mobility and need for assistive/adaptive devices.  - Assess patient's emotional response to limitations.  - Collaborate with interdisciplinary team and initiate plans and interventions as ordered.  - Encourage independent activity per ability.  - Maintain proper body alignment.  - Perform active/passive rom as tolerated/ordered.  - Plan activities to conserve energy.  - Turn patient as appropriate  Outcome: Progressing     Problem: Potential for Aspiration  Goal: Non-ventilated patient's risk of aspiration is minimized  Description: Assess and monitor vital signs, respiratory status, and labs (WBC).  Monitor for signs of aspiration  (tachypnea, cough, rales, wheezing, cyanosis, fever).    - Assess and monitor patient's ability to swallow.  - Place patient up in chair to eat if possible.  - HOB up at 90 degrees to eat if unable to get patient up into chair.  - Supervise patient during oral intake.   - Instruct patient/ family to take small bites.  - Instruct patient/ family to take small single sips when taking liquids.  - Follow patient-specific strategies generated by speech pathologist.  Outcome: Progressing

## 2024-05-14 NOTE — PROGRESS NOTES
Pastoral Care Progress Note    2024  Patient: Agatha Villeda : 1945  Admission Date & Time: 5/10/2024 09  MRN: [id number] CSN: [id number]    Fr Garnica provided prayer at pt request.

## 2024-05-14 NOTE — SPEECH THERAPY NOTE
Speech Language/Pathology    Speech/Language Pathology Progress Note    Patient Name: Agatha Villeda  Today's Date: 5/14/2024      Assessment:  Pt seen w/ lunch meal consisting of dental soft deli sandwich and mixed consistency chicken noodle soup. Pt reports no difficulty swallowing at this time, globus sensation has improved. Plan for EGD on Thursday w/ GI d/t anemia. Denies coughing w/ intake.   Adequate mastication and clearance noted. No overt s/s aspiration.    Plan/Recommendations:  Recommend to continue current diet  SLP will s/o    Marizol Cardenas MS CCC-SLP  5/14/2024

## 2024-05-14 NOTE — ASSESSMENT & PLAN NOTE
Hemoglobin noted to be 6.7 on admission, dropped to 5.8  Received 2 units packed red blood cells with appropriate compensation  Hemoglobin 10.9 this AM  Noted to have severe iron deficiency anemia, on IV Venofer  GI consult appreciated - plan for EGD/colonoscopy Thursday 5/16/24  On aspirin/Plavix per Neurology recs as above

## 2024-05-14 NOTE — PLAN OF CARE
Problem: PAIN - ADULT  Goal: Verbalizes/displays adequate comfort level or baseline comfort level  Description: Interventions:  - Encourage patient to monitor pain and request assistance  - Assess pain using appropriate pain scale  - Administer analgesics based on type and severity of pain and evaluate response  - Implement non-pharmacological measures as appropriate and evaluate response  - Consider cultural and social influences on pain and pain management  - Notify physician/advanced practitioner if interventions unsuccessful or patient reports new pain  Outcome: Progressing     Problem: INFECTION - ADULT  Goal: Absence or prevention of progression during hospitalization  Description: INTERVENTIONS:  - Assess and monitor for signs and symptoms of infection  - Monitor lab/diagnostic results  - Monitor all insertion sites, i.e. indwelling lines, tubes, and drains  - Monitor endotracheal if appropriate and nasal secretions for changes in amount and color  - [place] appropriate cooling/warming therapies per order  - Administer medications as ordered  - Instruct and encourage patient and family to use good hand hygiene technique  - Identify and instruct in appropriate isolation precautions for identified infection/condition  Outcome: Progressing  Goal: Absence of fever/infection during neutropenic period  Description: INTERVENTIONS:  - Monitor WBC    Outcome: Progressing     Problem: SAFETY ADULT  Goal: Patient will remain free of falls  Description: INTERVENTIONS:  - Educate patient/family on patient safety including physical limitations  - Instruct patient to call for assistance with activity   - Consult OT/PT to assist with strengthening/mobility   - Keep Call bell within reach  - Keep bed low and locked with side rails adjusted as appropriate  - Keep care items and personal belongings within reach  - Initiate and maintain comfort rounds  - Make Fall Risk Sign visible to staff  - Offer Toileting every 2 Hours,  in advance of need  - Initiate/Maintain bed/chair alarm  - Obtain necessary fall risk management equipment: yes  - Apply yellow socks and bracelet for high fall risk patients  - Consider moving patient to room near nurses station  Outcome: Progressing  Goal: Maintain or return to baseline ADL function  Description: INTERVENTIONS:  -  Assess patient's ability to carry out ADLs; assess patient's baseline for ADL function and identify physical deficits which impact ability to perform ADLs (bathing, care of mouth/teeth, toileting, grooming, dressing, etc.)  - Assess/evaluate cause of self-care deficits   - Assess range of motion  - Assess patient's mobility; develop plan if impaired  - Assess patient's need for assistive devices and provide as appropriate  - Encourage maximum independence but intervene and supervise when necessary  - Involve family in performance of ADLs  - Assess for home care needs following discharge   - Consider OT consult to assist with ADL evaluation and planning for discharge  - Provide patient education as appropriate  Outcome: Progressing  Goal: Maintains/Returns to pre admission functional level  Description: INTERVENTIONS:  - Perform AM-PAC 6 Click Basic Mobility/ Daily Activity assessment daily.  - Set and communicate daily mobility goal to care team and patient/family/caregiver.   - Collaborate with rehabilitation services on mobility goals if consulted  - Perform Range of Motion 3 times a day.  - Reposition patient every 2 hours.  - Dangle patient 3 times a day  - Stand patient 3 times a day  - Ambulate patient 3 times a day  - Out of bed to chair 3 times a day   - Out of bed for meals 3 times a day  - Out of bed for toileting  - Record patient progress and toleration of activity level   Outcome: Progressing     Problem: DISCHARGE PLANNING  Goal: Discharge to home or other facility with appropriate resources  Description: INTERVENTIONS:  - Identify barriers to discharge w/patient and  caregiver  - Arrange for needed discharge resources and transportation as appropriate  - Identify discharge learning needs (meds, wound care, etc.)  - Arrange for interpretive services to assist at discharge as needed  - Refer to Case Management Department for coordinating discharge planning if the patient needs post-hospital services based on physician/advanced practitioner order or complex needs related to functional status, cognitive ability, or social support system  Outcome: Progressing     Problem: Knowledge Deficit  Goal: Patient/family/caregiver demonstrates understanding of disease process, treatment plan, medications, and discharge instructions  Description: Complete learning assessment and assess knowledge base.  Interventions:  - Provide teaching at level of understanding  - Provide teaching via preferred learning methods  Outcome: Progressing     Problem: Nutrition/Hydration-ADULT  Goal: Nutrient/Hydration intake appropriate for improving, restoring or maintaining nutritional needs  Description: Monitor and assess patient's nutrition/hydration status for malnutrition. Collaborate with interdisciplinary team and initiate plan and interventions as ordered.  Monitor patient's weight and dietary intake as ordered or per policy. Utilize nutrition screening tool and intervene as necessary. Determine patient's food preferences and provide high-protein, high-caloric foods as appropriate.     INTERVENTIONS:  - Monitor oral intake, urinary output, labs, and treatment plans  - Assess nutrition and hydration status and recommend course of action  - Evaluate amount of meals eaten  - Assist patient with eating if necessary   - Allow adequate time for meals  - Recommend/ encourage appropriate diets, oral nutritional supplements, and vitamin/mineral supplements  - Order, calculate, and assess calorie counts as needed  - Recommend, monitor, and adjust tube feedings and TPN/PPN based on assessed needs  - Assess need for  intravenous fluids  - Provide specific nutrition/hydration education as appropriate  - Include patient/family/caregiver in decisions related to nutrition  Outcome: Progressing     Problem: CARDIOVASCULAR - ADULT  Goal: Maintains optimal cardiac output and hemodynamic stability  Description: INTERVENTIONS:  - Monitor I/O, vital signs and rhythm  - Monitor for S/S and trends of decreased cardiac output  - Administer and titrate ordered vasoactive medications to optimize hemodynamic stability  - Assess quality of pulses, skin color and temperature  - Assess for signs of decreased coronary artery perfusion  - Instruct patient to report change in severity of symptoms  Outcome: Progressing  Goal: Absence of cardiac dysrhythmias or at baseline rhythm  Description: INTERVENTIONS:  - Continuous cardiac monitoring, vital signs, obtain 12 lead EKG if ordered  - Administer antiarrhythmic and heart rate control medications as ordered  - Monitor electrolytes and administer replacement therapy as ordered  Outcome: Progressing     Problem: GASTROINTESTINAL - ADULT  Goal: Minimal or absence of nausea and/or vomiting  Description: INTERVENTIONS:  - Administer IV fluids if ordered to ensure adequate hydration  - Maintain NPO status until nausea and vomiting are resolved  - Nasogastric tube if ordered  - Administer ordered antiemetic medications as needed  - Provide nonpharmacologic comfort measures as appropriate  - Advance diet as tolerated, if ordered  - Consider nutrition services referral to assist patient with adequate nutrition and appropriate food choices  Outcome: Progressing  Goal: Maintains or returns to baseline bowel function  Description: INTERVENTIONS:  - Assess bowel function  - Encourage oral fluids to ensure adequate hydration  - Administer IV fluids if ordered to ensure adequate hydration  - Administer ordered medications as needed  - Encourage mobilization and activity  - Consider nutritional services referral to  assist patient with adequate nutrition and appropriate food choices  Outcome: Progressing  Goal: Maintains adequate nutritional intake  Description: INTERVENTIONS:  - Monitor percentage of each meal consumed  - Identify factors contributing to decreased intake, treat as appropriate  - Assist with meals as needed  - Monitor I&O, weight, and lab values if indicated  - Obtain nutrition services referral as needed  Outcome: Progressing  Goal: Establish and maintain optimal ostomy function  Description: INTERVENTIONS:  - Assess bowel function  - Encourage oral fluids to ensure adequate hydration  - Administer IV fluids if ordered to ensure adequate hydration   - Administer ordered medications as needed  - Encourage mobilization and activity  - Nutrition services referral to assist patient with appropriate food choices  - Assess stoma site  - Consider wound care consult   Outcome: Progressing  Goal: Oral mucous membranes remain intact  Description: INTERVENTIONS  - Assess oral mucosa and hygiene practices  - Implement preventative oral hygiene regimen  - Implement oral medicated treatments as ordered  - Initiate Nutrition services referral as needed  Outcome: Progressing     Problem: METABOLIC, FLUID AND ELECTROLYTES - ADULT  Goal: Electrolytes maintained within normal limits  Description: INTERVENTIONS:  - Monitor labs and assess patient for signs and symptoms of electrolyte imbalances  - Administer electrolyte replacement as ordered  - Monitor response to electrolyte replacements, including repeat lab results as appropriate  - Instruct patient on fluid and nutrition as appropriate  Outcome: Progressing  Goal: Fluid balance maintained  Description: INTERVENTIONS:  - Monitor labs   - Monitor I/O and WT  - Instruct patient on fluid and nutrition as appropriate  - Assess for signs & symptoms of volume excess or deficit  Outcome: Progressing  Goal: Glucose maintained within target range  Description: INTERVENTIONS:  -  Monitor Blood Glucose as ordered  - Assess for signs and symptoms of hyperglycemia and hypoglycemia  - Administer ordered medications to maintain glucose within target range  - Assess nutritional intake and initiate nutrition service referral as needed  Outcome: Progressing     Problem: HEMATOLOGIC - ADULT  Goal: Maintains hematologic stability  Description: INTERVENTIONS  - Assess for signs and symptoms of bleeding or hemorrhage  - Monitor labs  - Administer supportive blood products/factors as ordered and appropriate  Outcome: Progressing     Problem: Neurological Deficit  Goal: Neurological status is stable or improving  Description: Interventions:  - Monitor and assess patient's level of consciousness, motor function, sensory function, and level of assistance needed for ADLs.   - Monitor and report changes from baseline. Collaborate with interdisciplinary team to initiate plan and implement interventions as ordered.   - Provide and maintain a safe environment.  - Consider seizure precautions.  - Consider fall precautions.  - Consider aspiration precautions.  - Consider bleeding precautions.  Outcome: Progressing     Problem: Activity Intolerance/Impaired Mobility  Goal: Mobility/activity is maintained at optimum level for patient  Description: Interventions:  - Assess and monitor patient  barriers to mobility and need for assistive/adaptive devices.  - Assess patient's emotional response to limitations.  - Collaborate with interdisciplinary team and initiate plans and interventions as ordered.  - Encourage independent activity per ability.  - Maintain proper body alignment.  - Perform active/passive rom as tolerated/ordered.  - Plan activities to conserve energy.  - Turn patient as appropriate  Outcome: Progressing     Problem: Potential for Aspiration  Goal: Non-ventilated patient's risk of aspiration is minimized  Description: Assess and monitor vital signs, respiratory status, and labs (WBC).  Monitor for  signs of aspiration (tachypnea, cough, rales, wheezing, cyanosis, fever).    - Assess and monitor patient's ability to swallow.  - Place patient up in chair to eat if possible.  - HOB up at 90 degrees to eat if unable to get patient up into chair.  - Supervise patient during oral intake.   - Instruct patient/ family to take small bites.  - Instruct patient/ family to take small single sips when taking liquids.  - Follow patient-specific strategies generated by speech pathologist.  Outcome: Progressing

## 2024-05-14 NOTE — ASSESSMENT & PLAN NOTE
"Presented with R leg weakness that began 3 days prior to admission   CTA head/neck revealed:  \"CT head: -Hypodensities within the left parietal lobe and left cerebellar hemisphere may represent age-indeterminate infarct. Recommend MRI of the brain for further evaluation. -Chronic microangiopathic changes.  CTA head: -No large vessel occlusion. Question moderate to severe right and moderate left supraclinoid stenosis, possibly exaggerated due to motion. -Focal moderate to severe stenosis involving the proximal right P2 segment of the PCA.  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 - Approximately 50% left and less than 50% right stenosis of the proximal internal carotid arteries due to atherosclerosis.  Other: -Spondylosis of the cervical spine including grade 1 listhesis as above. -Mediastinal and bilateral hilar adenopathy, nonspecific. Clinical correlation and dedicated chest CT advised.\"  MRI revealed, \"1.  Multifocal acute/subacute infarcts involving bilateral frontoparietal and occipital lobes as well as left cerebellum suggesting embolic etiology. There is focal petechial hemorrhage within the left parietal lobe infarct. No mass effect. 2.  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 Chronic microangiopathic change. 3.  Right mastoid effusion. Correlate for mastoiditis.\"  Neurology consult appreciated - on aspirin 81mg daily, Plavix 75mg daily, Lipitor 40mg daily. Will need Loop recorder as outpatient   PT/OT  "

## 2024-05-14 NOTE — CONSULTS
"Consultation - [facility]  Agatha Villeda 79 y.o. female MRN: [id number]  Unit/Bed#: -01 Encounter: [id number]      ASSESSMENT & PLAN    Iron deficiency anemia  Acute CVA on ASA and Plavix  Patient admitted with right leg weakness and found to have an acute CVA  Incidentally noted to have severe anemia with Hgb 5.8  Iron studies consistent with iron deficiency, iron 12, ferritin 19, iron sat 2, TIBC 682  S/p 2U pRBCs and IV venofer with improvement to 9.2  Stable at 10.9 this AM   No overt bleeding   No prior endoscopic evaluation   Will plan for EGD/colonoscopy on Thursday, 5/16  Will be diagnostic only as patient is on Plavix for recent CVA  Clear liquid diet tomorrow with bowel prep tomorrow evening   Continue to monitor Hgb and transfuse <7      Reason for Consult / Principal Problem: acute anemia    HPI: Agatha Villeda is a 79 y.o. year old female with no significant PMHx who presented with right leg weakness x3 days.     In the ED, labs notable for Hgb 5.8, platelets 779. CT head with age-indeterminate infarct in left parietal lobe and left cerebellar hemisphere. She was admitted with right leg weakness and acute anemia.     Brain MRI revealed \"multifocal acute/subacute infarcts involving bilateral frontoparietal and occipital lobes as well as left cerebellum suggesting embolic etiology\". Neurology was consulted. Patient started on aspirin and Plavix.    GI asked to see due to severe JACLYN of unknown etiology.    Patient seen at bedside this AM. Resting comfortably in chair. Denies any overt GI bleeding. No abdominal pain, N/V. No Fhx of CRC. Brother with esophageal cancer. Hx of GERD on nexium daily which works well for her. No prior EGD or colonoscopy.     Endoscopic risk assessment:  Anticoagulation use: Plavix  Diabetes medication: no  CVS history: CVA 5/2024  Abdominal surgeries: no  Sleep apnea: no  O2 use: no    Past Medical History:   Diagnosis Date    Cataracts, " bilateral      Past Surgical History:   Procedure Laterality Date    CATARACT EXTRACTION Bilateral      Social History   Social History     Substance and Sexual Activity   Alcohol Use Never     Social History     Substance and Sexual Activity   Drug Use Never     Social History     Tobacco Use   Smoking Status Never   Smokeless Tobacco Never       No family history on file.    No medications prior to admission.     Current Facility-Administered Medications   Medication Dose Route Frequency    acetaminophen (TYLENOL) tablet 650 mg  650 mg Oral Q4H PRN    amLODIPine (NORVASC) tablet 5 mg  5 mg Oral Daily    aspirin chewable tablet 81 mg  81 mg Oral Daily    atorvastatin (LIPITOR) tablet 40 mg  40 mg Oral QPM    clopidogrel (PLAVIX) tablet 75 mg  75 mg Oral Daily    heparin (porcine) subcutaneous injection 5,000 Units  5,000 Units Subcutaneous Q8H LOS    iron sucrose (VENOFER) 200 mg in sodium chloride 0.9 % 100 mL IVPB  200 mg Intravenous Daily    polyethylene glycol (MIRALAX) packet 17 g  17 g Oral Daily PRN     No Known Allergies    Physical Exam  Constitutional:       General: She is not in acute distress.     Appearance: She is not ill-appearing.   HENT:      Head: Normocephalic and atraumatic.      Mouth/Throat:      Mouth: Mucous membranes are moist.   Eyes:      General: No scleral icterus.  Pulmonary:      Effort: Pulmonary effort is normal. No respiratory distress.   Abdominal:      General: Abdomen is flat. There is no distension.      Palpations: Abdomen is soft.      Tenderness: There is no abdominal tenderness. There is no guarding.   Skin:     General: Skin is warm and dry.      Coloration: Skin is not jaundiced.   Neurological:      Mental Status: She is alert.       Most Recent Vital Signs:  Vitals:    05/13/24 1645 05/13/24 2045 05/14/24 0045 05/14/24 0717   BP: 142/78 143/83 126/74 131/84   BP Location: Right arm Left arm Left arm Left arm   Pulse: 96 96 85 85   Resp: 17 19 16 18   Temp: 97.9 °F  (36.6 °C) 97.5 °F (36.4 °C) 97.5 °F (36.4 °C) (!) 96 °F (35.6 °C)   TempSrc:  Temporal Temporal Temporal   SpO2: 96% 96% 96% 95%   Weight:       Height:           Intake/Output Summary (Last 24 hours) at 5/14/2024 0844  Last data filed at 5/14/2024 0842  Gross per 24 hour   Intake 480 ml   Output 1025 ml   Net -545 ml       LABS/IMAGING  Lab Results: I have reviewed all relevant lab results during this hospitalization.    Imaging Studies:  I have reviewed all the relevant images during this hospitalizations    Counseling / Coordination of Care  Total time spent today 45 minutes. Greater than 50% of total time was spent with the patient and / or family counseling and / or coordination of care.    Judy Matthews PA-C

## 2024-05-14 NOTE — NURSING NOTE
Assumed pt care at 1445. Pt resting comfortably in bed. Pt is not exhibiting any signs of distress or discomfort. VSS. Pt breathing is normal and symmetrical while at rest. Plan of care updated.

## 2024-05-14 NOTE — CASE MANAGEMENT
Case Management Discharge Planning Note    Patient name Agatha Villeda  Location /-01 MRN [id number]  : 1945 Date 2024       Current Admission Date: 5/10/2024  Current Admission Diagnosis:Acute CVA (cerebrovascular accident) (HCC)   Patient Active Problem List    Diagnosis Date Noted    Acute CVA (cerebrovascular accident) (HCC) 05/10/2024    Acute anemia 05/10/2024      LOS (days): 4  Geometric Mean LOS (GMLOS) (days): 1.9  Days to GMLOS:-2.2     OBJECTIVE:  Risk of Unplanned Readmission Score: 9.68         Current admission status: Inpatient   Preferred Pharmacy:   [id number] - [place], [place] - [address] [address]  Phone: [phone] Fax: [phone]    Primary Care Provider: Nasrin Garcia MD    Primary Insurance: MEDICARE  Secondary Insurance: BLUE CROSS    DISCHARGE DETAILS:     Followed up with patient and granddaughter, Birgit.  They are planning on borrowing a walker/ Rolator and do not need one order for patient upon dc.  DC plan is to go for OP Therapy at [facility].      Case was discussed in multi disciplinary rounds. Attending the rounds were the provider, Nursing, Care Management, and therapy ( Pt/OT and Speech).   Plan is EGD/ Colonscopy on Thursday related to hgb being 5 on admission.  CM will continue to follow.

## 2024-05-14 NOTE — PLAN OF CARE
Problem: PAIN - ADULT  Goal: Verbalizes/displays adequate comfort level or baseline comfort level  Description: Interventions:  - Encourage patient to monitor pain and request assistance  - Assess pain using appropriate pain scale  - Administer analgesics based on type and severity of pain and evaluate response  - Implement non-pharmacological measures as appropriate and evaluate response  - Consider cultural and social influences on pain and pain management  - Notify physician/advanced practitioner if interventions unsuccessful or patient reports new pain  Outcome: Progressing     Problem: INFECTION - ADULT  Goal: Absence or prevention of progression during hospitalization  Description: INTERVENTIONS:  - Assess and monitor for signs and symptoms of infection  - Monitor lab/diagnostic results  - Monitor all insertion sites, i.e. indwelling lines, tubes, and drains  - Monitor endotracheal if appropriate and nasal secretions for changes in amount and color  - [place] appropriate cooling/warming therapies per order  - Administer medications as ordered  - Instruct and encourage patient and family to use good hand hygiene technique  - Identify and instruct in appropriate isolation precautions for identified infection/condition  Outcome: Progressing  Goal: Absence of fever/infection during neutropenic period  Description: INTERVENTIONS:  - Monitor WBC    Outcome: Progressing     Problem: SAFETY ADULT  Goal: Patient will remain free of falls  Description: INTERVENTIONS:  - Educate patient/family on patient safety including physical limitations  - Instruct patient to call for assistance with activity   - Consult OT/PT to assist with strengthening/mobility   - Keep Call bell within reach  - Keep bed low and locked with side rails adjusted as appropriate  - Keep care items and personal belongings within reach  - Initiate and maintain comfort rounds  - Make Fall Risk Sign visible to staff  - Offer Toileting every 2 Hours,  in advance of need  - Initiate/Maintain bed alarm  - Obtain necessary fall risk management equipment  - Apply yellow socks and bracelet for high fall risk patients  - Consider moving patient to room near nurses station  Outcome: Progressing  Goal: Maintain or return to baseline ADL function  Description: INTERVENTIONS:  -  Assess patient's ability to carry out ADLs; assess patient's baseline for ADL function and identify physical deficits which impact ability to perform ADLs (bathing, care of mouth/teeth, toileting, grooming, dressing, etc.)  - Assess/evaluate cause of self-care deficits   - Assess range of motion  - Assess patient's mobility; develop plan if impaired  - Assess patient's need for assistive devices and provide as appropriate  - Encourage maximum independence but intervene and supervise when necessary  - Involve family in performance of ADLs  - Assess for home care needs following discharge   - Consider OT consult to assist with ADL evaluation and planning for discharge  - Provide patient education as appropriate  Outcome: Progressing  Goal: Maintains/Returns to pre admission functional level  Description: INTERVENTIONS:  - Perform AM-PAC 6 Click Basic Mobility/ Daily Activity assessment daily.  - Set and communicate daily mobility goal to care team and patient/family/caregiver.   - Collaborate with rehabilitation services on mobility goals if consulted  - Perform Range of Motion 3 times a day.  - Reposition patient every 2 hours.  - Dangle patient 3 times a day  - Stand patient 3 times a day  - Ambulate patient 3 times a day  - Out of bed to chair 3 times a day   - Out of bed for meals 3 times a day  - Out of bed for toileting  - Record patient progress and toleration of activity level   Outcome: Progressing     Problem: DISCHARGE PLANNING  Goal: Discharge to home or other facility with appropriate resources  Description: INTERVENTIONS:  - Identify barriers to discharge w/patient and caregiver  -  Arrange for needed discharge resources and transportation as appropriate  - Identify discharge learning needs (meds, wound care, etc.)  - Arrange for interpretive services to assist at discharge as needed  - Refer to Case Management Department for coordinating discharge planning if the patient needs post-hospital services based on physician/advanced practitioner order or complex needs related to functional status, cognitive ability, or social support system  Outcome: Progressing     Problem: Knowledge Deficit  Goal: Patient/family/caregiver demonstrates understanding of disease process, treatment plan, medications, and discharge instructions  Description: Complete learning assessment and assess knowledge base.  Interventions:  - Provide teaching at level of understanding  - Provide teaching via preferred learning methods  Outcome: Progressing     Problem: Nutrition/Hydration-ADULT  Goal: Nutrient/Hydration intake appropriate for improving, restoring or maintaining nutritional needs  Description: Monitor and assess patient's nutrition/hydration status for malnutrition. Collaborate with interdisciplinary team and initiate plan and interventions as ordered.  Monitor patient's weight and dietary intake as ordered or per policy. Utilize nutrition screening tool and intervene as necessary. Determine patient's food preferences and provide high-protein, high-caloric foods as appropriate.     INTERVENTIONS:  - Monitor oral intake, urinary output, labs, and treatment plans  - Assess nutrition and hydration status and recommend course of action  - Evaluate amount of meals eaten  - Assist patient with eating if necessary   - Allow adequate time for meals  - Recommend/ encourage appropriate diets, oral nutritional supplements, and vitamin/mineral supplements  - Order, calculate, and assess calorie counts as needed  - Recommend, monitor, and adjust tube feedings and TPN/PPN based on assessed needs  - Assess need for intravenous  fluids  - Provide specific nutrition/hydration education as appropriate  - Include patient/family/caregiver in decisions related to nutrition  Outcome: Progressing     Problem: CARDIOVASCULAR - ADULT  Goal: Maintains optimal cardiac output and hemodynamic stability  Description: INTERVENTIONS:  - Monitor I/O, vital signs and rhythm  - Monitor for S/S and trends of decreased cardiac output  - Administer and titrate ordered vasoactive medications to optimize hemodynamic stability  - Assess quality of pulses, skin color and temperature  - Assess for signs of decreased coronary artery perfusion  - Instruct patient to report change in severity of symptoms  Outcome: Progressing  Goal: Absence of cardiac dysrhythmias or at baseline rhythm  Description: INTERVENTIONS:  - Continuous cardiac monitoring, vital signs, obtain 12 lead EKG if ordered  - Administer antiarrhythmic and heart rate control medications as ordered  - Monitor electrolytes and administer replacement therapy as ordered  Outcome: Progressing     Problem: GASTROINTESTINAL - ADULT  Goal: Minimal or absence of nausea and/or vomiting  Description: INTERVENTIONS:  - Administer IV fluids if ordered to ensure adequate hydration  - Maintain NPO status until nausea and vomiting are resolved  - Nasogastric tube if ordered  - Administer ordered antiemetic medications as needed  - Provide nonpharmacologic comfort measures as appropriate  - Advance diet as tolerated, if ordered  - Consider nutrition services referral to assist patient with adequate nutrition and appropriate food choices  Outcome: Progressing  Goal: Maintains or returns to baseline bowel function  Description: INTERVENTIONS:  - Assess bowel function  - Encourage oral fluids to ensure adequate hydration  - Administer IV fluids if ordered to ensure adequate hydration  - Administer ordered medications as needed  - Encourage mobilization and activity  - Consider nutritional services referral to assist patient  with adequate nutrition and appropriate food choices  Outcome: Progressing  Goal: Maintains adequate nutritional intake  Description: INTERVENTIONS:  - Monitor percentage of each meal consumed  - Identify factors contributing to decreased intake, treat as appropriate  - Assist with meals as needed  - Monitor I&O, weight, and lab values if indicated  - Obtain nutrition services referral as needed  Outcome: Progressing  Goal: Establish and maintain optimal ostomy function  Description: INTERVENTIONS:  - Assess bowel function  - Encourage oral fluids to ensure adequate hydration  - Administer IV fluids if ordered to ensure adequate hydration   - Administer ordered medications as needed  - Encourage mobilization and activity  - Nutrition services referral to assist patient with appropriate food choices  - Assess stoma site  - Consider wound care consult   Outcome: Progressing  Goal: Oral mucous membranes remain intact  Description: INTERVENTIONS  - Assess oral mucosa and hygiene practices  - Implement preventative oral hygiene regimen  - Implement oral medicated treatments as ordered  - Initiate Nutrition services referral as needed  Outcome: Progressing     Problem: METABOLIC, FLUID AND ELECTROLYTES - ADULT  Goal: Electrolytes maintained within normal limits  Description: INTERVENTIONS:  - Monitor labs and assess patient for signs and symptoms of electrolyte imbalances  - Administer electrolyte replacement as ordered  - Monitor response to electrolyte replacements, including repeat lab results as appropriate  - Instruct patient on fluid and nutrition as appropriate  Outcome: Progressing  Goal: Fluid balance maintained  Description: INTERVENTIONS:  - Monitor labs   - Monitor I/O and WT  - Instruct patient on fluid and nutrition as appropriate  - Assess for signs & symptoms of volume excess or deficit  Outcome: Progressing  Goal: Glucose maintained within target range  Description: INTERVENTIONS:  - Monitor Blood Glucose  as ordered  - Assess for signs and symptoms of hyperglycemia and hypoglycemia  - Administer ordered medications to maintain glucose within target range  - Assess nutritional intake and initiate nutrition service referral as needed  Outcome: Progressing     Problem: HEMATOLOGIC - ADULT  Goal: Maintains hematologic stability  Description: INTERVENTIONS  - Assess for signs and symptoms of bleeding or hemorrhage  - Monitor labs  - Administer supportive blood products/factors as ordered and appropriate  Outcome: Progressing     Problem: Neurological Deficit  Goal: Neurological status is stable or improving  Description: Interventions:  - Monitor and assess patient's level of consciousness, motor function, sensory function, and level of assistance needed for ADLs.   - Monitor and report changes from baseline. Collaborate with interdisciplinary team to initiate plan and implement interventions as ordered.   - Provide and maintain a safe environment.  - Consider seizure precautions.  - Consider fall precautions.  - Consider aspiration precautions.  - Consider bleeding precautions.  Outcome: Progressing     Problem: Activity Intolerance/Impaired Mobility  Goal: Mobility/activity is maintained at optimum level for patient  Description: Interventions:  - Assess and monitor patient  barriers to mobility and need for assistive/adaptive devices.  - Assess patient's emotional response to limitations.  - Collaborate with interdisciplinary team and initiate plans and interventions as ordered.  - Encourage independent activity per ability.  - Maintain proper body alignment.  - Perform active/passive rom as tolerated/ordered.  - Plan activities to conserve energy.  - Turn patient as appropriate  Outcome: Progressing     Problem: Potential for Aspiration  Goal: Non-ventilated patient's risk of aspiration is minimized  Description: Assess and monitor vital signs, respiratory status, and labs (WBC).  Monitor for signs of aspiration  (tachypnea, cough, rales, wheezing, cyanosis, fever).    - Assess and monitor patient's ability to swallow.  - Place patient up in chair to eat if possible.  - HOB up at 90 degrees to eat if unable to get patient up into chair.  - Supervise patient during oral intake.   - Instruct patient/ family to take small bites.  - Instruct patient/ family to take small single sips when taking liquids.  - Follow patient-specific strategies generated by speech pathologist.  Outcome: Progressing

## 2024-05-14 NOTE — PROGRESS NOTES
"[facility]  Progress Note  Name: Agatha Villeda I  MRN: [id number]  Unit/Bed#: -01 I Date of Admission: 5/10/2024   Date of Service: 5/14/2024 I Hospital Day: 4    Assessment/Plan   * Acute CVA (cerebrovascular accident) (HCC)  Assessment & Plan  Presented with R leg weakness that began 3 days prior to admission   CTA head/neck revealed:  \"CT head: -Hypodensities within the left parietal lobe and left cerebellar hemisphere may represent age-indeterminate infarct. Recommend MRI of the brain for further evaluation. -Chronic microangiopathic changes.  CTA head: -No large vessel occlusion. Question moderate to severe right and moderate left supraclinoid stenosis, possibly exaggerated due to motion. -Focal moderate to severe stenosis involving the proximal right P2 segment of the PCA.  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 - Approximately 50% left and less than 50% right stenosis of the proximal internal carotid arteries due to atherosclerosis.  Other: -Spondylosis of the cervical spine including grade 1 listhesis as above. -Mediastinal and bilateral hilar adenopathy, nonspecific. Clinical correlation and dedicated chest CT advised.\"  MRI revealed, \"1.  Multifocal acute/subacute infarcts involving bilateral frontoparietal and occipital lobes as well as left cerebellum suggesting embolic etiology. There is focal petechial hemorrhage within the left parietal lobe infarct. No mass effect. 2.  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 Chronic microangiopathic change. 3.  Right mastoid effusion. Correlate for mastoiditis.\"  Neurology consult appreciated - on aspirin 81mg daily, Plavix 75mg daily, Lipitor 40mg daily. Will need Loop recorder as outpatient   PT/OT    Acute anemia  Assessment & Plan  Hemoglobin noted to be 6.7 on admission, dropped to 5.8  Received 2 units packed red blood cells with appropriate compensation  Hemoglobin 10.9 this AM  Noted to have " severe iron deficiency anemia, on IV Venofer  GI consult appreciated - plan for EGD/colonoscopy 24  On aspirin/Plavix per Neurology recs as above           VTE Pharmacologic Prophylaxis: VTE Score: 6 High Risk (Score >/= 5) - Pharmacological DVT Prophylaxis Ordered: heparin. Sequential Compression Devices Ordered.    Mobility:   Basic Mobility Inpatient Raw Score: 23  JH-HLM Goal: 7: Walk 25 feet or more  JH-HLM Achieved: 8: Walk 250 feet ot more  JH-HLM Goal achieved. Continue to encourage appropriate mobility.    Patient Centered Rounds: I performed bedside rounds with nursing staff today.   Discussions with Specialists or Other Care Team Provider: GI and      Education and Discussions with Family / Patient: Attempted to update  (granddaughter, Birgit, per patient's request) via phone. Unable to contact.    Total Time Spent on Date of Encounter in care of patient: 33 mins. This time was spent on one or more of the following: performing physical exam; counseling and coordination of care; obtaining or reviewing history; documenting in the medical record; reviewing/ordering tests, medications or procedures; communicating with other healthcare professionals and discussing with patient's family/caregivers.    Current Length of Stay: 4 day(s)  Current Patient Status: Inpatient   Certification Statement: The patient will continue to require additional inpatient hospital stay due to anemia, EGD/colonoscopy  Discharge Plan: Anticipate discharge in >72 hrs to home with home services.    Code Status: Level 1 - Full Code    Subjective:   Ms. Villeda reports that she wants to get the EGD/colonoscopy done while she's still here. Per GI, they can do it on Thursday. Patient aware. Patient currently without complaint. She reports only 1 BM since being here.     Objective:     Vitals:   Temp (24hrs), Av.3 °F (36.3 °C), Min:96 °F (35.6 °C), Max:97.9 °F (36.6 °C)    Temp:  [96 °F (35.6  °C)-97.9 °F (36.6 °C)] 97.7 °F (36.5 °C)  HR:  [85-98] 94  Resp:  [16-19] 18  BP: (126-143)/(68-84) 138/69  SpO2:  [95 %-98 %] 95 %  Body mass index is 24.2 kg/m².     Input and Output Summary (last 24 hours):     Intake/Output Summary (Last 24 hours) at 5/14/2024 1136  Last data filed at 5/14/2024 0900  Gross per 24 hour   Intake 360 ml   Output 1225 ml   Net -865 ml       Physical Exam:   Physical Exam  Vitals reviewed.   Constitutional:       Comments: Patient seen sitting in bedside chair comfortably resting watching TV, NAD   Cardiovascular:      Rate and Rhythm: Normal rate and regular rhythm.   Pulmonary:      Effort: Pulmonary effort is normal. No respiratory distress.      Breath sounds: Normal breath sounds.   Abdominal:      General: Bowel sounds are normal.      Palpations: Abdomen is soft.      Tenderness: There is no abdominal tenderness.   Musculoskeletal:      Right lower leg: No edema.      Left lower leg: No edema.   Skin:     General: Skin is warm.   Neurological:      Mental Status: She is alert and oriented to person, place, and time.   Psychiatric:         Mood and Affect: Mood normal.         Behavior: Behavior normal.          Additional Data:     Labs:  Results from last 7 days   Lab Units 05/14/24  0448   WBC Thousand/uL 12.68*   HEMOGLOBIN g/dL 10.9*   HEMATOCRIT % 37.2   PLATELETS Thousands/uL 642*   SEGS PCT % 80*   LYMPHO PCT % 11*   MONO PCT % 6   EOS PCT % 2     Results from last 7 days   Lab Units 05/14/24  0448 05/11/24  0436 05/10/24  0959   SODIUM mmol/L 137   < > 138   POTASSIUM mmol/L 3.9   < > 3.8   CHLORIDE mmol/L 104   < > 104   CO2 mmol/L 25   < > 24   BUN mg/dL 14   < > 12   CREATININE mg/dL 0.74   < > 0.93   ANION GAP mmol/L 8   < > 10   CALCIUM mg/dL 9.2   < > 9.4   ALBUMIN g/dL  --   --  4.0   TOTAL BILIRUBIN mg/dL  --   --  0.29   ALK PHOS U/L  --   --  68   ALT U/L  --   --  11   AST U/L  --   --  16   GLUCOSE RANDOM mg/dL 97   < > 126    < > = values in this interval  not displayed.             Results from last 7 days   Lab Units 05/11/24  0436   HEMOGLOBIN A1C % 5.5           Lines/Drains:  Invasive Devices       Peripheral Intravenous Line  Duration             Peripheral IV 05/14/24 Distal;Dorsal (posterior);Left Forearm <1 day                      Telemetry:  Telemetry Orders (From admission, onward)               24 Hour Telemetry Monitoring  Continuous x 24 Hours (Telem)        Question:  Reason for 24 Hour Telemetry  Answer:  TIA/Suspected CVA/ Confirmed CVA                     Telemetry Reviewed: Normal Sinus Rhythm  Indication for Continued Telemetry Use: Acute CVA             Imaging: Reviewed radiology reports from this admission including: CT head and MRI brain    Recent Cultures (last 7 days):         Last 24 Hours Medication List:   Current Facility-Administered Medications   Medication Dose Route Frequency Provider Last Rate    acetaminophen  650 mg Oral Q4H PRN David Dye MD      amLODIPine  5 mg Oral Daily David Dye MD      aspirin  81 mg Oral Daily David Dye MD      atorvastatin  40 mg Oral QPM David Dye MD      [START ON 5/15/2024] bisacodyl  10 mg Oral Once Judy Matthews PA-C      clopidogrel  75 mg Oral Daily David Dye MD      heparin (porcine)  5,000 Units Subcutaneous Q8H [facility] David Dye MD      iron sucrose  200 mg Intravenous Daily David Dye  mg (05/14/24 0959)    [START ON 5/15/2024] polyethylene glycol  238 g Oral Once Judy Matthews PA-C      polyethylene glycol  17 g Oral Daily PRN David Dye MD          Today, Patient Was Seen By: Sterling Infante PA-C    **Please Note: This note may have been constructed using a voice recognition system.**

## 2024-05-15 ENCOUNTER — APPOINTMENT (INPATIENT)
Dept: NON INVASIVE DIAGNOSTICS | Facility: HOSPITAL | Age: 79
DRG: 811 | End: 2024-05-15
Payer: MEDICARE

## 2024-05-15 PROBLEM — L53.9: Status: ACTIVE | Noted: 2024-05-15

## 2024-05-15 LAB
ANION GAP SERPL CALCULATED.3IONS-SCNC: 7 MMOL/L (ref 4–13)
BASOPHILS # BLD AUTO: 0.06 THOUSANDS/ÂΜL (ref 0–0.1)
BASOPHILS NFR BLD AUTO: 1 % (ref 0–1)
BUN SERPL-MCNC: 15 MG/DL (ref 5–25)
CALCIUM SERPL-MCNC: 9.1 MG/DL (ref 8.4–10.2)
CHLORIDE SERPL-SCNC: 105 MMOL/L (ref 96–108)
CO2 SERPL-SCNC: 25 MMOL/L (ref 21–32)
CREAT SERPL-MCNC: 0.69 MG/DL (ref 0.6–1.3)
EOSINOPHIL # BLD AUTO: 0.18 THOUSAND/ÂΜL (ref 0–0.61)
EOSINOPHIL NFR BLD AUTO: 2 % (ref 0–6)
ERYTHROCYTE [DISTWIDTH] IN BLOOD BY AUTOMATED COUNT: 23.2 % (ref 11.6–15.1)
GFR SERPL CREATININE-BSD FRML MDRD: 83 ML/MIN/1.73SQ M
GLUCOSE SERPL-MCNC: 100 MG/DL (ref 65–140)
HCT VFR BLD AUTO: 35 % (ref 34.8–46.1)
HGB BLD-MCNC: 10.3 G/DL (ref 11.5–15.4)
IMM GRANULOCYTES # BLD AUTO: 0.06 THOUSAND/UL (ref 0–0.2)
IMM GRANULOCYTES NFR BLD AUTO: 1 % (ref 0–2)
LYMPHOCYTES # BLD AUTO: 0.99 THOUSANDS/ÂΜL (ref 0.6–4.47)
LYMPHOCYTES NFR BLD AUTO: 8 % (ref 14–44)
MCH RBC QN AUTO: 22.3 PG (ref 26.8–34.3)
MCHC RBC AUTO-ENTMCNC: 29.4 G/DL (ref 31.4–37.4)
MCV RBC AUTO: 76 FL (ref 82–98)
MONOCYTES # BLD AUTO: 0.89 THOUSAND/ÂΜL (ref 0.17–1.22)
MONOCYTES NFR BLD AUTO: 7 % (ref 4–12)
NEUTROPHILS # BLD AUTO: 9.98 THOUSANDS/ÂΜL (ref 1.85–7.62)
NEUTS SEG NFR BLD AUTO: 81 % (ref 43–75)
NRBC BLD AUTO-RTO: 0 /100 WBCS
PLATELET # BLD AUTO: 560 THOUSANDS/UL (ref 149–390)
PMV BLD AUTO: 8.7 FL (ref 8.9–12.7)
POTASSIUM SERPL-SCNC: 3.8 MMOL/L (ref 3.5–5.3)
RBC # BLD AUTO: 4.61 MILLION/UL (ref 3.81–5.12)
SODIUM SERPL-SCNC: 137 MMOL/L (ref 135–147)
WBC # BLD AUTO: 12.16 THOUSAND/UL (ref 4.31–10.16)

## 2024-05-15 PROCEDURE — 80048 BASIC METABOLIC PNL TOTAL CA: CPT | Performed by: INTERNAL MEDICINE

## 2024-05-15 PROCEDURE — 97116 GAIT TRAINING THERAPY: CPT | Performed by: PHYSICAL THERAPIST

## 2024-05-15 PROCEDURE — 85025 COMPLETE CBC W/AUTO DIFF WBC: CPT | Performed by: INTERNAL MEDICINE

## 2024-05-15 PROCEDURE — 97530 THERAPEUTIC ACTIVITIES: CPT

## 2024-05-15 PROCEDURE — 97530 THERAPEUTIC ACTIVITIES: CPT | Performed by: PHYSICAL THERAPIST

## 2024-05-15 PROCEDURE — 93971 EXTREMITY STUDY: CPT

## 2024-05-15 PROCEDURE — 93971 EXTREMITY STUDY: CPT | Performed by: SURGERY

## 2024-05-15 PROCEDURE — 99232 SBSQ HOSP IP/OBS MODERATE 35: CPT | Performed by: PHYSICIAN ASSISTANT

## 2024-05-15 RX ORDER — CEFAZOLIN SODIUM 2 G/50ML
2000 SOLUTION INTRAVENOUS EVERY 8 HOURS
Status: DISCONTINUED | OUTPATIENT
Start: 2024-05-15 | End: 2024-05-16

## 2024-05-15 RX ADMIN — HEPARIN SODIUM 5000 UNITS: 5000 INJECTION INTRAVENOUS; SUBCUTANEOUS at 21:24

## 2024-05-15 RX ADMIN — CLOPIDOGREL 75 MG: 75 TABLET ORAL at 08:06

## 2024-05-15 RX ADMIN — HEPARIN SODIUM 5000 UNITS: 5000 INJECTION INTRAVENOUS; SUBCUTANEOUS at 04:56

## 2024-05-15 RX ADMIN — ATORVASTATIN CALCIUM 40 MG: 40 TABLET, FILM COATED ORAL at 17:41

## 2024-05-15 RX ADMIN — POLYETHYLENE GLYCOL 3350 238 G: 17 POWDER, FOR SOLUTION ORAL at 15:57

## 2024-05-15 RX ADMIN — AMLODIPINE BESYLATE 5 MG: 5 TABLET ORAL at 08:06

## 2024-05-15 RX ADMIN — ASPIRIN 81 MG CHEWABLE TABLET 81 MG: 81 TABLET CHEWABLE at 08:06

## 2024-05-15 RX ADMIN — HEPARIN SODIUM 5000 UNITS: 5000 INJECTION INTRAVENOUS; SUBCUTANEOUS at 13:13

## 2024-05-15 RX ADMIN — CEFAZOLIN SODIUM 2000 MG: 2 SOLUTION INTRAVENOUS at 17:41

## 2024-05-15 RX ADMIN — CEFAZOLIN SODIUM 2000 MG: 2 SOLUTION INTRAVENOUS at 09:53

## 2024-05-15 RX ADMIN — BISACODYL 10 MG: 5 TABLET, COATED ORAL at 15:57

## 2024-05-15 NOTE — PLAN OF CARE
Problem: GASTROINTESTINAL - ADULT  Goal: Minimal or absence of nausea and/or vomiting  Description: INTERVENTIONS:  - Administer IV fluids if ordered to ensure adequate hydration  - Maintain NPO status until nausea and vomiting are resolved  - Nasogastric tube if ordered  - Administer ordered antiemetic medications as needed  - Provide nonpharmacologic comfort measures as appropriate  - Advance diet as tolerated, if ordered  - Consider nutrition services referral to assist patient with adequate nutrition and appropriate food choices  Outcome: Progressing     Problem: METABOLIC, FLUID AND ELECTROLYTES - ADULT  Goal: Electrolytes maintained within normal limits  Description: INTERVENTIONS:  - Monitor labs and assess patient for signs and symptoms of electrolyte imbalances  - Administer electrolyte replacement as ordered  - Monitor response to electrolyte replacements, including repeat lab results as appropriate  - Instruct patient on fluid and nutrition as appropriate  Outcome: Progressing

## 2024-05-15 NOTE — PLAN OF CARE
Problem: PHYSICAL THERAPY ADULT  Goal: Performs mobility at highest level of function for planned discharge setting.  See evaluation for individualized goals.  Description: Treatment/Interventions: ADL retraining, Functional transfer training, LE strengthening/ROM, Elevations, Therapeutic exercise, Endurance training, Patient/family training, Equipment eval/education, Bed mobility, Gait training, Compensatory technique education, Spoke to nursing, Spoke to case management, OT  Equipment Recommended: Walker       See flowsheet documentation for full assessment, interventions and recommendations.  Outcome: Progressing  Note: Prognosis: Good  Problem List: Decreased strength, Decreased endurance, Impaired balance, Decreased mobility  Assessment: Patient was seen today per POC. Overall, pt demonstrated improved mobility and inc tolerance to activity. No pain reported. Required mod I for supine to sit, S sit<>stand, ambulation, and stair navigation. RW properly fit for pt. Pt able to ambulate 420'x2 with RW and S in unit. Dec gait speed with dec foot clearance; no gross LOB noted. Pt able to perform reciprocal stair navigation of 4 steps with bilateral hand rails to simulate home setup. Good tolerance to seated interventions with appropriate fatigue. Pt able to tolerate performing 2x10 sit<>stands without UE support and RW in front for improved safety. Inc fatigue and required seated rest break between sets for improved tolerance. No reports of dizziness t/o session. Will continue to see pt per POC as tolerated. Dec PT frequency 2* pt progress with functional mobility. Based on pt presentation and impaired function, pt would benefit from level III, (minimum resource intensity) at D/C. The patient's AM-PAC Basic Mobility Inpatient Short Form Raw Score is 24. A Raw score of greater than 16 suggests the patient may benefit from discharge to home. Please also refer to the recommendation of the Physical Therapist for safe  discharge planning. Nsg staff to continue to mobilized pt (OOB in chair for all meals & ambulate in room/unit) as tolerated to prevent further decline in function. Nsg staff notified.  Barriers to Discharge: None  Barriers to Discharge Comments: pt lives alone, but has support and assistance from family prn  Rehab Resource Intensity Level, PT: III (Minimum Resource Intensity)    See flowsheet documentation for full assessment.

## 2024-05-15 NOTE — ASSESSMENT & PLAN NOTE
Hemoglobin noted to be 6.7 on admission, dropped to 5.8  Received 2 units packed red blood cells with appropriate compensation  Hemoglobin 10.3 this AM  Noted to have severe iron deficiency anemia, completed 4 days IV Venofer  GI consult appreciated - plan for EGD/colonoscopy Thursday 5/16/24  On aspirin/Plavix per Neurology recs as above

## 2024-05-15 NOTE — PROGRESS NOTES
"[facility]  Progress Note  Name: Agatha Villeda I  MRN: [id number]  Unit/Bed#: -01 I Date of Admission: 5/10/2024   Date of Service: 5/15/2024 I Hospital Day: 5    Assessment & Plan   * Acute CVA (cerebrovascular accident) (HCC)  Assessment & Plan  Presented with R leg weakness that began 3 days prior to admission   CTA head/neck revealed:  \"CT head: -Hypodensities within the left parietal lobe and left cerebellar hemisphere may represent age-indeterminate infarct. Recommend MRI of the brain for further evaluation. -Chronic microangiopathic changes.  CTA head: -No large vessel occlusion. Question moderate to severe right and moderate left supraclinoid stenosis, possibly exaggerated due to motion. -Focal moderate to severe stenosis involving the proximal right P2 segment of the PCA.  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 - Approximately 50% left and less than 50% right stenosis of the proximal internal carotid arteries due to atherosclerosis.  Other: -Spondylosis of the cervical spine including grade 1 listhesis as above. -Mediastinal and bilateral hilar adenopathy, nonspecific. Clinical correlation and dedicated chest CT advised.\"  MRI revealed, \"1.  Multifocal acute/subacute infarcts involving bilateral frontoparietal and occipital lobes as well as left cerebellum suggesting embolic etiology. There is focal petechial hemorrhage within the left parietal lobe infarct. No mass effect. 2.  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 Chronic microangiopathic change. 3.  Right mastoid effusion. Correlate for mastoiditis.\"  Neurology consult appreciated - on aspirin 81mg daily, Plavix 75mg daily, Lipitor 40mg daily. Will need Loop recorder as outpatient   PT/OT    Acute anemia  Assessment & Plan  Hemoglobin noted to be 6.7 on admission, dropped to 5.8  Received 2 units packed red blood cells with appropriate compensation  Hemoglobin 10.3 this AM  Noted to " have severe iron deficiency anemia, completed 4 days IV Venofer  GI consult appreciated - plan for EGD/colonoscopy Thursday 5/16/24  On aspirin/Plavix per Neurology recs as above    Erythema of upper extremity  Assessment & Plan  Was contacted by nursing that patient had RUE redness and pain at the area of prior IV site in the right AC. Patient seen and examined. Mild erythema of Right AC area noted with mild tenderness.   Patient is afebrile but does have mild leukocytosis with WBCs of 12. Will place on IV Ancef for now for possible cellulitis.  Will also check RUE venous duplex to assess for possible DVT or superficial thrombophlebitis            VTE Pharmacologic Prophylaxis: VTE Score: 6 High Risk (Score >/= 5) - Pharmacological DVT Prophylaxis Ordered: heparin. Sequential Compression Devices Ordered.    Mobility:   Basic Mobility Inpatient Raw Score: 23  JH-HLM Goal: 7: Walk 25 feet or more  JH-HLM Achieved: 8: Walk 250 feet ot more  JH-HLM Goal achieved. Continue to encourage appropriate mobility.    Patient Centered Rounds: I performed bedside rounds with nursing staff today.   Discussions with Specialists or Other Care Team Provider:     Education and Discussions with Family / Patient: Updated  (granddaughter, Birgit) via phone.    Total Time Spent on Date of Encounter in care of patient: 30 mins. This time was spent on one or more of the following: performing physical exam; counseling and coordination of care; obtaining or reviewing history; documenting in the medical record; reviewing/ordering tests, medications or procedures; communicating with other healthcare professionals and discussing with patient's family/caregivers.    Current Length of Stay: 5 day(s)  Current Patient Status: Inpatient   Certification Statement: The patient will continue to require additional inpatient hospital stay due to EGD/colonoscopy tomorrow, IV abx and RUE US as above  Discharge Plan: Anticipate discharge in 48  hrs to home.    Code Status: Level 1 - Full Code    Subjective:   Ms. Villeda reports right arm mild tenderness this AM. Otherwise denies complaint    Objective:     Vitals:   Temp (24hrs), Av.6 °F (36.4 °C), Min:97.3 °F (36.3 °C), Max:97.9 °F (36.6 °C)    Temp:  [97.3 °F (36.3 °C)-97.9 °F (36.6 °C)] 97.9 °F (36.6 °C)  HR:  [83-99] 95  Resp:  [18-20] 18  BP: (112-153)/(50-73) 112/50  SpO2:  [92 %-97 %] 94 %  Body mass index is 24.2 kg/m².     Input and Output Summary (last 24 hours):     Intake/Output Summary (Last 24 hours) at 5/15/2024 0925  Last data filed at 5/15/2024 0843  Gross per 24 hour   Intake 1320 ml   Output 900 ml   Net 420 ml       Physical Exam:   Physical Exam  Vitals reviewed.   Constitutional:       Comments: Patient seen sitting in bed, NAD   Cardiovascular:      Rate and Rhythm: Normal rate and regular rhythm.   Pulmonary:      Effort: Pulmonary effort is normal. No respiratory distress.      Breath sounds: Normal breath sounds.   Abdominal:      General: Bowel sounds are normal.      Palpations: Abdomen is soft.      Tenderness: There is no abdominal tenderness.   Musculoskeletal:         General: Tenderness present.      Right lower leg: No edema.      Left lower leg: No edema.   Skin:     Findings: Erythema present.   Neurological:      Mental Status: She is alert and oriented to person, place, and time.   Psychiatric:         Mood and Affect: Mood normal.         Behavior: Behavior normal.          Additional Data:     Labs:  Results from last 7 days   Lab Units 05/15/24  0455   WBC Thousand/uL 12.16*   HEMOGLOBIN g/dL 10.3*   HEMATOCRIT % 35.0   PLATELETS Thousands/uL 560*   SEGS PCT % 81*   LYMPHO PCT % 8*   MONO PCT % 7   EOS PCT % 2     Results from last 7 days   Lab Units 05/15/24  0455 24  0436 05/10/24  0959   SODIUM mmol/L 137   < > 138   POTASSIUM mmol/L 3.8   < > 3.8   CHLORIDE mmol/L 105   < > 104   CO2 mmol/L 25   < > 24   BUN mg/dL 15   < > 12   CREATININE mg/dL 0.69    < > 0.93   ANION GAP mmol/L 7   < > 10   CALCIUM mg/dL 9.1   < > 9.4   ALBUMIN g/dL  --   --  4.0   TOTAL BILIRUBIN mg/dL  --   --  0.29   ALK PHOS U/L  --   --  68   ALT U/L  --   --  11   AST U/L  --   --  16   GLUCOSE RANDOM mg/dL 100   < > 126    < > = values in this interval not displayed.             Results from last 7 days   Lab Units 05/11/24  0436   HEMOGLOBIN A1C % 5.5           Lines/Drains:  Invasive Devices       Peripheral Intravenous Line  Duration             Peripheral IV 05/14/24 Distal;Dorsal (posterior);Left Forearm 1 day                      Telemetry:  Telemetry Orders (From admission, onward)               24 Hour Telemetry Monitoring  Continuous x 24 Hours (Telem)        Question:  Reason for 24 Hour Telemetry  Answer:  TIA/Suspected CVA/ Confirmed CVA                     Telemetry Reviewed: Normal Sinus Rhythm  Indication for Continued Telemetry Use: Acute CVA             Imaging: RUE venous US ordered and pending    Recent Cultures (last 7 days):         Last 24 Hours Medication List:   Current Facility-Administered Medications   Medication Dose Route Frequency Provider Last Rate    acetaminophen  650 mg Oral Q4H PRN David Dye MD      amLODIPine  5 mg Oral Daily David Dye MD      aspirin  81 mg Oral Daily David Dye MD      atorvastatin  40 mg Oral QPM David Dye MD      bisacodyl  10 mg Oral Once Judy Matthews PA-C      cefazolin  2,000 mg Intravenous Q8H Sterling Infante PA-C      clopidogrel  75 mg Oral Daily David Dye MD      heparin (porcine)  5,000 Units Subcutaneous Q8H [facility] David Dye MD      polyethylene glycol  238 g Oral Once Judy Matthews PA-C      polyethylene glycol  17 g Oral Daily PRN David Dye MD          Today, Patient Was Seen By: Sterling Infante PA-C    **Please Note: This note may have been constructed using a voice recognition system.**

## 2024-05-15 NOTE — PHYSICAL THERAPY NOTE
PT PROGRESS NOTE    Name: Agatha Villeda  AGE: 79 y.o.  MRN: [id number]  LENGTH OF STAY: 5     05/15/24 1016   PT Last Visit   PT Visit Date 05/15/24   Note Type   Note Type Treatment   Pain Assessment   Pain Assessment Tool 0-10   Pain Score No Pain   Restrictions/Precautions   Weight Bearing Precautions Per Order No   Other Precautions Chair Alarm;Bed Alarm;Multiple lines;Fall Risk   General   Chart Reviewed Yes   Response to Previous Treatment Patient with no complaints from previous session.   Family/Caregiver Present No   Cognition   Overall Cognitive Status WFL   Arousal/Participation Alert;Cooperative   Attention Within functional limits   Orientation Level Oriented X4   Following Commands Follows all commands and directions without difficulty   Comments engaged in appropriate conversation   Subjective   Subjective I will sit up in the chair now   Bed Mobility   Supine to Sit 6  Modified independent   Additional items HOB elevated;Increased time required   Additional Comments Pt greeted in supine. Pt OOB in chair.   Transfers   Sit to Stand 5  Supervision   Additional items Bedrails;Increased time required;Verbal cues  (w/ RW)   Stand to Sit 5  Supervision   Additional items Armrests;Increased time required;Verbal cues  (w/ RW)   Ambulation/Elevation   Gait pattern Narrow EUSEBIO;Decreased foot clearance;Short stride;Step through pattern   Gait Assistance 5  Supervision   Additional items Verbal cues   Assistive Device Rolling walker   Distance 420'x2   Stair Management Assistance 5  Supervision   Additional items Verbal cues   Stair Management Technique Two rails;Alternating pattern;Foreward;Reciprocal   Number of Stairs 4  (stair trainers)   Ambulation/Elevation Additional Comments RW properly fit for pt.   Balance   Static Sitting Normal   Dynamic Sitting Good   Static Standing Fair +  (w/ RW)   Dynamic Standing Fair  (w/ RW)   Ambulatory Fair  (w/ RW)   Activity Tolerance   Activity Tolerance Patient  tolerated treatment well   Medical Staff Made Aware OT Kati   Nurse Made Aware RN yes   Exercises   Balance training  Pt able to perform 2x10 sit<>stands with S and no use of UE support. use of RW in front for improved safety. cues to achieve full trunk extension each rep   Assessment   Prognosis Good   Problem List Decreased strength;Decreased endurance;Impaired balance;Decreased mobility   Assessment Patient was seen today per POC. Overall, pt demonstrated improved mobility and inc tolerance to activity. No pain reported. Required mod I for supine to sit, S sit<>stand, ambulation, and stair navigation. RW properly fit for pt. Pt able to ambulate 420'x2 with RW and S in unit. Dec gait speed with dec foot clearance; no gross LOB noted. Pt able to perform reciprocal stair navigation of 4 steps with bilateral hand rails to simulate home setup. Good tolerance to seated interventions with appropriate fatigue. Pt able to tolerate performing 2x10 sit<>stands without UE support and RW in front for improved safety. Inc fatigue and required seated rest break between sets for improved tolerance. No reports of dizziness t/o session. Will continue to see pt per POC as tolerated. Dec PT frequency 2* pt progress with functional mobility. Based on pt presentation and impaired function, pt would benefit from level III, (minimum resource intensity) at D/C. The patient's AM-PAC Basic Mobility Inpatient Short Form Raw Score is 24. A Raw score of greater than 16 suggests the patient may benefit from discharge to home. Please also refer to the recommendation of the Physical Therapist for safe discharge planning. [facility] staff to continue to mobilized pt (OOB in chair for all meals & ambulate in room/unit) as tolerated to prevent further decline in function. [facility] staff notified.   Barriers to Discharge None   Goals   Patient Goals to go home   STG Expiration Date 05/25/24   PT Treatment Day 2   Plan   Treatment/Interventions Functional  transfer training;LE strengthening/ROM;Elevations;Therapeutic exercise;Endurance training;Patient/family training;Bed mobility;Gait training;Spoke to nursing;OT   Progress Progressing toward goals   PT Frequency 1-2x/wk   Discharge Recommendation   Rehab Resource Intensity Level, PT III (Minimum Resource Intensity)   AM-PAC Basic Mobility Inpatient   Turning in Flat Bed Without Bedrails 4   Lying on Back to Sitting on Edge of Flat Bed Without Bedrails 4   Moving Bed to Chair 4   Standing Up From Chair Using Arms 4   Walk in Room 4   Climb 3-5 Stairs With Railing 4   Basic Mobility Inpatient Raw Score 24   Basic Mobility Standardized Score 57.68   [facility] Highest Level Of Mobility   JH-HLM Goal 8: Walk 250 feet or more   JH-HLM Achieved 8: Walk 250 feet ot more   Education   Education Provided Mobility training;Home exercise program;Assistive device   Patient Demonstrates acceptance/verbal understanding   End of Consult   Patient Position at End of Consult Bedside chair;Bed/Chair alarm activated;All needs within reach     Yuli Barone, PT

## 2024-05-15 NOTE — QUICK NOTE
Sterling Infante made aware patient is having discomfort at RUE antecubital area which is pinker, and has non pitting edema. Patient previously had an IV removed 3 days ago from the same area due to leakage.

## 2024-05-15 NOTE — OCCUPATIONAL THERAPY NOTE
Occupational Therapy Progress Note     Patient Name: Agatha Villeda  Today's Date: 5/15/2024  Problem List  Principal Problem:    Acute CVA (cerebrovascular accident) (HCC)  Active Problems:    Acute anemia    Erythema of upper extremity        05/15/24 0938   OT Last Visit   OT Visit Date 05/15/24   Note Type   Note Type Treatment   Pain Assessment   Pain Assessment Tool 0-10   Pain Score No Pain   Restrictions/Precautions   Other Precautions Bed Alarm;Chair Alarm;Fall Risk   ADL   Where Assessed Chair   Grooming Assistance 5  Supervision/Setup   LB Dressing Assistance 5  Supervision/Setup   LB Dressing Deficit Setup;Increased time to complete;Supervision/safety   Toileting Comments   (no need to void during today's session)   Transfers   Sit to Stand 5  Supervision   Additional items Increased time required;Verbal cues   Stand to Sit 5  Supervision   Additional items Increased time required;Verbal cues   Stand pivot 5  Supervision   Additional items Increased time required;Verbal cues   Additional Comments all functional transfers completed during today's session c supervision level and use of RW   Functional Mobility   Functional Mobility 5  Supervision   Additional Comments community distances in hallway over two seperate trials during today's session   Additional items Rolling walker   Therapeutic Excerise-Strength   UE Strength Yes   Right Upper Extremity- Strength   R Shoulder Flexion;Extension   R Elbow Elbow flexion;Elbow extension   Left Upper Extremity-Strength   L Shoulder Flexion;Extension   L Elbow Elbow extension;Elbow flexion   Coordination   Fine Motor intact- no apparent residual deficits from CVA   Cognition   Overall Cognitive Status WFL   Arousal/Participation Alert;Responsive;Cooperative   Attention Within functional limits   Orientation Level Oriented X4   Activity Tolerance   Activity Tolerance Patient tolerated treatment well   Medical Staff Made Aware Yuli WELCH   Assessment    Assessment Pt tolerated today's session well and was pleasant and cooperative throughout. Pt agreeable to all therapy directed tasks and noted that she was very active PTA and is eager to get back home and continue with her active lifestyle while participating in outpatient PT. Pt completed LB dressing, B UE TE, and functional mobility of community distances throughout today's session with supervision level using RW.   Plan   Treatment Interventions ADL retraining;Functional transfer training;Endurance training;Energy conservation;Activityengagement   Goal Expiration Date 05/26/24   OT Frequency 1-2x/wk   Discharge Recommendation   Rehab Resource Intensity Level, OT No post-acute rehabilitation needs   AM-PAC Daily Activity Inpatient   Lower Body Dressing 4   Bathing 4   Toileting 4   Upper Body Dressing 4   Grooming 4   Eating 4   Daily Activity Raw Score 24   Daily Activity Standardized Score (Calc for Raw Score >=11) 57.54   AM-PAC Applied Cognition Inpatient   Following a Speech/Presentation 4   Understanding Ordinary Conversation 4   Taking Medications 4   Remembering Where Things Are Placed or Put Away 4   Remembering List of 4-5 Errands 3   Taking Care of Complicated Tasks 3   Applied Cognition Raw Score 22   Applied Cognition Standardized Score 47.83         The patient's raw score on the -PAC Daily Activity inpatient short form is 24, standardized score is 57.54, greater than 39.4. Patients at this level are likely to benefit from DC to home. Please refer to the recommendation of the Occupational Therapist for safe DC planning.    Pt goals to be met by 5/26/2024     Pt will demonstrate ability to complete grooming/hygiene tasks @ I after set-up.  Pt will demonstrate ability to complete supine<>sit @ I in order to increase safety and independence during ADL tasks.  Pt will demonstrate ability to complete UB ADLs including washing/dressing @ I in order to increase performance and participation during  meaningful tasks  Pt will demonstrate ability to complete LB dressing @ I in order to increase safety and independence during meaningful tasks.   Pt will demonstrate ability to kody/doff socks/shoes while sitting EOB @ I in order to increase safety and independence during meaningful tasks.   Pt will demonstrate ability to complete toileting tasks including CM and pericare @ I in order to increase safety and independence during meaningful tasks.  Pt will demonstrate ability to complete EOB, chair, toilet/commode transfers @ I in order to increase performance and participation during functional tasks.  Pt will demonstrate ability to stand for 8-10 minutes while maintaining Good balance without the use of AD during ADL tasks.  Pt will demonstrate ability to tolerate 30-35 minute OT session with no vc'ing for deep breathing or use of energy conservation techniques in order to increase activity tolerance during functional tasks.   Pt will demonstrate Good carryover of use of energy conservation/compensatory strategies during ADLs and functional tasks in order to increase safety and reduce risk for falls.   Pt will demonstrate Good attention and participation in continued evaluation of functional ambulation house hold distances in order to assist with safe d/c planning.  Pt will attend to continued cognitive assessments 100% of the time in order to provide most appropriate d/c recommendations.   Pt will follow 100% simple 2-step commands and be A&O x4 consistently with environmental cues to increase participation in functional activities.   Pt will identify 3 areas of interest/hobbies and 1 intervention on how to incorporate into daily life in order to increase interaction with environment and peers as well as increase participation in meaningful tasks.   Pt will demonstrate 100% carryover of BUE HEP in order to increase BUE MS and increase performance during functional tasks upon d/c home.    Pt tolerated today's session  well and was pleasant and cooperative throughout. Pt plan is D/C home when medically stable with outpatient PT services. Pt safely seated in recliner chair at end of session with all needs in reach, alarm activated, no further questions/concerns at this time.       Kati Perez MS OTR/L

## 2024-05-15 NOTE — ASSESSMENT & PLAN NOTE
Was contacted by nursing that patient had RUE redness and pain at the area of prior IV site in the right AC. Patient seen and examined. Mild erythema of Right AC area noted with mild tenderness.   Patient is afebrile but does have mild leukocytosis with WBCs of 12. Will place on IV Ancef for now for possible cellulitis.  Will also check RUE venous duplex to assess for possible DVT or superficial thrombophlebitis

## 2024-05-15 NOTE — PLAN OF CARE
Problem: OCCUPATIONAL THERAPY ADULT  Goal: Performs self-care activities at highest level of function for planned discharge setting.  See evaluation for individualized goals.  Description: Treatment Interventions: ADL retraining, Functional transfer training, Endurance training, Energy conservation, Activityengagement          See flowsheet documentation for full assessment, interventions and recommendations.   Outcome: Progressing  Note: Limitation: Decreased high-level ADLs, Decreased endurance  Prognosis: Good  Assessment: Pt tolerated today's session well and was pleasant and cooperative throughout. Pt agreeable to all therapy directed tasks and noted that she was very active PTA and is eager to get back home and continue with her active lifestyle while participating in outpatient PT. Pt completed LB dressing, B UE TE, and functional mobility of community distances throughout today's session with supervision level using RW.     Rehab Resource Intensity Level, OT: No post-acute rehabilitation needs

## 2024-05-16 ENCOUNTER — ANESTHESIA (OUTPATIENT)
Dept: ANESTHESIOLOGY | Facility: HOSPITAL | Age: 79
End: 2024-05-16

## 2024-05-16 ENCOUNTER — ANESTHESIA (INPATIENT)
Dept: GASTROENTEROLOGY | Facility: HOSPITAL | Age: 79
DRG: 811 | End: 2024-05-16
Payer: MEDICARE

## 2024-05-16 ENCOUNTER — ANESTHESIA EVENT (OUTPATIENT)
Dept: ANESTHESIOLOGY | Facility: HOSPITAL | Age: 79
End: 2024-05-16

## 2024-05-16 ENCOUNTER — APPOINTMENT (INPATIENT)
Dept: GASTROENTEROLOGY | Facility: HOSPITAL | Age: 79
DRG: 811 | End: 2024-05-16
Payer: MEDICARE

## 2024-05-16 ENCOUNTER — ANESTHESIA EVENT (INPATIENT)
Dept: GASTROENTEROLOGY | Facility: HOSPITAL | Age: 79
DRG: 811 | End: 2024-05-16
Payer: MEDICARE

## 2024-05-16 PROBLEM — D75.839 THROMBOCYTOSIS: Status: ACTIVE | Noted: 2024-05-16

## 2024-05-16 LAB
ANION GAP SERPL CALCULATED.3IONS-SCNC: 8 MMOL/L (ref 4–13)
BUN SERPL-MCNC: 12 MG/DL (ref 5–25)
CALCIUM SERPL-MCNC: 9.6 MG/DL (ref 8.4–10.2)
CHLORIDE SERPL-SCNC: 105 MMOL/L (ref 96–108)
CO2 SERPL-SCNC: 26 MMOL/L (ref 21–32)
CREAT SERPL-MCNC: 0.77 MG/DL (ref 0.6–1.3)
ERYTHROCYTE [DISTWIDTH] IN BLOOD BY AUTOMATED COUNT: 23.6 % (ref 11.6–15.1)
GFR SERPL CREATININE-BSD FRML MDRD: 73 ML/MIN/1.73SQ M
GLUCOSE SERPL-MCNC: 98 MG/DL (ref 65–140)
HCT VFR BLD AUTO: 34.9 % (ref 34.8–46.1)
HGB BLD-MCNC: 10.2 G/DL (ref 11.5–15.4)
MCH RBC QN AUTO: 22.5 PG (ref 26.8–34.3)
MCHC RBC AUTO-ENTMCNC: 29.2 G/DL (ref 31.4–37.4)
MCV RBC AUTO: 77 FL (ref 82–98)
PLATELET # BLD AUTO: 550 THOUSANDS/UL (ref 149–390)
PMV BLD AUTO: 8.9 FL (ref 8.9–12.7)
POTASSIUM SERPL-SCNC: 3.8 MMOL/L (ref 3.5–5.3)
RBC # BLD AUTO: 4.54 MILLION/UL (ref 3.81–5.12)
SODIUM SERPL-SCNC: 139 MMOL/L (ref 135–147)
WBC # BLD AUTO: 11.34 THOUSAND/UL (ref 4.31–10.16)

## 2024-05-16 PROCEDURE — 80048 BASIC METABOLIC PNL TOTAL CA: CPT | Performed by: PHYSICIAN ASSISTANT

## 2024-05-16 PROCEDURE — 85027 COMPLETE CBC AUTOMATED: CPT | Performed by: PHYSICIAN ASSISTANT

## 2024-05-16 PROCEDURE — 0DJ08ZZ INSPECTION OF UPPER INTESTINAL TRACT, VIA NATURAL OR ARTIFICIAL OPENING ENDOSCOPIC: ICD-10-PCS | Performed by: HOSPITALIST

## 2024-05-16 PROCEDURE — 99232 SBSQ HOSP IP/OBS MODERATE 35: CPT

## 2024-05-16 PROCEDURE — 0DJD8ZZ INSPECTION OF LOWER INTESTINAL TRACT, VIA NATURAL OR ARTIFICIAL OPENING ENDOSCOPIC: ICD-10-PCS | Performed by: HOSPITALIST

## 2024-05-16 PROCEDURE — 97535 SELF CARE MNGMENT TRAINING: CPT

## 2024-05-16 RX ORDER — PROPOFOL 10 MG/ML
INJECTION, EMULSION INTRAVENOUS AS NEEDED
Status: DISCONTINUED | OUTPATIENT
Start: 2024-05-16 | End: 2024-05-16

## 2024-05-16 RX ORDER — SODIUM CHLORIDE, SODIUM LACTATE, POTASSIUM CHLORIDE, CALCIUM CHLORIDE 600; 310; 30; 20 MG/100ML; MG/100ML; MG/100ML; MG/100ML
INJECTION, SOLUTION INTRAVENOUS CONTINUOUS PRN
Status: DISCONTINUED | OUTPATIENT
Start: 2024-05-16 | End: 2024-05-16

## 2024-05-16 RX ORDER — LIDOCAINE HYDROCHLORIDE 20 MG/ML
INJECTION, SOLUTION EPIDURAL; INFILTRATION; INTRACAUDAL; PERINEURAL AS NEEDED
Status: DISCONTINUED | OUTPATIENT
Start: 2024-05-16 | End: 2024-05-16

## 2024-05-16 RX ADMIN — PROPOFOL 50 MG: 10 INJECTION, EMULSION INTRAVENOUS at 15:26

## 2024-05-16 RX ADMIN — SODIUM CHLORIDE, SODIUM LACTATE, POTASSIUM CHLORIDE, AND CALCIUM CHLORIDE: .6; .31; .03; .02 INJECTION, SOLUTION INTRAVENOUS at 15:15

## 2024-05-16 RX ADMIN — ATORVASTATIN CALCIUM 40 MG: 40 TABLET, FILM COATED ORAL at 17:22

## 2024-05-16 RX ADMIN — CLOPIDOGREL 75 MG: 75 TABLET ORAL at 08:25

## 2024-05-16 RX ADMIN — PROPOFOL 50 MG: 10 INJECTION, EMULSION INTRAVENOUS at 15:29

## 2024-05-16 RX ADMIN — PROPOFOL 50 MG: 10 INJECTION, EMULSION INTRAVENOUS at 15:24

## 2024-05-16 RX ADMIN — PROPOFOL 120 MCG/KG/MIN: 10 INJECTION, EMULSION INTRAVENOUS at 15:35

## 2024-05-16 RX ADMIN — CEFAZOLIN SODIUM 2000 MG: 2 SOLUTION INTRAVENOUS at 10:18

## 2024-05-16 RX ADMIN — PROPOFOL 40 MG: 10 INJECTION, EMULSION INTRAVENOUS at 15:34

## 2024-05-16 RX ADMIN — HEPARIN SODIUM 5000 UNITS: 5000 INJECTION INTRAVENOUS; SUBCUTANEOUS at 22:06

## 2024-05-16 RX ADMIN — CEFAZOLIN SODIUM 2000 MG: 2 SOLUTION INTRAVENOUS at 01:58

## 2024-05-16 RX ADMIN — ASPIRIN 81 MG CHEWABLE TABLET 81 MG: 81 TABLET CHEWABLE at 08:25

## 2024-05-16 RX ADMIN — PROPOFOL 70 MG: 10 INJECTION, EMULSION INTRAVENOUS at 15:22

## 2024-05-16 RX ADMIN — HEPARIN SODIUM 5000 UNITS: 5000 INJECTION INTRAVENOUS; SUBCUTANEOUS at 05:25

## 2024-05-16 RX ADMIN — PROPOFOL 50 MG: 10 INJECTION, EMULSION INTRAVENOUS at 15:32

## 2024-05-16 RX ADMIN — PROPOFOL 40 MG: 10 INJECTION, EMULSION INTRAVENOUS at 15:41

## 2024-05-16 RX ADMIN — AMLODIPINE BESYLATE 5 MG: 5 TABLET ORAL at 08:25

## 2024-05-16 RX ADMIN — LIDOCAINE HYDROCHLORIDE 80 MG: 20 INJECTION, SOLUTION EPIDURAL; INFILTRATION; INTRACAUDAL; PERINEURAL at 15:22

## 2024-05-16 NOTE — PROGRESS NOTES
"[facility]  Progress Note  Name: Agatha Villeda I  MRN: [id number]  Unit/Bed#: -01 I Date of Admission: 5/10/2024   Date of Service: 5/16/2024 I Hospital Day: 6    Assessment & Plan   * Acute CVA (cerebrovascular accident) (HCC)  Assessment & Plan  Presented with R leg weakness that began 3 days prior to admission   CTA head/neck revealed:  \"CT head: -Hypodensities within the left parietal lobe and left cerebellar hemisphere may represent age-indeterminate infarct. Recommend MRI of the brain for further evaluation. -Chronic microangiopathic changes.  CTA head: -No large vessel occlusion. Question moderate to severe right and moderate left supraclinoid stenosis, possibly exaggerated due to motion. -Focal moderate to severe stenosis involving the proximal right P2 segment of the PCA.  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 - Approximately 50% left and less than 50% right stenosis of the proximal internal carotid arteries due to atherosclerosis.  Other: -Spondylosis of the cervical spine including grade 1 listhesis as above. -Mediastinal and bilateral hilar adenopathy, nonspecific. Clinical correlation and dedicated chest CT advised.\"  MRI revealed, \"1.  Multifocal acute/subacute infarcts involving bilateral frontoparietal and occipital lobes as well as left cerebellum suggesting embolic etiology. There is focal petechial hemorrhage within the left parietal lobe infarct. No mass effect. 2.  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 Chronic microangiopathic change. 3.  Right mastoid effusion. Correlate for mastoiditis.\"  Neurology consult appreciated - on aspirin 81mg daily, Plavix 75mg daily, Lipitor 40mg daily. Will need Loop recorder as outpatient   PT/OT - will discharge home with referral for outpt PT  Ambulatory referral for vascular surgery on discharge     Thrombocytosis  Assessment & Plan  Unclear if reactive given recent anemia or if cause of " recent embolic stroke   Recommend hematology referral on discharge       Erythema of upper extremity  Assessment & Plan  Was contacted by nursing that patient had RUE redness and pain at the area of prior IV site in the right AC. Patient seen and examined. Mild erythema of Right AC area noted with mild tenderness.   Patient is afebrile but does have mild leukocytosis with WBCs of 12. S/p 4 doses  Area of arm is slightly erythematous but does not appear to be infectious - will d/c abx for now 5/16  VAS upper limb venous duplex   R: No evidence of acute or chronic DVT.  Evidence of acute occlusive superficial thrombophlebitis noted in cephalic vein from the antecubital fossa to mid upper arm   L: No evidence of thrombus in the internal jugular vein, subclavian vein, and innominate vein  Continue with supportive care, elevation, and heat    Acute anemia  Assessment & Plan  Hemoglobin noted to be 6.7 on admission, dropped to 5.8  Received 2 units packed red blood cells with appropriate compensation  Hgb stable   Noted to have severe iron deficiency anemia, completed 4 days IV Venofer  GI consult appreciated - Plan for EGD/colonoscopy today 5/16  On aspirin/Plavix per Neurology recs as above         VTE Pharmacologic Prophylaxis: VTE Score: 6 High Risk (Score >/= 5) - Pharmacological DVT Prophylaxis Ordered: heparin. Sequential Compression Devices Ordered.    Mobility:   Basic Mobility Inpatient Raw Score: 24  JH-HLM Goal: 8: Walk 250 feet or more  JH-HLM Achieved: 7: Walk 25 feet or more  JH-HLM Goal achieved. Continue to encourage appropriate mobility.    Patient Centered Rounds: I performed bedside rounds with nursing staff today.   Discussions with Specialists or Other Care Team Provider: None    Education and Discussions with Family / Patient:  Will update grandchild.     Total Time Spent on Date of Encounter in care of patient: This time was spent on one or more of the following: performing physical exam;  counseling and coordination of care; obtaining or reviewing history; documenting in the medical record; reviewing/ordering tests, medications or procedures; communicating with other healthcare professionals and discussing with patient's family/caregivers.    Current Length of Stay: 6 day(s)  Current Patient Status: Inpatient   Certification Statement: The patient will continue to require additional inpatient hospital stay due to EGD/colonoscopy today  Discharge Plan: Anticipate discharge tomorrow to home.    Code Status: Level 1 - Full Code    Subjective:   Seen and examined.  No acute events overnight.  States that she is overall feeling better.  She is aware of colonoscopy/endoscopy today.    Objective:     Vitals:   Temp (24hrs), Av.6 °F (36.4 °C), Min:97.3 °F (36.3 °C), Max:98.4 °F (36.9 °C)    Temp:  [97.3 °F (36.3 °C)-98.4 °F (36.9 °C)] 98.4 °F (36.9 °C)  HR:  [69-93] 88  Resp:  [16-20] 18  BP: (121-167)/(58-86) 167/72  SpO2:  [93 %-98 %] 98 %  Body mass index is 23.24 kg/m².     Input and Output Summary (last 24 hours):     Intake/Output Summary (Last 24 hours) at 2024 1535  Last data filed at 5/15/2024 1725  Gross per 24 hour   Intake 960 ml   Output --   Net 960 ml       Physical Exam:   Physical Exam  Constitutional:       General: She is not in acute distress.  HENT:      Mouth/Throat:      Mouth: Mucous membranes are moist.   Eyes:      Conjunctiva/sclera: Conjunctivae normal.   Cardiovascular:      Heart sounds: Normal heart sounds.   Pulmonary:      Effort: Pulmonary effort is normal.      Breath sounds: Normal breath sounds.   Abdominal:      Palpations: Abdomen is soft.   Musculoskeletal:      Right lower leg: No edema.      Left lower leg: No edema.   Skin:     General: Skin is warm and dry.      Findings: Erythema (To RUE 2/2 Superficial thrombophlebitis) present.   Neurological:      Cranial Nerves: No cranial nerve deficit.      Motor: Weakness (L>R hand) present.          Additional  Data:     Labs:  Results from last 7 days   Lab Units 05/16/24  0523 05/15/24  0455   WBC Thousand/uL 11.34* 12.16*   HEMOGLOBIN g/dL 10.2* 10.3*   HEMATOCRIT % 34.9 35.0   PLATELETS Thousands/uL 550* 560*   SEGS PCT %  --  81*   LYMPHO PCT %  --  8*   MONO PCT %  --  7   EOS PCT %  --  2     Results from last 7 days   Lab Units 05/16/24  0523 05/11/24  0436 05/10/24  0959   SODIUM mmol/L 139   < > 138   POTASSIUM mmol/L 3.8   < > 3.8   CHLORIDE mmol/L 105   < > 104   CO2 mmol/L 26   < > 24   BUN mg/dL 12   < > 12   CREATININE mg/dL 0.77   < > 0.93   ANION GAP mmol/L 8   < > 10   CALCIUM mg/dL 9.6   < > 9.4   ALBUMIN g/dL  --   --  4.0   TOTAL BILIRUBIN mg/dL  --   --  0.29   ALK PHOS U/L  --   --  68   ALT U/L  --   --  11   AST U/L  --   --  16   GLUCOSE RANDOM mg/dL 98   < > 126    < > = values in this interval not displayed.             Results from last 7 days   Lab Units 05/11/24  0436   HEMOGLOBIN A1C % 5.5           Lines/Drains:  Invasive Devices       Peripheral Intravenous Line  Duration             Peripheral IV 05/14/24 Distal;Dorsal (posterior);Left Forearm 2 days                      Telemetry:  Telemetry Orders (From admission, onward)               24 Hour Telemetry Monitoring  Continuous x 24 Hours (Telem)        Question:  Reason for 24 Hour Telemetry  Answer:  TIA/Suspected CVA/ Confirmed CVA                     Telemetry Reviewed: Normal Sinus Rhythm  Indication for Continued Telemetry Use: Acute CVA             Imaging: Reviewed radiology reports from this admission including: ultrasound(s)    Recent Cultures (last 7 days):         Last 24 Hours Medication List:   Current Facility-Administered Medications   Medication Dose Route Frequency Provider Last Rate    acetaminophen  650 mg Oral Q4H PRN David Dye MD      amLODIPine  5 mg Oral Daily David Dye MD      aspirin  81 mg Oral Daily David Dye MD      atorvastatin  40 mg Oral QPM David Dye MD      clopidogrel  75 mg Oral Daily  David Dye MD      heparin (porcine)  5,000 Units Subcutaneous Q8H [facility] David Dye MD      polyethylene glycol  17 g Oral Daily PRN David Dye MD       Facility-Administered Medications Ordered in Other Encounters   Medication Dose Route Frequency Provider Last Rate    lactated ringers   Intravenous Continuous PRN Noah Hermosillo CRNA      lidocaine (PF)   Intravenous PRN Noah Hermosillo CRNA      propofol   Intravenous PRN Noah Hermosillo CRNA          Today, Patient Was Seen By: Tana Sanders PA-C    **Please Note: This note may have been constructed using a voice recognition system.**

## 2024-05-16 NOTE — ASSESSMENT & PLAN NOTE
Hemoglobin noted to be 6.7 on admission, dropped to 5.8  Received 2 units packed red blood cells with appropriate compensation  Hgb stable   Noted to have severe iron deficiency anemia, completed 4 days IV Venofer  GI consult appreciated - Plan for EGD/colonoscopy today 5/16  On aspirin/Plavix per Neurology recs as above

## 2024-05-16 NOTE — ASSESSMENT & PLAN NOTE
Unclear if reactive given recent anemia or if cause of recent embolic stroke   Recommend hematology referral on discharge

## 2024-05-16 NOTE — PLAN OF CARE
Problem: PAIN - ADULT  Goal: Verbalizes/displays adequate comfort level or baseline comfort level  Description: Interventions:  - Encourage patient to monitor pain and request assistance  - Assess pain using appropriate pain scale  - Administer analgesics based on type and severity of pain and evaluate response  - Implement non-pharmacological measures as appropriate and evaluate response  - Consider cultural and social influences on pain and pain management  - Notify physician/advanced practitioner if interventions unsuccessful or patient reports new pain  Outcome: Progressing     Problem: INFECTION - ADULT  Goal: Absence or prevention of progression during hospitalization  Description: INTERVENTIONS:  - Assess and monitor for signs and symptoms of infection  - Monitor lab/diagnostic results  - Monitor all insertion sites, i.e. indwelling lines, tubes, and drains  - Monitor endotracheal if appropriate and nasal secretions for changes in amount and color  - [place] appropriate cooling/warming therapies per order  - Administer medications as ordered  - Instruct and encourage patient and family to use good hand hygiene technique  - Identify and instruct in appropriate isolation precautions for identified infection/condition  Outcome: Progressing  Goal: Absence of fever/infection during neutropenic period  Description: INTERVENTIONS:  - Monitor WBC    Outcome: Progressing     Problem: SAFETY ADULT  Goal: Patient will remain free of falls  Description: INTERVENTIONS:  - Educate patient/family on patient safety including physical limitations  - Instruct patient to call for assistance with activity   - Consult OT/PT to assist with strengthening/mobility   - Keep Call bell within reach  - Keep bed low and locked with side rails adjusted as appropriate  - Keep care items and personal belongings within reach  - Initiate and maintain comfort rounds  - Make Fall Risk Sign visible to staff  - Offer Toileting every 2 Hours,  in advance of need  - Initiate/Maintain bed alarm  - Obtain necessary fall risk management equipment  - Apply yellow socks and bracelet for high fall risk patients  - Consider moving patient to room near nurses station  Outcome: Progressing  Goal: Maintain or return to baseline ADL function  Description: INTERVENTIONS:  -  Assess patient's ability to carry out ADLs; assess patient's baseline for ADL function and identify physical deficits which impact ability to perform ADLs (bathing, care of mouth/teeth, toileting, grooming, dressing, etc.)  - Assess/evaluate cause of self-care deficits   - Assess range of motion  - Assess patient's mobility; develop plan if impaired  - Assess patient's need for assistive devices and provide as appropriate  - Encourage maximum independence but intervene and supervise when necessary  - Involve family in performance of ADLs  - Assess for home care needs following discharge   - Consider OT consult to assist with ADL evaluation and planning for discharge  - Provide patient education as appropriate  Outcome: Progressing  Goal: Maintains/Returns to pre admission functional level  Description: INTERVENTIONS:  - Perform AM-PAC 6 Click Basic Mobility/ Daily Activity assessment daily.  - Set and communicate daily mobility goal to care team and patient/family/caregiver.   - Collaborate with rehabilitation services on mobility goals if consulted  - Perform Range of Motion 3 times a day.  - Reposition patient every 2 hours.  - Dangle patient 3 times a day  - Stand patient 3 times a day  - Ambulate patient 3 times a day  - Out of bed to chair 3 times a day   - Out of bed for meals 3 times a day  - Out of bed for toileting  - Record patient progress and toleration of activity level   Outcome: Progressing     Problem: DISCHARGE PLANNING  Goal: Discharge to home or other facility with appropriate resources  Description: INTERVENTIONS:  - Identify barriers to discharge w/patient and caregiver  -  Arrange for needed discharge resources and transportation as appropriate  - Identify discharge learning needs (meds, wound care, etc.)  - Arrange for interpretive services to assist at discharge as needed  - Refer to Case Management Department for coordinating discharge planning if the patient needs post-hospital services based on physician/advanced practitioner order or complex needs related to functional status, cognitive ability, or social support system  Outcome: Progressing     Problem: Knowledge Deficit  Goal: Patient/family/caregiver demonstrates understanding of disease process, treatment plan, medications, and discharge instructions  Description: Complete learning assessment and assess knowledge base.  Interventions:  - Provide teaching at level of understanding  - Provide teaching via preferred learning methods  Outcome: Progressing     Problem: Nutrition/Hydration-ADULT  Goal: Nutrient/Hydration intake appropriate for improving, restoring or maintaining nutritional needs  Description: Monitor and assess patient's nutrition/hydration status for malnutrition. Collaborate with interdisciplinary team and initiate plan and interventions as ordered.  Monitor patient's weight and dietary intake as ordered or per policy. Utilize nutrition screening tool and intervene as necessary. Determine patient's food preferences and provide high-protein, high-caloric foods as appropriate.     INTERVENTIONS:  - Monitor oral intake, urinary output, labs, and treatment plans  - Assess nutrition and hydration status and recommend course of action  - Evaluate amount of meals eaten  - Assist patient with eating if necessary   - Allow adequate time for meals  - Recommend/ encourage appropriate diets, oral nutritional supplements, and vitamin/mineral supplements  - Order, calculate, and assess calorie counts as needed  - Recommend, monitor, and adjust tube feedings and TPN/PPN based on assessed needs  - Assess need for intravenous  fluids  - Provide specific nutrition/hydration education as appropriate  - Include patient/family/caregiver in decisions related to nutrition  Outcome: Progressing     Problem: CARDIOVASCULAR - ADULT  Goal: Maintains optimal cardiac output and hemodynamic stability  Description: INTERVENTIONS:  - Monitor I/O, vital signs and rhythm  - Monitor for S/S and trends of decreased cardiac output  - Administer and titrate ordered vasoactive medications to optimize hemodynamic stability  - Assess quality of pulses, skin color and temperature  - Assess for signs of decreased coronary artery perfusion  - Instruct patient to report change in severity of symptoms  Outcome: Progressing  Goal: Absence of cardiac dysrhythmias or at baseline rhythm  Description: INTERVENTIONS:  - Continuous cardiac monitoring, vital signs, obtain 12 lead EKG if ordered  - Administer antiarrhythmic and heart rate control medications as ordered  - Monitor electrolytes and administer replacement therapy as ordered  Outcome: Progressing     Problem: GASTROINTESTINAL - ADULT  Goal: Minimal or absence of nausea and/or vomiting  Description: INTERVENTIONS:  - Administer IV fluids if ordered to ensure adequate hydration  - Maintain NPO status until nausea and vomiting are resolved  - Nasogastric tube if ordered  - Administer ordered antiemetic medications as needed  - Provide nonpharmacologic comfort measures as appropriate  - Advance diet as tolerated, if ordered  - Consider nutrition services referral to assist patient with adequate nutrition and appropriate food choices  Outcome: Progressing  Goal: Maintains or returns to baseline bowel function  Description: INTERVENTIONS:  - Assess bowel function  - Encourage oral fluids to ensure adequate hydration  - Administer IV fluids if ordered to ensure adequate hydration  - Administer ordered medications as needed  - Encourage mobilization and activity  - Consider nutritional services referral to assist patient  with adequate nutrition and appropriate food choices  Outcome: Progressing  Goal: Maintains adequate nutritional intake  Description: INTERVENTIONS:  - Monitor percentage of each meal consumed  - Identify factors contributing to decreased intake, treat as appropriate  - Assist with meals as needed  - Monitor I&O, weight, and lab values if indicated  - Obtain nutrition services referral as needed  Outcome: Progressing  Goal: Establish and maintain optimal ostomy function  Description: INTERVENTIONS:  - Assess bowel function  - Encourage oral fluids to ensure adequate hydration  - Administer IV fluids if ordered to ensure adequate hydration   - Administer ordered medications as needed  - Encourage mobilization and activity  - Nutrition services referral to assist patient with appropriate food choices  - Assess stoma site  - Consider wound care consult   Outcome: Progressing  Goal: Oral mucous membranes remain intact  Description: INTERVENTIONS  - Assess oral mucosa and hygiene practices  - Implement preventative oral hygiene regimen  - Implement oral medicated treatments as ordered  - Initiate Nutrition services referral as needed  Outcome: Progressing     Problem: METABOLIC, FLUID AND ELECTROLYTES - ADULT  Goal: Electrolytes maintained within normal limits  Description: INTERVENTIONS:  - Monitor labs and assess patient for signs and symptoms of electrolyte imbalances  - Administer electrolyte replacement as ordered  - Monitor response to electrolyte replacements, including repeat lab results as appropriate  - Instruct patient on fluid and nutrition as appropriate  Outcome: Progressing  Goal: Fluid balance maintained  Description: INTERVENTIONS:  - Monitor labs   - Monitor I/O and WT  - Instruct patient on fluid and nutrition as appropriate  - Assess for signs & symptoms of volume excess or deficit  Outcome: Progressing  Goal: Glucose maintained within target range  Description: INTERVENTIONS:  - Monitor Blood Glucose  as ordered  - Assess for signs and symptoms of hyperglycemia and hypoglycemia  - Administer ordered medications to maintain glucose within target range  - Assess nutritional intake and initiate nutrition service referral as needed  Outcome: Progressing     Problem: HEMATOLOGIC - ADULT  Goal: Maintains hematologic stability  Description: INTERVENTIONS  - Assess for signs and symptoms of bleeding or hemorrhage  - Monitor labs  - Administer supportive blood products/factors as ordered and appropriate  Outcome: Progressing     Problem: Neurological Deficit  Goal: Neurological status is stable or improving  Description: Interventions:  - Monitor and assess patient's level of consciousness, motor function, sensory function, and level of assistance needed for ADLs.   - Monitor and report changes from baseline. Collaborate with interdisciplinary team to initiate plan and implement interventions as ordered.   - Provide and maintain a safe environment.  - Consider seizure precautions.  - Consider fall precautions.  - Consider aspiration precautions.  - Consider bleeding precautions.  Outcome: Progressing     Problem: Activity Intolerance/Impaired Mobility  Goal: Mobility/activity is maintained at optimum level for patient  Description: Interventions:  - Assess and monitor patient  barriers to mobility and need for assistive/adaptive devices.  - Assess patient's emotional response to limitations.  - Collaborate with interdisciplinary team and initiate plans and interventions as ordered.  - Encourage independent activity per ability.  - Maintain proper body alignment.  - Perform active/passive rom as tolerated/ordered.  - Plan activities to conserve energy.  - Turn patient as appropriate  Outcome: Progressing     Problem: Potential for Aspiration  Goal: Non-ventilated patient's risk of aspiration is minimized  Description: Assess and monitor vital signs, respiratory status, and labs (WBC).  Monitor for signs of aspiration  (tachypnea, cough, rales, wheezing, cyanosis, fever).    - Assess and monitor patient's ability to swallow.  - Place patient up in chair to eat if possible.  - HOB up at 90 degrees to eat if unable to get patient up into chair.  - Supervise patient during oral intake.   - Instruct patient/ family to take small bites.  - Instruct patient/ family to take small single sips when taking liquids.  - Follow patient-specific strategies generated by speech pathologist.  Outcome: Progressing

## 2024-05-16 NOTE — ANESTHESIA PREPROCEDURE EVALUATION
Procedure:  EGD  COLONOSCOPY    Relevant Problems   HEMATOLOGY   (+) Acute anemia      NEURO/PSYCH   (+) Acute CVA (cerebrovascular accident) (HCC)        Left Ventricle: Left ventricle is normal in size and function.  Estimated LVEF is 70%.  Mild concentric left ventricular hypertrophy.  No regional wall motion abnormality. Diastolic function is mildly abnormal, consistent with grade I (abnormal) relaxation.     Left ventricle is normal in size and function.  Mild concentric left ventricle hypertrophy.  Mild tricuspid regurgitation with mildly elevated RVSP.  No comparison study.    Physical Exam    Airway    Mallampati score: II  TM Distance: >3 FB  Neck ROM: full     Dental   No notable dental hx     Cardiovascular  Cardiovascular exam normal    Pulmonary  Pulmonary exam normal     Other Findings  post-pubertal.  Normal sinus rhythm  Normal ECG  No previous ECGs available      Hb 10.2    Anesthesia Plan  ASA Score- 3     Anesthesia Type- IV sedation with anesthesia with ASA Monitors.         Additional Monitors:     Airway Plan:            Plan Factors-Exercise tolerance (METS): >4 METS.    Chart reviewed. EKG reviewed. Imaging results reviewed. Existing labs reviewed. Patient summary reviewed.    Patient is not a current smoker.      Obstructive sleep apnea risk education given perioperatively.        Induction- intravenous.    Postoperative Plan-     Perioperative Resuscitation Plan - Level 1 - Full Code.       Informed Consent- Anesthetic plan and risks discussed with patient.  I personally reviewed this patient with the CRNA. Discussed and agreed on the Anesthesia Plan with the CRNA..

## 2024-05-16 NOTE — CONSULTS
Procedure(s):  Colonoscopy with indication(s) of Anemia  Esophagogastroduodenuoscopy with the indication(s) of Anemia    Endoscopy Pre-Procedure Assessment:  Prior to the procedure, the patient is identified.  The patient's history, medications, and allergies have been reviewed.  The patient is competent.  The risks and benefits of the procedure procedure and the planned sedation have been discussed with the patient.  All questions have been answered and informed consent for the procedure has been obtained.    Vitals:    05/16/24 1427   BP: 167/72   Pulse: 88   Resp: 18   Temp: 98.4 °F (36.9 °C)   SpO2: 98%       Physical Exam:  Physical Exam  Eyes:      Conjunctiva/sclera: Conjunctivae normal.   Cardiovascular:      Rate and Rhythm: Normal rate.   Pulmonary:      Effort: Pulmonary effort is normal.   Abdominal:      Palpations: Abdomen is soft.   Neurological:      General: No focal deficit present.      Mental Status: She is alert.   Psychiatric:         Mood and Affect: Mood normal.                Consent:    We have discussed the procedure in detail. We reviewed risks, benefits and alternative as well as protentional complications including and not limited to medication side effect , infection, bleeding, perforation and the potential need for surgery, ICU admission, CPR, as well as the need for blood product transfusion. Patient verbalized understanding and agreement. All patient questions were answered.     After reviewed the risks and benefits, the patient is deemed in satisfactory condition to undergo the procedure.  The anesthesia plan is to use monitored anesthesia care (MAC).      05/16/24

## 2024-05-16 NOTE — PLAN OF CARE
Problem: PAIN - ADULT  Goal: Verbalizes/displays adequate comfort level or baseline comfort level  Description: Interventions:  - Encourage patient to monitor pain and request assistance  - Assess pain using appropriate pain scale  - Administer analgesics based on type and severity of pain and evaluate response  - Implement non-pharmacological measures as appropriate and evaluate response  - Consider cultural and social influences on pain and pain management  - Notify physician/advanced practitioner if interventions unsuccessful or patient reports new pain  Outcome: Progressing     Problem: INFECTION - ADULT  Goal: Absence or prevention of progression during hospitalization  Description: INTERVENTIONS:  - Assess and monitor for signs and symptoms of infection  - Monitor lab/diagnostic results  - Monitor all insertion sites, i.e. indwelling lines, tubes, and drains  - Monitor endotracheal if appropriate and nasal secretions for changes in amount and color  - [place] appropriate cooling/warming therapies per order  - Administer medications as ordered  - Instruct and encourage patient and family to use good hand hygiene technique  - Identify and instruct in appropriate isolation precautions for identified infection/condition  Outcome: Progressing  Goal: Absence of fever/infection during neutropenic period  Description: INTERVENTIONS:  - Monitor WBC    Outcome: Progressing     Problem: SAFETY ADULT  Goal: Patient will remain free of falls  Description: INTERVENTIONS:  - Educate patient/family on patient safety including physical limitations  - Instruct patient to call for assistance with activity   - Consult OT/PT to assist with strengthening/mobility   - Keep Call bell within reach  - Keep bed low and locked with side rails adjusted as appropriate  - Keep care items and personal belongings within reach  - Initiate and maintain comfort rounds  - Make Fall Risk Sign visible to staff  - Offer Toileting every 2 Hours,  in advance of need  - Initiate/Maintain alarm  - Obtain necessary fall risk management equipment:   - Apply yellow socks and bracelet for high fall risk patients  - Consider moving patient to room near nurses station  Outcome: Progressing  Goal: Maintain or return to baseline ADL function  Description: INTERVENTIONS:  -  Assess patient's ability to carry out ADLs; assess patient's baseline for ADL function and identify physical deficits which impact ability to perform ADLs (bathing, care of mouth/teeth, toileting, grooming, dressing, etc.)  - Assess/evaluate cause of self-care deficits   - Assess range of motion  - Assess patient's mobility; develop plan if impaired  - Assess patient's need for assistive devices and provide as appropriate  - Encourage maximum independence but intervene and supervise when necessary  - Involve family in performance of ADLs  - Assess for home care needs following discharge   - Consider OT consult to assist with ADL evaluation and planning for discharge  - Provide patient education as appropriate  Outcome: Progressing  Goal: Maintains/Returns to pre admission functional level  Description: INTERVENTIONS:  - Perform AM-PAC 6 Click Basic Mobility/ Daily Activity assessment daily.  - Set and communicate daily mobility goal to care team and patient/family/caregiver.   - Collaborate with rehabilitation services on mobility goals if consulted  - Perform Range of Motion 3 times a day.  - Reposition patient every 2 hours.  - Dangle patient 3 times a day  - Stand patient 3 times a day  - Ambulate patient 3 times a day  - Out of bed to chair 3 times a day   - Out of bed for meals 3 times a day  - Out of bed for toileting  - Record patient progress and toleration of activity level   Outcome: Progressing     Problem: DISCHARGE PLANNING  Goal: Discharge to home or other facility with appropriate resources  Description: INTERVENTIONS:  - Identify barriers to discharge w/patient and caregiver  -  Arrange for needed discharge resources and transportation as appropriate  - Identify discharge learning needs (meds, wound care, etc.)  - Arrange for interpretive services to assist at discharge as needed  - Refer to Case Management Department for coordinating discharge planning if the patient needs post-hospital services based on physician/advanced practitioner order or complex needs related to functional status, cognitive ability, or social support system  Outcome: Progressing     Problem: Knowledge Deficit  Goal: Patient/family/caregiver demonstrates understanding of disease process, treatment plan, medications, and discharge instructions  Description: Complete learning assessment and assess knowledge base.  Interventions:  - Provide teaching at level of understanding  - Provide teaching via preferred learning methods  Outcome: Progressing     Problem: Nutrition/Hydration-ADULT  Goal: Nutrient/Hydration intake appropriate for improving, restoring or maintaining nutritional needs  Description: Monitor and assess patient's nutrition/hydration status for malnutrition. Collaborate with interdisciplinary team and initiate plan and interventions as ordered.  Monitor patient's weight and dietary intake as ordered or per policy. Utilize nutrition screening tool and intervene as necessary. Determine patient's food preferences and provide high-protein, high-caloric foods as appropriate.     INTERVENTIONS:  - Monitor oral intake, urinary output, labs, and treatment plans  - Assess nutrition and hydration status and recommend course of action  - Evaluate amount of meals eaten  - Assist patient with eating if necessary   - Allow adequate time for meals  - Recommend/ encourage appropriate diets, oral nutritional supplements, and vitamin/mineral supplements  - Order, calculate, and assess calorie counts as needed  - Recommend, monitor, and adjust tube feedings and TPN/PPN based on assessed needs  - Assess need for intravenous  fluids  - Provide specific nutrition/hydration education as appropriate  - Include patient/family/caregiver in decisions related to nutrition  Outcome: Progressing     Problem: CARDIOVASCULAR - ADULT  Goal: Maintains optimal cardiac output and hemodynamic stability  Description: INTERVENTIONS:  - Monitor I/O, vital signs and rhythm  - Monitor for S/S and trends of decreased cardiac output  - Administer and titrate ordered vasoactive medications to optimize hemodynamic stability  - Assess quality of pulses, skin color and temperature  - Assess for signs of decreased coronary artery perfusion  - Instruct patient to report change in severity of symptoms  Outcome: Progressing  Goal: Absence of cardiac dysrhythmias or at baseline rhythm  Description: INTERVENTIONS:  - Continuous cardiac monitoring, vital signs, obtain 12 lead EKG if ordered  - Administer antiarrhythmic and heart rate control medications as ordered  - Monitor electrolytes and administer replacement therapy as ordered  Outcome: Progressing     Problem: GASTROINTESTINAL - ADULT  Goal: Minimal or absence of nausea and/or vomiting  Description: INTERVENTIONS:  - Administer IV fluids if ordered to ensure adequate hydration  - Maintain NPO status until nausea and vomiting are resolved  - Nasogastric tube if ordered  - Administer ordered antiemetic medications as needed  - Provide nonpharmacologic comfort measures as appropriate  - Advance diet as tolerated, if ordered  - Consider nutrition services referral to assist patient with adequate nutrition and appropriate food choices  Outcome: Progressing  Goal: Maintains or returns to baseline bowel function  Description: INTERVENTIONS:  - Assess bowel function  - Encourage oral fluids to ensure adequate hydration  - Administer IV fluids if ordered to ensure adequate hydration  - Administer ordered medications as needed  - Encourage mobilization and activity  - Consider nutritional services referral to assist patient  with adequate nutrition and appropriate food choices  Outcome: Progressing  Goal: Maintains adequate nutritional intake  Description: INTERVENTIONS:  - Monitor percentage of each meal consumed  - Identify factors contributing to decreased intake, treat as appropriate  - Assist with meals as needed  - Monitor I&O, weight, and lab values if indicated  - Obtain nutrition services referral as needed  Outcome: Progressing  Goal: Establish and maintain optimal ostomy function  Description: INTERVENTIONS:  - Assess bowel function  - Encourage oral fluids to ensure adequate hydration  - Administer IV fluids if ordered to ensure adequate hydration   - Administer ordered medications as needed  - Encourage mobilization and activity  - Nutrition services referral to assist patient with appropriate food choices  - Assess stoma site  - Consider wound care consult   Outcome: Progressing  Goal: Oral mucous membranes remain intact  Description: INTERVENTIONS  - Assess oral mucosa and hygiene practices  - Implement preventative oral hygiene regimen  - Implement oral medicated treatments as ordered  - Initiate Nutrition services referral as needed  Outcome: Progressing     Problem: METABOLIC, FLUID AND ELECTROLYTES - ADULT  Goal: Electrolytes maintained within normal limits  Description: INTERVENTIONS:  - Monitor labs and assess patient for signs and symptoms of electrolyte imbalances  - Administer electrolyte replacement as ordered  - Monitor response to electrolyte replacements, including repeat lab results as appropriate  - Instruct patient on fluid and nutrition as appropriate  Outcome: Progressing  Goal: Fluid balance maintained  Description: INTERVENTIONS:  - Monitor labs   - Monitor I/O and WT  - Instruct patient on fluid and nutrition as appropriate  - Assess for signs & symptoms of volume excess or deficit  Outcome: Progressing  Goal: Glucose maintained within target range  Description: INTERVENTIONS:  - Monitor Blood Glucose  as ordered  - Assess for signs and symptoms of hyperglycemia and hypoglycemia  - Administer ordered medications to maintain glucose within target range  - Assess nutritional intake and initiate nutrition service referral as needed  Outcome: Progressing     Problem: HEMATOLOGIC - ADULT  Goal: Maintains hematologic stability  Description: INTERVENTIONS  - Assess for signs and symptoms of bleeding or hemorrhage  - Monitor labs  - Administer supportive blood products/factors as ordered and appropriate  Outcome: Progressing     Problem: Neurological Deficit  Goal: Neurological status is stable or improving  Description: Interventions:  - Monitor and assess patient's level of consciousness, motor function, sensory function, and level of assistance needed for ADLs.   - Monitor and report changes from baseline. Collaborate with interdisciplinary team to initiate plan and implement interventions as ordered.   - Provide and maintain a safe environment.  - Consider seizure precautions.  - Consider fall precautions.  - Consider aspiration precautions.  - Consider bleeding precautions.  Outcome: Progressing     Problem: Activity Intolerance/Impaired Mobility  Goal: Mobility/activity is maintained at optimum level for patient  Description: Interventions:  - Assess and monitor patient  barriers to mobility and need for assistive/adaptive devices.  - Assess patient's emotional response to limitations.  - Collaborate with interdisciplinary team and initiate plans and interventions as ordered.  - Encourage independent activity per ability.  - Maintain proper body alignment.  - Perform active/passive rom as tolerated/ordered.  - Plan activities to conserve energy.  - Turn patient as appropriate  Outcome: Progressing     Problem: Potential for Aspiration  Goal: Non-ventilated patient's risk of aspiration is minimized  Description: Assess and monitor vital signs, respiratory status, and labs (WBC).  Monitor for signs of aspiration  (tachypnea, cough, rales, wheezing, cyanosis, fever).    - Assess and monitor patient's ability to swallow.  - Place patient up in chair to eat if possible.  - HOB up at 90 degrees to eat if unable to get patient up into chair.  - Supervise patient during oral intake.   - Instruct patient/ family to take small bites.  - Instruct patient/ family to take small single sips when taking liquids.  - Follow patient-specific strategies generated by speech pathologist.  Outcome: Progressing

## 2024-05-16 NOTE — ANESTHESIA PREPROCEDURE EVALUATION
Procedure:  PRE-OP ONLY    Relevant Problems   HEMATOLOGY   (+) Acute anemia      NEURO/PSYCH   (+) Acute CVA (cerebrovascular accident) (HCC)        Left Ventricle: Left ventricle is normal in size and function.  Estimated LVEF is 70%.  Mild concentric left ventricular hypertrophy.  No regional wall motion abnormality. Diastolic function is mildly abnormal, consistent with grade I (abnormal) relaxation.     Left ventricle is normal in size and function.  Mild concentric left ventricle hypertrophy.  Mild tricuspid regurgitation with mildly elevated RVSP.  No comparison study.    Physical Exam    Airway    Mallampati score: II  TM Distance: >3 FB  Neck ROM: full     Dental   No notable dental hx     Cardiovascular  Cardiovascular exam normal    Pulmonary  Pulmonary exam normal     Other Findings  post-pubertal.  Normal sinus rhythm  Normal ECG  No previous ECGs available      Hb 10.2    Anesthesia Plan  ASA Score- 3     Anesthesia Type- IV sedation with anesthesia with ASA Monitors.         Additional Monitors:     Airway Plan:            Plan Factors-Exercise tolerance (METS): >4 METS.    Chart reviewed. EKG reviewed. Imaging results reviewed. Existing labs reviewed. Patient summary reviewed.    Patient is not a current smoker.      Obstructive sleep apnea risk education given perioperatively.        Induction- intravenous.    Postoperative Plan-     Perioperative Resuscitation Plan - Level 1 - Full Code.       Informed Consent- Anesthetic plan and risks discussed with patient.  I personally reviewed this patient with the CRNA. Discussed and agreed on the Anesthesia Plan with the CRNA..

## 2024-05-16 NOTE — OCCUPATIONAL THERAPY NOTE
Occupational Therapy Progress Note     Patient Name: Agatha Villeda  Today's Date: 5/16/2024  Problem List  Principal Problem:    Acute CVA (cerebrovascular accident) ([facility])  Active Problems:    Acute anemia    Erythema of upper extremity     05/16/24 0953   Note Type   Note Type Treatment   Pain Assessment   Pain Assessment Tool 0-10   Pain Score No Pain   Restrictions/Precautions   Other Precautions Chair Alarm;Bed Alarm;Fall Risk;Multiple lines   ADL   Grooming Assistance 5  Supervision/Setup   Grooming Deficit Teeth care;Brushing hair   Grooming Comments Pt completed oral care and hair brushing while standing at sink with SUP for safety.   UB Bathing Assistance 5  Supervision/Setup   UB Bathing Deficit Setup;Chest;Right arm;Left arm;Abdomen   UB Bathing Comments Pt completing UB bathing while sitting EOB with setup only.   LB Bathing Assistance 5  Supervision/Setup   LB Bathing Deficit Supervision/safety;Perineal area;Buttocks;Right upper leg;Left upper leg;Left lower leg including foot;Right lower leg including foot   LB Bathing Comments Pt completed LB bathing with SUP for safety while standing. Support from RW required.   UB Dressing Assistance 5  Supervision/Setup   UB Dressing Deficit Setup   UB Dressing Comments Pt able to don hospital gown with setup only.   LB Dressing Assistance 5  Supervision/Setup   LB Dressing Deficit Setup;Supervision/safety;Increased time to complete;Don/doff R sock;Don/doff L sock   LB Dressing Comments Pt able to don socks while sitting EOB with SUP for safety during distal reach.   Bed Mobility   Supine to Sit 6  Modified independent   Additional items HOB elevated;Increased time required   Additional Comments Pt completed supine to sit bed mobility with MI with HOB elevated.   Transfers   Sit to Stand 5  Supervision   Additional items Verbal cues   Stand to Sit 5  Supervision   Additional items Verbal cues   Stand pivot 5  Supervision   Additional items Verbal cues    Additional Comments Pt completed all functional transfers using RW with SUP and cuing for safe hand placement.   Functional Mobility   Functional Mobility 5  Supervision   Additional Comments Pt completed functinal mobility to/from bathroom using RW with SUP. Cuing for safe RW management.   Additional items Rolling walker   Cognition   Overall Cognitive Status WFL   Arousal/Participation Alert;Responsive;Cooperative   Attention Within functional limits   Orientation Level Oriented X4   Memory Within functional limits   Following Commands Follows all commands and directions without difficulty   Activity Tolerance   Activity Tolerance Patient tolerated treatment well   Assessment   Assessment Pt seen for OT treatment day 2. Pt willing and able to participate in treatment focused on bed mobility, functional transfers, mobility and ADLs. Pt completed bed mobility with MI. Functional transfers completed using RW with SUP. UB ADLs completed with setup only and LB ADLs with SUP. Pt requires additional OT services to decrease risk for deconditioning and falls. Recommendation remains no further OT needs following dc. Pt sitting in recliner with chair alarm activated, call bell placed within reach and all needs met following session.   Plan   Treatment Interventions ADL retraining;Functional transfer training;UE strengthening/ROM;Endurance training;Patient/family training;Neuromuscular reeducation;Energy conservation;Activityengagement   Goal Expiration Date 05/26/24   OT Treatment Day 2   OT Frequency 1-2x/wk   Discharge Recommendation   Rehab Resource Intensity Level, OT No post-acute rehabilitation needs   AM-PAC Daily Activity Inpatient   Lower Body Dressing 3   Bathing 3   Toileting 3   Upper Body Dressing 3   Grooming 4   Eating 4   Daily Activity Raw Score 20   Daily Activity Standardized Score (Calc for Raw Score >=11) 42.03   AM-PAC Applied Cognition Inpatient   Following a Speech/Presentation 4   Understanding  Ordinary Conversation 4   Taking Medications 4   Remembering Where Things Are Placed or Put Away 4   Remembering List of 4-5 Errands 4   Taking Care of Complicated Tasks 3   Applied Cognition Raw Score 23   Applied Cognition Standardized Score 53.08     Pt goals to be met by 5/26/2024     Pt will demonstrate ability to complete grooming/hygiene tasks @ I after set-up.  Pt will demonstrate ability to complete supine<>sit @ I in order to increase safety and independence during ADL tasks.  Pt will demonstrate ability to complete UB ADLs including washing/dressing @ I in order to increase performance and participation during meaningful tasks  Pt will demonstrate ability to complete LB dressing @ I in order to increase safety and independence during meaningful tasks.   Pt will demonstrate ability to kody/doff socks/shoes while sitting EOB @ I in order to increase safety and independence during meaningful tasks.   Pt will demonstrate ability to complete toileting tasks including CM and pericare @ I in order to increase safety and independence during meaningful tasks.  Pt will demonstrate ability to complete EOB, chair, toilet/commode transfers @ I in order to increase performance and participation during functional tasks.  Pt will demonstrate ability to stand for 8-10 minutes while maintaining Good balance without the use of AD during ADL tasks.  Pt will demonstrate ability to tolerate 30-35 minute OT session with no vc'ing for deep breathing or use of energy conservation techniques in order to increase activity tolerance during functional tasks.   Pt will demonstrate Good carryover of use of energy conservation/compensatory strategies during ADLs and functional tasks in order to increase safety and reduce risk for falls.   Pt will demonstrate Good attention and participation in continued evaluation of functional ambulation house hold distances in order to assist with safe d/c planning.  Pt will attend to continued  cognitive assessments 100% of the time in order to provide most appropriate d/c recommendations.   Pt will follow 100% simple 2-step commands and be A&O x4 consistently with environmental cues to increase participation in functional activities.   Pt will identify 3 areas of interest/hobbies and 1 intervention on how to incorporate into daily life in order to increase interaction with environment and peers as well as increase participation in meaningful tasks.   Pt will demonstrate 100% carryover of BUE HEP in order to increase BUE MS and increase performance during functional tasks upon d/c home.    Malinda Villela, OTR/L

## 2024-05-16 NOTE — PROGRESS NOTES
Pastoral Care Progress Note    2024  Patient: Agatha Villeda : 1945  Admission Date & Time: 5/10/2024 09  MRN: [id number] CSN: [id number]    Fr Garnica provided prayer at pt request.

## 2024-05-16 NOTE — PLAN OF CARE
Problem: PAIN - ADULT  Goal: Verbalizes/displays adequate comfort level or baseline comfort level  Description: Interventions:  - Encourage patient to monitor pain and request assistance  - Assess pain using appropriate pain scale  - Administer analgesics based on type and severity of pain and evaluate response  - Implement non-pharmacological measures as appropriate and evaluate response  - Consider cultural and social influences on pain and pain management  - Notify physician/advanced practitioner if interventions unsuccessful or patient reports new pain  Outcome: Progressing     Problem: INFECTION - ADULT  Goal: Absence or prevention of progression during hospitalization  Description: INTERVENTIONS:  - Assess and monitor for signs and symptoms of infection  - Monitor lab/diagnostic results  - Monitor all insertion sites, i.e. indwelling lines, tubes, and drains  - Monitor endotracheal if appropriate and nasal secretions for changes in amount and color  - [place] appropriate cooling/warming therapies per order  - Administer medications as ordered  - Instruct and encourage patient and family to use good hand hygiene technique  - Identify and instruct in appropriate isolation precautions for identified infection/condition  Outcome: Progressing  Goal: Absence of fever/infection during neutropenic period  Description: INTERVENTIONS:  - Monitor WBC    Outcome: Progressing     Problem: SAFETY ADULT  Goal: Patient will remain free of falls  Description: INTERVENTIONS:  - Educate patient/family on patient safety including physical limitations  - Instruct patient to call for assistance with activity   - Consult OT/PT to assist with strengthening/mobility   - Keep Call bell within reach  - Keep bed low and locked with side rails adjusted as appropriate  - Keep care items and personal belongings within reach  - Initiate and maintain comfort rounds  - Make Fall Risk Sign visible to staff  - Offer Toileting every 2 Hours,  in advance of need  - Initiate/Maintain alarm  - Obtain necessary fall risk management equipment: walker   - Apply yellow socks and bracelet for high fall risk patients  - Consider moving patient to room near nurses station  Outcome: Progressing  Goal: Maintain or return to baseline ADL function  Description: INTERVENTIONS:  -  Assess patient's ability to carry out ADLs; assess patient's baseline for ADL function and identify physical deficits which impact ability to perform ADLs (bathing, care of mouth/teeth, toileting, grooming, dressing, etc.)  - Assess/evaluate cause of self-care deficits   - Assess range of motion  - Assess patient's mobility; develop plan if impaired  - Assess patient's need for assistive devices and provide as appropriate  - Encourage maximum independence but intervene and supervise when necessary  - Involve family in performance of ADLs  - Assess for home care needs following discharge   - Consider OT consult to assist with ADL evaluation and planning for discharge  - Provide patient education as appropriate  Outcome: Progressing  Goal: Maintains/Returns to pre admission functional level  Description: INTERVENTIONS:  - Perform AM-PAC 6 Click Basic Mobility/ Daily Activity assessment daily.  - Set and communicate daily mobility goal to care team and patient/family/caregiver.   - Collaborate with rehabilitation services on mobility goals if consulted  - Ambulate patient 3 times a day  - Out of bed to chair 3 times a day   - Out of bed for meals 3 times a day  - Out of bed for toileting  - Record patient progress and toleration of activity level   Outcome: Progressing     Problem: DISCHARGE PLANNING  Goal: Discharge to home or other facility with appropriate resources  Description: INTERVENTIONS:  - Identify barriers to discharge w/patient and caregiver  - Arrange for needed discharge resources and transportation as appropriate  - Identify discharge learning needs (meds, wound care, etc.)  -  Arrange for interpretive services to assist at discharge as needed  - Refer to Case Management Department for coordinating discharge planning if the patient needs post-hospital services based on physician/advanced practitioner order or complex needs related to functional status, cognitive ability, or social support system  Outcome: Progressing     Problem: Knowledge Deficit  Goal: Patient/family/caregiver demonstrates understanding of disease process, treatment plan, medications, and discharge instructions  Description: Complete learning assessment and assess knowledge base.  Interventions:  - Provide teaching at level of understanding  - Provide teaching via preferred learning methods  Outcome: Progressing     Problem: Nutrition/Hydration-ADULT  Goal: Nutrient/Hydration intake appropriate for improving, restoring or maintaining nutritional needs  Description: Monitor and assess patient's nutrition/hydration status for malnutrition. Collaborate with interdisciplinary team and initiate plan and interventions as ordered.  Monitor patient's weight and dietary intake as ordered or per policy. Utilize nutrition screening tool and intervene as necessary. Determine patient's food preferences and provide high-protein, high-caloric foods as appropriate.     INTERVENTIONS:  - Monitor oral intake, urinary output, labs, and treatment plans  - Assess nutrition and hydration status and recommend course of action  - Evaluate amount of meals eaten  - Assist patient with eating if necessary   - Allow adequate time for meals  - Recommend/ encourage appropriate diets, oral nutritional supplements, and vitamin/mineral supplements  - Order, calculate, and assess calorie counts as needed  - Recommend, monitor, and adjust tube feedings and TPN/PPN based on assessed needs  - Assess need for intravenous fluids  - Provide specific nutrition/hydration education as appropriate  - Include patient/family/caregiver in decisions related to  nutrition  Outcome: Progressing     Problem: CARDIOVASCULAR - ADULT  Goal: Maintains optimal cardiac output and hemodynamic stability  Description: INTERVENTIONS:  - Monitor I/O, vital signs and rhythm  - Monitor for S/S and trends of decreased cardiac output  - Administer and titrate ordered vasoactive medications to optimize hemodynamic stability  - Assess quality of pulses, skin color and temperature  - Assess for signs of decreased coronary artery perfusion  - Instruct patient to report change in severity of symptoms  Outcome: Progressing  Goal: Absence of cardiac dysrhythmias or at baseline rhythm  Description: INTERVENTIONS:  - Continuous cardiac monitoring, vital signs, obtain 12 lead EKG if ordered  - Administer antiarrhythmic and heart rate control medications as ordered  - Monitor electrolytes and administer replacement therapy as ordered  Outcome: Progressing     Problem: GASTROINTESTINAL - ADULT  Goal: Minimal or absence of nausea and/or vomiting  Description: INTERVENTIONS:  - Administer IV fluids if ordered to ensure adequate hydration  - Maintain NPO status until nausea and vomiting are resolved  - Nasogastric tube if ordered  - Administer ordered antiemetic medications as needed  - Provide nonpharmacologic comfort measures as appropriate  - Advance diet as tolerated, if ordered  - Consider nutrition services referral to assist patient with adequate nutrition and appropriate food choices  Outcome: Progressing  Goal: Maintains or returns to baseline bowel function  Description: INTERVENTIONS:  - Assess bowel function  - Encourage oral fluids to ensure adequate hydration  - Administer IV fluids if ordered to ensure adequate hydration  - Administer ordered medications as needed  - Encourage mobilization and activity  - Consider nutritional services referral to assist patient with adequate nutrition and appropriate food choices  Outcome: Progressing  Goal: Maintains adequate nutritional  intake  Description: INTERVENTIONS:  - Monitor percentage of each meal consumed  - Identify factors contributing to decreased intake, treat as appropriate  - Assist with meals as needed  - Monitor I&O, weight, and lab values if indicated  - Obtain nutrition services referral as needed  Outcome: Progressing  Goal: Establish and maintain optimal ostomy function  Description: INTERVENTIONS:  - Assess bowel function  - Encourage oral fluids to ensure adequate hydration  - Administer IV fluids if ordered to ensure adequate hydration   - Administer ordered medications as needed  - Encourage mobilization and activity  - Nutrition services referral to assist patient with appropriate food choices  - Assess stoma site  - Consider wound care consult   Outcome: Progressing  Goal: Oral mucous membranes remain intact  Description: INTERVENTIONS  - Assess oral mucosa and hygiene practices  - Implement preventative oral hygiene regimen  - Implement oral medicated treatments as ordered  - Initiate Nutrition services referral as needed  Outcome: Progressing

## 2024-05-16 NOTE — ANESTHESIA POSTPROCEDURE EVALUATION
Post-Op Assessment Note    CV Status:  Stable    Pain management: adequate       Mental Status:  Sleepy   Hydration Status:  Euvolemic   PONV Controlled:  Controlled   Airway Patency:  Patent     Post Op Vitals Reviewed: Yes    No anethesia notable event occurred.    Staff: KADE               /54 (05/16/24 1553)    Temp 97.9 °F (36.6 °C) (05/16/24 1553)    Pulse 73 (05/16/24 1553)   Resp 16 (05/16/24 1553)    SpO2 99 % (05/16/24 1553)

## 2024-05-16 NOTE — ASSESSMENT & PLAN NOTE
Was contacted by nursing that patient had RUE redness and pain at the area of prior IV site in the right AC. Patient seen and examined. Mild erythema of Right AC area noted with mild tenderness.   Patient is afebrile but does have mild leukocytosis with WBCs of 12. S/p 4 doses  Area of arm is slightly erythematous but does not appear to be infectious - will d/c abx for now 5/16  VAS upper limb venous duplex   R: No evidence of acute or chronic DVT.  Evidence of acute occlusive superficial thrombophlebitis noted in cephalic vein from the antecubital fossa to mid upper arm   L: No evidence of thrombus in the internal jugular vein, subclavian vein, and innominate vein  Continue with supportive care, elevation, and heat

## 2024-05-16 NOTE — PLAN OF CARE
Problem: OCCUPATIONAL THERAPY ADULT  Goal: Performs self-care activities at highest level of function for planned discharge setting.  See evaluation for individualized goals.  Description: Treatment Interventions: ADL retraining, Visual perceptual retraining, Functional transfer training, UE strengthening/ROM, Endurance training, Cognitive reorientation, Patient/family training, Equipment evaluation/education, Neuromuscular reeducation, Fine motor coordination activities, Compensatory technique education, UE splinting, Continued evaluation, Cardiac education, Energy conservation, Activityengagement          See flowsheet documentation for full assessment, interventions and recommendations.   Outcome: Progressing  Note: Limitation: Decreased high-level ADLs, Decreased endurance  Prognosis: Good  Assessment: Pt seen for OT treatment day 2. Pt willing and able to participate in treatment focused on bed mobility, functional transfers, mobility and ADLs. Pt completed bed mobility with MI. Functional transfers completed using RW with SUP. UB ADLs completed with setup only and LB ADLs with SUP. Pt requires additional OT services to decrease risk for deconditioning and falls. Recommendation remains no further OT needs following dc. Pt sitting in recliner with chair alarm activated, call bell placed within reach and all needs met following session.     Rehab Resource Intensity Level, OT: No post-acute rehabilitation needs

## 2024-05-16 NOTE — ASSESSMENT & PLAN NOTE
"Presented with R leg weakness that began 3 days prior to admission   CTA head/neck revealed:  \"CT head: -Hypodensities within the left parietal lobe and left cerebellar hemisphere may represent age-indeterminate infarct. Recommend MRI of the brain for further evaluation. -Chronic microangiopathic changes.  CTA head: -No large vessel occlusion. Question moderate to severe right and moderate left supraclinoid stenosis, possibly exaggerated due to motion. -Focal moderate to severe stenosis involving the proximal right P2 segment of the PCA.  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 - Approximately 50% left and less than 50% right stenosis of the proximal internal carotid arteries due to atherosclerosis.  Other: -Spondylosis of the cervical spine including grade 1 listhesis as above. -Mediastinal and bilateral hilar adenopathy, nonspecific. Clinical correlation and dedicated chest CT advised.\"  MRI revealed, \"1.  Multifocal acute/subacute infarcts involving bilateral frontoparietal and occipital lobes as well as left cerebellum suggesting embolic etiology. There is focal petechial hemorrhage within the left parietal lobe infarct. No mass effect. 2.  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 Chronic microangiopathic change. 3.  Right mastoid effusion. Correlate for mastoiditis.\"  Neurology consult appreciated - on aspirin 81mg daily, Plavix 75mg daily, Lipitor 40mg daily. Will need Loop recorder as outpatient   PT/OT - will discharge home with referral for outpt PT  Ambulatory referral for vascular surgery on discharge   "

## 2024-05-17 VITALS
DIASTOLIC BLOOD PRESSURE: 75 MMHG | OXYGEN SATURATION: 98 % | BODY MASS INDEX: 23.22 KG/M2 | HEART RATE: 98 BPM | WEIGHT: 123 LBS | HEIGHT: 61 IN | SYSTOLIC BLOOD PRESSURE: 122 MMHG | TEMPERATURE: 97.5 F | RESPIRATION RATE: 18 BRPM

## 2024-05-17 LAB
ANION GAP SERPL CALCULATED.3IONS-SCNC: 6 MMOL/L (ref 4–13)
BUN SERPL-MCNC: 14 MG/DL (ref 5–25)
CALCIUM SERPL-MCNC: 9.4 MG/DL (ref 8.4–10.2)
CHLORIDE SERPL-SCNC: 104 MMOL/L (ref 96–108)
CO2 SERPL-SCNC: 27 MMOL/L (ref 21–32)
CREAT SERPL-MCNC: 0.7 MG/DL (ref 0.6–1.3)
ERYTHROCYTE [DISTWIDTH] IN BLOOD BY AUTOMATED COUNT: 23.9 % (ref 11.6–15.1)
GFR SERPL CREATININE-BSD FRML MDRD: 82 ML/MIN/1.73SQ M
GLUCOSE SERPL-MCNC: 87 MG/DL (ref 65–140)
HCT VFR BLD AUTO: 35.1 % (ref 34.8–46.1)
HGB BLD-MCNC: 10.2 G/DL (ref 11.5–15.4)
MCH RBC QN AUTO: 22.5 PG (ref 26.8–34.3)
MCHC RBC AUTO-ENTMCNC: 29.1 G/DL (ref 31.4–37.4)
MCV RBC AUTO: 77 FL (ref 82–98)
PLATELET # BLD AUTO: 492 THOUSANDS/UL (ref 149–390)
PMV BLD AUTO: 9 FL (ref 8.9–12.7)
POTASSIUM SERPL-SCNC: 3.7 MMOL/L (ref 3.5–5.3)
RBC # BLD AUTO: 4.54 MILLION/UL (ref 3.81–5.12)
SODIUM SERPL-SCNC: 137 MMOL/L (ref 135–147)
WBC # BLD AUTO: 7.95 THOUSAND/UL (ref 4.31–10.16)

## 2024-05-17 PROCEDURE — 97116 GAIT TRAINING THERAPY: CPT

## 2024-05-17 PROCEDURE — 80048 BASIC METABOLIC PNL TOTAL CA: CPT

## 2024-05-17 PROCEDURE — 99239 HOSP IP/OBS DSCHRG MGMT >30: CPT | Performed by: INTERNAL MEDICINE

## 2024-05-17 PROCEDURE — 97112 NEUROMUSCULAR REEDUCATION: CPT

## 2024-05-17 PROCEDURE — 85027 COMPLETE CBC AUTOMATED: CPT

## 2024-05-17 RX ORDER — ACETAMINOPHEN 325 MG/1
650 TABLET ORAL EVERY 4 HOURS PRN
Qty: 30 TABLET | Refills: 0 | Status: SHIPPED | OUTPATIENT
Start: 2024-05-17

## 2024-05-17 RX ORDER — AMLODIPINE BESYLATE 5 MG/1
5 TABLET ORAL DAILY
Qty: 30 TABLET | Refills: 0 | Status: SHIPPED | OUTPATIENT
Start: 2024-05-18

## 2024-05-17 RX ORDER — POLYETHYLENE GLYCOL 3350 17 G/17G
17 POWDER, FOR SOLUTION ORAL DAILY PRN
Qty: 10 EACH | Refills: 0 | Status: SHIPPED | OUTPATIENT
Start: 2024-05-17

## 2024-05-17 RX ORDER — SUCRALFATE 1 G/1
1 TABLET ORAL
Qty: 120 TABLET | Refills: 0 | Status: SHIPPED | OUTPATIENT
Start: 2024-05-17

## 2024-05-17 RX ORDER — ASPIRIN 81 MG/1
81 TABLET, CHEWABLE ORAL DAILY
Qty: 30 TABLET | Refills: 0 | Status: SHIPPED | OUTPATIENT
Start: 2024-05-18

## 2024-05-17 RX ORDER — CLOPIDOGREL BISULFATE 75 MG/1
75 TABLET ORAL DAILY
Qty: 30 TABLET | Refills: 0 | Status: SHIPPED | OUTPATIENT
Start: 2024-05-18

## 2024-05-17 RX ORDER — ATORVASTATIN CALCIUM 40 MG/1
40 TABLET, FILM COATED ORAL EVERY EVENING
Qty: 30 TABLET | Refills: 0 | Status: SHIPPED | OUTPATIENT
Start: 2024-05-17

## 2024-05-17 RX ORDER — SUCRALFATE 1 G/1
1 TABLET ORAL
Status: DISCONTINUED | OUTPATIENT
Start: 2024-05-17 | End: 2024-05-17 | Stop reason: HOSPADM

## 2024-05-17 RX ORDER — PANTOPRAZOLE SODIUM 40 MG/1
40 TABLET, DELAYED RELEASE ORAL
Qty: 30 TABLET | Refills: 0 | Status: SHIPPED | OUTPATIENT
Start: 2024-05-18

## 2024-05-17 RX ORDER — PANTOPRAZOLE SODIUM 40 MG/1
40 TABLET, DELAYED RELEASE ORAL
Status: DISCONTINUED | OUTPATIENT
Start: 2024-05-17 | End: 2024-05-17 | Stop reason: HOSPADM

## 2024-05-17 RX ADMIN — SUCRALFATE 1 G: 1 TABLET ORAL at 10:55

## 2024-05-17 RX ADMIN — ASPIRIN 81 MG CHEWABLE TABLET 81 MG: 81 TABLET CHEWABLE at 09:20

## 2024-05-17 RX ADMIN — CLOPIDOGREL 75 MG: 75 TABLET ORAL at 09:20

## 2024-05-17 RX ADMIN — HEPARIN SODIUM 5000 UNITS: 5000 INJECTION INTRAVENOUS; SUBCUTANEOUS at 06:09

## 2024-05-17 RX ADMIN — PANTOPRAZOLE SODIUM 40 MG: 40 TABLET, DELAYED RELEASE ORAL at 09:20

## 2024-05-17 NOTE — INCIDENTAL FINDINGS
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 - Approximately 50% left and less than 50% right stenosis of the proximal internal carotid arteries due to atherosclerosis.    Follow up required: Vascular surgery follow-up   Follow up should be done within 3 month(s)    Please notify the following clinician to assist with the follow up:    Referral provided to vascular surgery

## 2024-05-17 NOTE — PLAN OF CARE
Problem: PAIN - ADULT  Goal: Verbalizes/displays adequate comfort level or baseline comfort level  Description: Interventions:  - Encourage patient to monitor pain and request assistance  - Assess pain using appropriate pain scale  - Administer analgesics based on type and severity of pain and evaluate response  - Implement non-pharmacological measures as appropriate and evaluate response  - Consider cultural and social influences on pain and pain management  - Notify physician/advanced practitioner if interventions unsuccessful or patient reports new pain  Outcome: Progressing     Problem: INFECTION - ADULT  Goal: Absence or prevention of progression during hospitalization  Description: INTERVENTIONS:  - Assess and monitor for signs and symptoms of infection  - Monitor lab/diagnostic results  - Monitor all insertion sites, i.e. indwelling lines, tubes, and drains  - Monitor endotracheal if appropriate and nasal secretions for changes in amount and color  - [place] appropriate cooling/warming therapies per order  - Administer medications as ordered  - Instruct and encourage patient and family to use good hand hygiene technique  - Identify and instruct in appropriate isolation precautions for identified infection/condition  Outcome: Progressing  Goal: Absence of fever/infection during neutropenic period  Description: INTERVENTIONS:  - Monitor WBC    Outcome: Progressing

## 2024-05-17 NOTE — PLAN OF CARE
Problem: PHYSICAL THERAPY ADULT  Goal: Performs mobility at highest level of function for planned discharge setting.  See evaluation for individualized goals.  Description: Treatment/Interventions: ADL retraining, Functional transfer training, LE strengthening/ROM, Elevations, Therapeutic exercise, Endurance training, Patient/family training, Equipment eval/education, Bed mobility, Gait training, Compensatory technique education, Spoke to nursing, Spoke to case management, OT  Equipment Recommended: Walker       See flowsheet documentation for full assessment, interventions and recommendations.  Outcome: Progressing  Note: Prognosis: Good  Problem List: Decreased strength, Decreased endurance, Impaired balance, Decreased mobility  Assessment: Pt tolerated session well. She was able to ambulate with decreased assistance and progressed transfers to modified independent level with RW. She progressed to trial no AD with transfers and ambulation requiring stand by assistance for balance with ambulation. She was able to complete stairs and ramp/curb trialing without AD with assistance for balance. She is motivated to return to home. Pt stood at sink in bathroom 5' with supervision for balance with wide EUSEBIO while brushing teeth and combing hair. She is limited by decreased balance. She will continue to benefit form PT services to maximize LOF.  Barriers to Discharge: None  Barriers to Discharge Comments: pt lives alone, but has support and assistance from family prn  Rehab Resource Intensity Level, PT: III (Minimum Resource Intensity)    See flowsheet documentation for full assessment.

## 2024-05-17 NOTE — PHYSICAL THERAPY NOTE
"   PHYSICAL THERAPY TREATMENT NOTE  NAME:  Agatha Villeda  DATE: 05/17/24    Length Of Stay: 7  Performed at least 2 patient identifiers during session: Name and Birthday    TREATMENT FLOW SHEET:    05/17/24 0748   PT Last Visit   PT Visit Date 05/17/24   Note Type   Note Type Treatment   Pain Assessment   Pain Assessment Tool 0-10   Pain Score No Pain   Restrictions/Precautions   Other Precautions Chair Alarm;Fall Risk;Telemetry   General   Chart Reviewed Yes   Response to Previous Treatment Patient with no complaints from previous session.   Family/Caregiver Present No   Cognition   Orientation Level Oriented X4   Following Commands Follows all commands and directions without difficulty   Subjective   Subjective \"I am going to go to outpatient therapy.\"   Bed Mobility   Supine to Sit 7  Independent   Additional items Verbal cues   Additional Comments HOB flat without bedrail.   Transfers   Sit to Stand 6  Modified independent  (to independent)   Stand to Sit 6  Modified independent  (to independent)   Stand pivot 6  Modified independent  (with RW and supervision without AD)   Additional Comments use of RW. pt deonstrating proper hand placement with transfers with RW. independent for sit<>stand without AD. spt with RW modified indepndent with safe completion. without AD supervision for balance, pt guarded.   Ambulation/Elevation   Gait pattern Narrow EUSEBIO;Decreased foot clearance;Excessively slow;Short stride   Gait Assistance 5  Supervision  (with RW and SBA without AD)   Additional items Verbal cues   Assistive Device Rolling walker;None   Distance ambulated 14'x1 and 25'x1 with RW and then 140'x1 wiht RW with supervision with slight decreased right foot clearance at times. min cues for inc EUSEBIO. trialed ambulaiton without AD. ambulated 60'x1 and 140'x1 without AD with SBA with inc EUSEBIO, decreased step length, arm swing wiht min cues for inc arm swing and step length.   Stair Management Assistance 5  Supervision " "  Additional items Increased time required   Stair Management Technique Two rails;Alternating pattern;Reciprocal   Number of Stairs 4   Ramp Technique Forward;No rails   Ramp Assistance   (SBA with RW and steadying assistance without AD)   Additional items Verbal cues;Increased time required   Curbs 1 curb step 6\" with RW with SBA with min cues for sequence. then completed without AD with steadying assistance for balance.   Ambulation/Elevation Additional Comments completed ramp with RW with cues for inc step length and foot clearance and then completed without AD with steayding assistance for balance with cues for inc step length.   Balance   Static Sitting Normal   Dynamic Sitting Good   Static Standing Good   Dynamic Standing Fair +   Ambulatory Fair +   Activity Tolerance   Activity Tolerance Patient tolerated treatment well   Exercises   Neuro re-ed tandem walking without AD wiht steadying assistance 10'x2 forward and then 10'x2 without AD with steayding assistance for backward walking. SBA for lateral stepping right and left 10'x2 each.   Assessment   Prognosis Good   Problem List Decreased strength;Decreased endurance;Impaired balance;Decreased mobility   Barriers to Discharge None   Goals   Patient Goals \"Go home\"   PT Treatment Day 3   Plan   Treatment/Interventions ADL retraining;Functional transfer training;LE strengthening/ROM;Elevations;Therapeutic exercise;Endurance training;Patient/family training;Equipment eval/education;Bed mobility;Gait training;Compensatory technique education;Spoke to nursing;Spoke to case management;OT   Progress Progressing toward goals   PT Frequency 1-2x/wk   Discharge Recommendation   Rehab Resource Intensity Level, PT III (Minimum Resource Intensity)   Additional Comments use of walker upon return to home reocmmended. pt agreeable as she feels unsteadying without AD.   AM-PAC Basic Mobility Inpatient   Turning in Flat Bed Without Bedrails 4   Lying on Back to Sitting on " Edge of Flat Bed Without Bedrails 4   Moving Bed to Chair 3   Standing Up From Chair Using Arms 4   Walk in Room 3   Climb 3-5 Stairs With Railing 3   Basic Mobility Inpatient Raw Score 21   Basic Mobility Standardized Score 45.55   [facility] Highest Level Of Mobility   -HL Goal 6: Walk 10 steps or more   -HLM Achieved 8: Walk 250 feet ot more   Education   Education Provided Mobility training;Assistive device   Patient Demonstrates acceptance/verbal understanding   End of Consult   Patient Position at End of Consult Bedside chair;Bed/Chair alarm activated;All needs within reach       The patient's AM-PAC Basic Mobility Inpatient Short Form Raw Score is 21. A Raw score of greater than 16 suggests the patient may benefit from discharge to home. Please also refer to the recommendation of the Physical Therapist for safe discharge planning.     Pt tolerated session well. She was able to ambulate with decreased assistance and progressed transfers to modified independent level with RW. She progressed to trial no AD with transfers and ambulation requiring stand by assistance for balance with ambulation. She was able to complete stairs and ramp/curb trialing without AD with assistance for balance. She is motivated to return to home. Pt stood at sink in bathroom 5' with supervision for balance with wide EUSEBIO while brushing teeth and combing hair. She is limited by decreased balance. She will continue to benefit form PT services to maximize LOF.     Candace Gooden, PT,DPT

## 2024-05-17 NOTE — ASSESSMENT & PLAN NOTE
Hemoglobin noted to be 6.7 on admission, dropped to 5.8  Received 2 units packed red blood cells with appropriate compensation  Hgb stable   Noted to have severe iron deficiency anemia, completed 4 days IV Venofer  GI consult appreciated - S/P EGD/colonoscopy which showed hiatal hernia and esophageal erosions.  GI recommended Carafate and PPI.  On aspirin/Plavix per Neurology recs as above  Hemoglobin has remained stable posttransfusion.

## 2024-05-17 NOTE — DISCHARGE SUMMARY
"[facility]  Discharge- Agatha Villeda 1945, 79 y.o. female MRN: [id number]  Unit/Bed#: -[facility] Encounter: [id number]  Primary Care Provider: Nasrin Garcia MD   Date and time admitted to hospital: 5/10/2024  9:06 AM    Thrombocytosis  Assessment & Plan  Unclear if reactive given recent anemia or if cause of recent embolic stroke   Recommend hematology referral on discharge       Erythema of upper extremity  Assessment & Plan  Was contacted by nursing that patient had RUE redness and pain at the area of prior IV site in the right AC. Patient seen and examined. Mild erythema of Right AC area noted with mild tenderness.   Patient is afebrile but does have mild leukocytosis with WBCs of 12. S/p 4 doses  Area of arm is slightly erythematous but does not appear to be infectious - will d/c abx for now 5/16  VAS upper limb venous duplex   R: No evidence of acute or chronic DVT.  Evidence of acute occlusive superficial thrombophlebitis noted in cephalic vein from the antecubital fossa to mid upper arm   L: No evidence of thrombus in the internal jugular vein, subclavian vein, and innominate vein  Continue with supportive care, elevation, and heat    Acute anemia  Assessment & Plan  Hemoglobin noted to be 6.7 on admission, dropped to 5.8  Received 2 units packed red blood cells with appropriate compensation  Hgb stable   Noted to have severe iron deficiency anemia, completed 4 days IV Venofer  GI consult appreciated - S/P EGD/colonoscopy which showed hiatal hernia and esophageal erosions.  GI recommended Carafate and PPI.  On aspirin/Plavix per Neurology recs as above  Hemoglobin has remained stable posttransfusion.    * Acute CVA (cerebrovascular accident) (HCC)  Assessment & Plan  Presented with R leg weakness that began 3 days prior to admission   CTA head/neck revealed:  \"CT head: -Hypodensities within the left parietal lobe and left cerebellar hemisphere may represent " "age-indeterminate infarct. Recommend MRI of the brain for further evaluation. -Chronic microangiopathic changes.  CTA head: -No large vessel occlusion. Question moderate to severe right and moderate left supraclinoid stenosis, possibly exaggerated due to motion. -Focal moderate to severe stenosis involving the proximal right P2 segment of the PCA.  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 - Approximately 50% left and less than 50% right stenosis of the proximal internal carotid arteries due to atherosclerosis.  Other: -Spondylosis of the cervical spine including grade 1 listhesis as above. -Mediastinal and bilateral hilar adenopathy, nonspecific. Clinical correlation and dedicated chest CT advised.\"  MRI revealed, \"1.  Multifocal acute/subacute infarcts involving bilateral frontoparietal and occipital lobes as well as left cerebellum suggesting embolic etiology. There is focal petechial hemorrhage within the left parietal lobe infarct. No mass effect. 2.  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 Chronic microangiopathic change. 3.  Right mastoid effusion. Correlate for mastoiditis.\"  Neurology consult appreciated - on aspirin 81mg daily, Plavix 75mg daily, Lipitor 40mg daily. Will need Loop recorder as outpatient   PT/OT - will discharge home with referral for outpt PT  Ambulatory referral for vascular surgery on discharge         Medical Problems       Resolved Problems  Date Reviewed: 5/16/2024   None       Discharging Physician / Practitioner: Nai Cox MD  PCP: Nasrin Garcia MD  Admission Date:   Admission Orders (From admission, onward)       Ordered        05/10/24 1239  INPATIENT ADMISSION  Once                          Discharge Date: 05/17/24    Consultations During Hospital Stay:  Neurology  GI    Procedures Performed:   EGD and colonoscopy    Significant Findings / Test Results:   CTA head: -No large vessel occlusion. Question moderate to severe right and moderate left " "supraclinoid stenosis, possibly exaggerated due to motion. -Focal moderate to severe stenosis involving the proximal right P2 segment of the PCA.  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 - Approximately 50% left and less than 50% right stenosis of the proximal internal carotid arteries due to atherosclerosis.  Other: -Spondylosis of the cervical spine including grade 1 listhesis as above. -Mediastinal and bilateral hilar adenopathy, nonspecific. Clinical correlation and dedicated chest CT advised.\"  MRI revealed, \"1.  Multifocal acute/subacute infarcts involving bilateral frontoparietal and occipital lobes as well as left cerebellum suggesting embolic etiology. There is focal petechial hemorrhage within the left parietal lobe infarct. No mass effect. 2.  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    Incidental Findings:   Carotid Stenosis   I reviewed the above mentioned incidental findings with the patient and/or family and they expressed understanding.    Test Results Pending at Discharge (will require follow up):   NA     Outpatient Tests Requested:  Loop recorder  Vascular surgery and hematology evaluation    Complications:  none     Reason for Admission: Strokelike symptoms    Hospital Course:   Agatha Villeda is a 79 y.o. female patient who originally presented to the hospital on 5/10/2024 due to right leg weakness which began prior to admission.  Stroke workup was initiated and MRI revealed multifocal acute/subacute infarct involving bilateral frontal parietal and occipital lobe as well as cerebellum suggestive of embolic etiology.  Neurology recommended dual antiplatelet therapy with aspirin Plavix and high intensity statin with Lipitor with outpatient loop recorder.  Amatory referral provided for vascular surgery on discharge because of carotid stenosis seen on CTA neck.  Patient was found to have profound anemia with hemoglobin of 5.8.  Received 2 unit of packed RBCs.  Completed " "4 days of IV Venofer.  S/p EGD and colonoscopy which showed hiatal hernia and esophageal erosions without ulceration.  Maintained on Carafate 4 times daily as well as PPI twice daily.  Outpatient referral to hematology provided for thrombocytosis.  Stable for discharge home with outpatient physical therapy referral        Please see above list of diagnoses and related plan for additional information.     Condition at Discharge: stable    Discharge Day Visit / Exam:   Subjective: Patient seen and examined at bedside.  Does not offer any complaints.  No acute events overnight.  Vitals: Blood Pressure: 150/69 (05/17/24 1102)  Pulse: 86 (05/17/24 1102)  Temperature: 97.7 °F (36.5 °C) (05/17/24 1102)  Temp Source: Temporal (05/17/24 1102)  Respirations: 18 (05/17/24 1102)  Height: 5' 1\" (154.9 cm) (05/16/24 1427)  Weight - Scale: 55.8 kg (123 lb) (05/16/24 1427)  SpO2: 98 % (05/17/24 1102)  Exam:   Physical Exam  Constitutional:       General: She is not in acute distress.  HENT:      Head: Normocephalic.      Nose: Nose normal.      Mouth/Throat:      Mouth: Mucous membranes are moist.   Eyes:      Extraocular Movements: Extraocular movements intact.      Pupils: Pupils are equal, round, and reactive to light.   Cardiovascular:      Rate and Rhythm: Normal rate.      Pulses: Normal pulses.   Pulmonary:      Effort: Pulmonary effort is normal.      Breath sounds: Normal breath sounds.   Abdominal:      General: Abdomen is flat.      Palpations: Abdomen is soft.   Musculoskeletal:      Right lower leg: No edema.      Left lower leg: No edema.   Skin:     General: Skin is warm.   Neurological:      General: No focal deficit present.      Mental Status: She is alert and oriented to person, place, and time.          Discussion with Family: Updated  (granddaughter) via phone.    Discharge instructions/Information to patient and family:   See after visit summary for information provided to patient and family.  "     Provisions for Follow-Up Care:  See after visit summary for information related to follow-up care and any pertinent home health orders.      Mobility at time of Discharge:   Basic Mobility Inpatient Raw Score: 24  JH-HLM Goal: 8: Walk 250 feet or more  JH-HLM Achieved: 7: Walk 25 feet or more  HLM Goal achieved. Continue to encourage appropriate mobility.     Disposition:   Home    Planned Readmission: no     Discharge Statement:  I spent 30 minutes discharging the patient. This time was spent on the day of discharge. I had direct contact with the patient on the day of discharge. Greater than 50% of the total time was spent examining patient, answering all patient questions, arranging and discussing plan of care with patient as well as directly providing post-discharge instructions.  Additional time then spent on discharge activities.    Discharge Medications:  See after visit summary for reconciled discharge medications provided to patient and/or family.      **Please Note: This note may have been constructed using a voice recognition system**

## 2024-05-20 ENCOUNTER — CARE COORDINATION (OUTPATIENT)
Dept: OTHER | Facility: CLINIC | Age: 79
End: 2024-05-20

## 2024-05-20 NOTE — CARE COORDINATION
Care Transitions Note    Initial Call - Call within 2 business days of discharge: Yes     Attempted to reach patient for transitions of care follow up. Unable to reach patient.    Outreach Attempts:   Unable to leave message.     Patient: Marci Orellana    Patient : 1945   MRN: [id number]    Reason for Admission: Other symptoms and signs involving the musculoskeletal system    Discharge Date:  24  RURS: Readmission Risk              Risk of Unplanned Readmission:  0          Last Discharge Facility       None            Was this an external facility discharge? [facility]        Plan for follow-up on next business day.      Verenice Kwok RN

## 2024-05-21 ENCOUNTER — EVALUATION (OUTPATIENT)
Dept: PHYSICAL THERAPY | Facility: CLINIC | Age: 79
End: 2024-05-21
Payer: MEDICARE

## 2024-05-21 ENCOUNTER — CARE COORDINATION (OUTPATIENT)
Dept: OTHER | Facility: CLINIC | Age: 79
End: 2024-05-21

## 2024-05-21 DIAGNOSIS — R26.2 AMBULATORY DYSFUNCTION: ICD-10-CM

## 2024-05-21 DIAGNOSIS — I63.9 CEREBROVASCULAR ACCIDENT (CVA), UNSPECIFIED MECHANISM (HCC): Primary | ICD-10-CM

## 2024-05-21 PROCEDURE — 97161 PT EVAL LOW COMPLEX 20 MIN: CPT

## 2024-05-21 PROCEDURE — 97110 THERAPEUTIC EXERCISES: CPT

## 2024-05-21 NOTE — PROGRESS NOTES
"PT Evaluation     Today's date: 2024  Patient name: Agatha Villeda  : 1945  MRN: [id number]  Referring provider: Nai Cox MD  Dx:   Encounter Diagnosis     ICD-10-CM    1. Stroke (HCC)  I63.9 Ambulatory referral to Physical Therapy      2. Ambulatory dysfunction  R26.2           Start Time: 1100  Stop Time: 1200  Total time in clinic (min): 60 minutes    Assessment  Impairments: abnormal gait, abnormal movement, activity intolerance, impaired balance, impaired physical strength, lacks appropriate home exercise program, poor body mechanics, participation limitations, activity limitations and endurance    Assessment details: Patient is a 79 y.o. female s/p CVA. Following a thorough physical therapy evaluation, the patient demonstrates objective impairments in gait mechanics, balance, and RLE strength. Functionally, she is below her baseline level of function pre-CVA and functional testing places patient at greater risk of falls. Provided patient c/ handout c/ HEP and educated regarding safety at home c/ rollator and exercises. Patient demonstrated each exercise c/ good form and verbalized understanding of expectations c/ HEP. Patient will benefit from skilled physical therapy treatment to address the aforementioned impairments and functional deficits, accomplish patient goals and return patient to PLOF.      Goals  STG (3 weeks):  Increase LE strength by 1/2 grade.   Maintain tandem balance for 10\".    LTG (6 weeks):  LE Strength >=4+/5.  Five time sit to stand <=12.6\"   TUG <=9\"   Patient will be independent with HEP in 6 weeks to allow independent management of condition.      Plan  Patient would benefit from: skilled physical therapy  Planned modality interventions: cryotherapy and thermotherapy: hydrocollator packs    Planned therapy interventions: neuromuscular re-education, manual therapy, patient/caregiver education, self care, therapeutic exercise, therapeutic activities and home " "exercise program    Frequency: 2x week  Duration in weeks: 6  Plan of Care beginning date: 2024  Plan of Care expiration date: 2024  Plan details: TE, NMR, TA, MT, self-care, and modalities as needed in order to progress through skilled PT focused on ROM, strength, balance, motor control.          Subjective Evaluation    History of Present Illness  Mechanism of injury: IE: Patient is a 79 y.o. female presenting s/p CVA while on a cruise ship in [place] on 24. She presented to the ED upon return to the [place] and was hospitalized for 1 week. She reports she had sudden RLE weakness, stiffness in LUE, and was off balance. Since then, report she still has some weakness and dragging of RLE and still a little off balance from that. She lives alone, 4 O[employer] c/ rails on both sides in front and 6-7 O[employer] c/ B rails in back, which she typically uses. No stairs within. Presents currently using Rollator walker, but was ambulated s/ AD prior to CVA. Patients primary goals for PT are to gardening, mowing the lawn and walk s/ AD. Has f/u scheduled c/ PCP on . (+) DVT in RUE, on blood thinners.   Patient Goals  Patient goals for therapy: decreased pain, increased motion, increased strength, return to sport/leisure activities and improved balance    Pain  No pain reported    Social Support  Steps to enter house: yes (B HRs)  7  Stairs in house: no   Lives with: alone    Employment status: not working      Objective    /80    Gait: Ambulates c/ Rollator, good technique, moderately slow gait speed. S/ AD unsteady arms in high guard.     -Stairs: Step to step, leading c/ LLE up, RLE down. B HRs     Balance:  -NBOS: 30\"  -Modified Tandem (RFF/LFF): 30\"/30\"  -Tandem (RFF/LFF): 20\"/4\"     Functional Tests:  Five Time Sit to Stand: 14\"  TU\" c/ Rollator, Ues to push up from chair            17\" s/ AD, UE push up from chair (PT CGA)     LE Strength (R/L):     IE  Hip Flexion:   4- / 4+  Hip Abduction:  4- / 4  Knee " "Extension:  4 / 5  Knee Flexion:   4- / 4  Ankle Dorsiflexion:  4- / 4+  Ankle Plantarflexion:  4 / 4-        Precautions: CVA 5/6/24, RUE DVT on Blood thinners, HTN    Daily Treatment Diary:      Initial Evaluation Date: 05/21/24  POC Expiration: 7/2/24  Compliance 05/21/24                     Visit Number 1                    Re-Eval  IE                 MC   Foto Captured Y                         Date 5/21       Manuals                                Neuro Re-Ed        NBOS c/ Foam nv       Tandem Walk        Tandem        SLS        Sidestepping nv       Gait c/ Head Turns & Nods        Stop/Start Gait                        Ther Ex        Gastroc Stretch 30\"/2 nv      LAQ nv       HC nv       Bridge x5       Ball Squeeze 2/10       TB Supine Clams RTB 5\"/20       SLR FLX 2/8ea       TKE        HR nv       Seated TR nv       Stand: Abd/Ext        Stand: March/HC        Leg Press        Piriformis Stretch        NuStep nv       Education Safety at Home and Use of Rollator               Ther Activity        Step-ups: Fwd nv       Weighted Carry        Sit to Stands nv       Wall Squat                                Gait Training        Ambulation        Stairs                Modalities        CP                 Access Code: [email or web address]  URL: [email or web address]  Date: 05/21/2024  Prepared by: Ninfa Ritchie    Exercises  - Hooklying Clamshell with Resistance  - 1 x daily - 7 x weekly - 20-30 reps - 5 seconds hold  - Supine Hip Adduction Isometric with Ball  - 1 x daily - 7 x weekly - 2-3 sets - 10 reps  - Active Straight Leg Raise with Quad Set  - 1 x daily - 7 x weekly - 2-3 sets - 6-10 reps  - Supine Bridge  - 1 x daily - 7 x weekly - 2-3 sets - 6-10 reps  - Seated Toe Raise  - 1 x daily - 7 x weekly - 10-20 reps - 3-5 seconds hold  - Long Sitting Calf Stretch with Strap  - 3 x daily - 7 x weekly - 4 reps - 30 seconds hold         "

## 2024-05-21 NOTE — CARE COORDINATION
Care Transitions Note    Initial Call - Call within 2 business days of discharge: Yes     Patient Current Location:  [place]    Care Transition Nurse contacted the patient by telephone to perform post hospital discharge assessment, verified name and  as identifiers. Provided introduction to self, and explanation of the Care Transition Nurse role.     Patient: Marci Orellana    Patient : 1945   MRN: [id number]    Reason for Admission: Other symptoms and signs involving the musculoskeletal system  Discharge Date:   24 RURS: No data recorded    Was this an external facility discharge? [facility]    Care Summary Note: Patient declined SONDRA and ACM calls. Pt states she \"does not need anything and manages fine on her own>\"      Patient closed (declined further SONDRA services) from the Care Transitions program on 24.      Verenice Kwok RN

## 2024-05-22 ENCOUNTER — OFFICE VISIT (OUTPATIENT)
Dept: PHYSICAL THERAPY | Facility: CLINIC | Age: 79
End: 2024-05-22
Payer: MEDICARE

## 2024-05-22 DIAGNOSIS — I63.9 CEREBROVASCULAR ACCIDENT (CVA), UNSPECIFIED MECHANISM (HCC): Primary | ICD-10-CM

## 2024-05-22 DIAGNOSIS — R26.2 AMBULATORY DYSFUNCTION: ICD-10-CM

## 2024-05-22 PROCEDURE — 97530 THERAPEUTIC ACTIVITIES: CPT

## 2024-05-22 PROCEDURE — 97110 THERAPEUTIC EXERCISES: CPT

## 2024-05-22 PROCEDURE — 97112 NEUROMUSCULAR REEDUCATION: CPT

## 2024-05-22 NOTE — PROGRESS NOTES
"Daily Note     Today's date: 2024  Patient name: Agatha Villeda  : 1945  MRN: [id number]  Referring provider: Nai Cox MD  Dx:   Encounter Diagnosis     ICD-10-CM    1. Cerebrovascular accident (CVA), unspecified mechanism (HCC)  I63.9       2. Ambulatory dysfunction  R26.2           Start Time: 0700  Stop Time: 0755  Total time in clinic (min): 55 minutes    Subjective: Reports she was able to do her HEP without difficulty.      Objective: See treatment diary below      Assessment: Tolerated treatment well. Patient demonstrated fatigue post treatment, exhibited good technique with therapeutic exercises, and would benefit from continued PT. Fatigued quickly c/ second set of sit to stands. Able to maintain balance on Biodex, but was more challenged c/ progressing release of plate. Educated patient regarding typical muscle soreness response post-exercise and potential for DOMS. She verbalized understanding.       Plan: Continue per plan of care.      Precautions: CVA 24, RUE DVT on Blood thinners, HTN, Fall risk (Gait Belt)    Daily Treatment Diary:      Initial Evaluation Date: 24  POC Expiration: 24  Compliance 24                   Visit Number 1 2                   Re-Eval  IE                 MC   Foto Captured Y                         Date       Manuals                                Neuro Re-Ed        NBOS c/ Foam nv 30\"/1 DC     Tandem Walk        Tandem        SLS        Sidestepping nv nv      Gait c/ Head Turns & Nods  50'/2 no AD PT CGA      Stop/Start Gait        Marching c/ Pause hold in SLS  50'/2 no AD PT CGA      Biodex  Static-L5 Postural Stability 1'/2              Ther Ex        Gastroc Stretch 30\"/2 np      LAQ nv nv      HC nv nv      Bridge x5 HEP      Ball Squeeze 2/10 HEP      TB Supine Clams RTB 5\"/20 HEP      SLR FLX 2/8ea HEP      TKE        HR nv 2/10      Seated TR nv 3\"x20      Bent Over Extension        Stand: Abd  2/10     "   Stand: March  2/10      Leg Press        Piriformis Stretch        NuStep nv 6' L1 UE & LE      Education Safety at Home and Use of Rollator               Ther Activity        Step-ups: Fwd nv nv      Weighted Carry  5# DB b/l 50'/2      Sit to Stands nv 1/10, 1/7      Wall Squat        Floor Transfer        Lunges                        Gait Training        Ambulation        Stairs                Modalities        CP                 Access Code: [email or web address]  URL: [email or web address]  Date: 05/21/2024  Prepared by: Ninfa Ritchie    Exercises  - Hooklying Clamshell with Resistance  - 1 x daily - 7 x weekly - 20-30 reps - 5 seconds hold  - Supine Hip Adduction Isometric with Ball  - 1 x daily - 7 x weekly - 2-3 sets - 10 reps  - Active Straight Leg Raise with Quad Set  - 1 x daily - 7 x weekly - 2-3 sets - 6-10 reps  - Supine Bridge  - 1 x daily - 7 x weekly - 2-3 sets - 6-10 reps  - Seated Toe Raise  - 1 x daily - 7 x weekly - 10-20 reps - 3-5 seconds hold  - Long Sitting Calf Stretch with Strap  - 3 x daily - 7 x weekly - 4 reps - 30 seconds hold

## 2024-05-29 ENCOUNTER — OFFICE VISIT (OUTPATIENT)
Dept: PHYSICAL THERAPY | Facility: CLINIC | Age: 79
End: 2024-05-29
Payer: MEDICARE

## 2024-05-29 DIAGNOSIS — R26.2 AMBULATORY DYSFUNCTION: ICD-10-CM

## 2024-05-29 DIAGNOSIS — I63.9 CEREBROVASCULAR ACCIDENT (CVA), UNSPECIFIED MECHANISM (HCC): Primary | ICD-10-CM

## 2024-05-29 PROCEDURE — 97112 NEUROMUSCULAR REEDUCATION: CPT | Performed by: PHYSICAL THERAPIST

## 2024-05-29 PROCEDURE — 97530 THERAPEUTIC ACTIVITIES: CPT | Performed by: PHYSICAL THERAPIST

## 2024-05-29 PROCEDURE — 97110 THERAPEUTIC EXERCISES: CPT | Performed by: PHYSICAL THERAPIST

## 2024-05-29 NOTE — PROGRESS NOTES
"Daily Note     Today's date: 2024  Patient name: Agatha Villeda  : 1945  MRN: [id number]  Referring provider: Nai Cox MD  Dx:   Encounter Diagnosis     ICD-10-CM    1. Cerebrovascular accident (CVA), unspecified mechanism (HCC)  I63.9       2. Ambulatory dysfunction  R26.2                      Subjective: Patient offers no new complaints.      Objective: See treatment diary below      Assessment: Tolerated treatment well. She was able to add side stepping, step ups, LAQ, HC, and increase repetitions for sit to stands today. No LOB noted throughout the session. Patient demonstrated fatigue post treatment, exhibited good technique with therapeutic exercises, and would benefit from continued PT      Plan: Continue per plan of care.  Progress treatment as tolerated.       Precautions: CVA 24, RUE DVT on Blood thinners, HTN, Fall risk (Gait Belt)    Daily Treatment Diary:      Initial Evaluation Date: 24  POC Expiration: 24  Compliance 24                 Visit Number 1 2  3                 Re-Eval  IE                 MC   Foto Captured Y                         Date      Manuals                                Neuro Re-Ed        NBOS c/ Foam nv 30\"/1 DC     Tandem Walk        Tandem        SLS        Sidestepping nv nv Mirror 4 laps     Gait c/ Head Turns & Nods  50'/2 no AD PT CGA 50'/2 no AD PT CGA     Stop/Start Gait        Marching c/ Pause hold in SLS  50'/2 no AD PT CGA 50'/2 no AD PT CGA     Biodex  Static-L5 Postural Stability 1'/2 L3-5 Postural Stability 1'/2             Ther Ex        Gastroc Stretch 30\"/2 np      LAQ nv nv 5\" x15 ea     HC nv nv Stand 2/10     Bridge x5 HEP      Ball Squeeze 2/10 HEP      TB Supine Clams RTB 5\"/20 HEP      SLR FLX 2/8ea HEP      TKE        HR nv 2/10 2/10     Seated TR nv 3\"x20 3\"x20     Bent Over Extension        Stand: Abd  2/10  2/10     Stand: March  2/10 2/10     Leg Press        Piriformis Stretch    " "    NuStep nv 6' L1 UE & LE 6' L1  UE & LE     Education Safety at Home and Use of Rollator               Ther Activity        Step-ups: Fwd nv nv 6\" x10 ea     Weighted Carry  5# DB b/l 50'/2 5# DB b/l 50'/2     Sit to Stands nv 1/10, 1/7 2/10       Wall Squat        Floor Transfer        Lunges                        Gait Training        Ambulation        Stairs                Modalities        CP                 Access Code: [email or web address]  URL: [email or web address]  Date: 05/21/2024  Prepared by: Ninfa Ritchie    Exercises  - Hooklying Clamshell with Resistance  - 1 x daily - 7 x weekly - 20-30 reps - 5 seconds hold  - Supine Hip Adduction Isometric with Ball  - 1 x daily - 7 x weekly - 2-3 sets - 10 reps  - Active Straight Leg Raise with Quad Set  - 1 x daily - 7 x weekly - 2-3 sets - 6-10 reps  - Supine Bridge  - 1 x daily - 7 x weekly - 2-3 sets - 6-10 reps  - Seated Toe Raise  - 1 x daily - 7 x weekly - 10-20 reps - 3-5 seconds hold  - Long Sitting Calf Stretch with Strap  - 3 x daily - 7 x weekly - 4 reps - 30 seconds hold           "

## 2024-06-03 NOTE — PROGRESS NOTES
"Daily Note     Today's date: 2024  Patient name: Agatha Villeda  : 1945  MRN: [id number]  Referring provider: Nai Cox MD  Dx:   Encounter Diagnosis     ICD-10-CM    1. Cerebrovascular accident (CVA), unspecified mechanism (HCC)  I63.9       2. Ambulatory dysfunction  R26.2                      Subjective: The patient states that she is doing okay today, offers no complaints at start of session.  Reports that therapy has been helping and she is feeling stronger.        Objective: See treatment diary below      Assessment: Tolerated treatment well.  She is challenged with walking balance activities and needs CGA of PT to complete for safety.  She did veer off to L and R with ambulation by no LOB noted.  Patient demonstrated fatigue post treatment and would benefit from continued PT      Plan: Continue per plan of care.   Progress as able in upcoming visits.       Precautions: CVA 24, RUE DVT on Blood thinners, HTN, Fall risk (Gait Belt)    Daily Treatment Diary:      Initial Evaluation Date: 24  POC Expiration: 24  Compliance 24  64               Visit Number 1 2  3  4               Re-Eval  IE                 MC   Foto Captured Y                         Date     Manuals                                Neuro Re-Ed        NBOS c/ Foam nv 30\"/1 DC     Tandem Walk        Tandem        SLS        Sidestepping nv nv Mirror 4 laps Mirror  4 laps    Gait c/ Head Turns & Nods  50'/2 no AD PT CGA 50'/2 no AD PT CGA 50'x2 no AD PT CGA    Stop/Start Gait        Marching c/ Pause hold in SLS  50'/2 no AD PT CGA 50'/2 no AD PT CGA 50'x2 no AD PT CGA    Biodex  Static-L5 Postural Stability 1'/2 L3-5 Postural Stability 1'/2 L3-5 postural stability 1'x2            Ther Ex        Gastroc Stretch 30\"/2 np      LAQ nv nv 5\" x15 ea 5\" 2x10 ea    HC nv nv Stand 2/10 Stand 2x10    Bridge x5 HEP      Ball Squeeze 2/10 HEP      TB Supine Clams RTB 5\"/20 HEP      SLR " "FLX 2/8ea HEP      TKE        HR nv 2/10 2/10 2x10    Seated TR nv 3\"x20 3\"x20 3\"x20    Bent Over Extension        Stand: Abd  2/10  2/10 2x10    Stand: March  2/10 2/10 2x10    Leg Press        Piriformis Stretch        NuStep nv 6' L1 UE & LE 6' L1  UE & LE L1 7' UE & LE    Education Safety at Home and Use of Rollator               Ther Activity        Step-ups: Fwd nv nv 6\" x10 ea 6\" 10x ea     Weighted Carry  5# DB b/l 50'/2 5# DB b/l 50'/2 5# DB [place] 50'x2    Sit to Stands nv 1/10, 1/7 2/10   2x10    Wall Squat        Floor Transfer        Lunges                        Gait Training        Ambulation        Stairs                Modalities        CP                 Access Code: [email or web address]  URL: [email or web address]  Date: 05/21/2024  Prepared by: Ninfa Ritchie    Exercises  - Hooklying Clamshell with Resistance  - 1 x daily - 7 x weekly - 20-30 reps - 5 seconds hold  - Supine Hip Adduction Isometric with Ball  - 1 x daily - 7 x weekly - 2-3 sets - 10 reps  - Active Straight Leg Raise with Quad Set  - 1 x daily - 7 x weekly - 2-3 sets - 6-10 reps  - Supine Bridge  - 1 x daily - 7 x weekly - 2-3 sets - 6-10 reps  - Seated Toe Raise  - 1 x daily - 7 x weekly - 10-20 reps - 3-5 seconds hold  - Long Sitting Calf Stretch with Strap  - 3 x daily - 7 x weekly - 4 reps - 30 seconds hold             "

## 2024-06-04 ENCOUNTER — OFFICE VISIT (OUTPATIENT)
Dept: PHYSICAL THERAPY | Facility: CLINIC | Age: 79
End: 2024-06-04
Payer: MEDICARE

## 2024-06-04 DIAGNOSIS — I63.9 CEREBROVASCULAR ACCIDENT (CVA), UNSPECIFIED MECHANISM (HCC): Primary | ICD-10-CM

## 2024-06-04 DIAGNOSIS — R26.2 AMBULATORY DYSFUNCTION: ICD-10-CM

## 2024-06-04 PROCEDURE — 97530 THERAPEUTIC ACTIVITIES: CPT | Performed by: PHYSICAL THERAPIST

## 2024-06-04 PROCEDURE — 97110 THERAPEUTIC EXERCISES: CPT | Performed by: PHYSICAL THERAPIST

## 2024-06-04 PROCEDURE — 97112 NEUROMUSCULAR REEDUCATION: CPT | Performed by: PHYSICAL THERAPIST

## 2024-06-06 ENCOUNTER — OFFICE VISIT (OUTPATIENT)
Dept: PHYSICAL THERAPY | Facility: CLINIC | Age: 79
End: 2024-06-06
Payer: MEDICARE

## 2024-06-06 DIAGNOSIS — R26.2 AMBULATORY DYSFUNCTION: ICD-10-CM

## 2024-06-06 DIAGNOSIS — I63.9 CEREBROVASCULAR ACCIDENT (CVA), UNSPECIFIED MECHANISM (HCC): Primary | ICD-10-CM

## 2024-06-06 PROCEDURE — 97110 THERAPEUTIC EXERCISES: CPT

## 2024-06-06 PROCEDURE — 97530 THERAPEUTIC ACTIVITIES: CPT

## 2024-06-06 PROCEDURE — 97112 NEUROMUSCULAR REEDUCATION: CPT

## 2024-06-06 NOTE — PROGRESS NOTES
"Daily Note     Today's date: 2024  Patient name: Agatha Villeda  : 1945  MRN: [id number]  Referring provider: Nai Cox MD  Dx:   Encounter Diagnosis     ICD-10-CM    1. Cerebrovascular accident (CVA), unspecified mechanism (HCC)  I63.9       2. Ambulatory dysfunction  R26.2           Start Time: 0930  Stop Time: 1020  Total time in clinic (min): 50 minutes    Subjective: Report she feels she is nearly back her her baseline. Only notices balance issues when walking in her garden on uneven surfaces.       Objective: See treatment diary below      Assessment: Tolerated treatment well. Patient demonstrated fatigue post treatment, exhibited good technique with therapeutic exercises, and would benefit from continued PT. Demonstrates improving stability c/ Biodex. Able to increase level of instability during postural stability and motor control activities.      Plan: Continue per plan of care.      Precautions: CVA 24, RUE DVT on Blood thinners, HTN, Fall risk (Gait Belt)    Daily Treatment Diary:      Initial Evaluation Date: 24  POC Expiration: 24  Compliance 24             Visit Number 1 2  3  4  5             Re-Eval  IE                 MC   Foto Captured Y                         Date    Manuals                                Neuro Re-Ed        NBOS c/ Foam nv 30\"/1 DC     Tandem Walk        Tandem        SLS        Sidestepping nv nv Mirror 4 laps Mirror  4 laps Mirror 4 laps   Gait c/ Head Turns & Nods  50'/2 no AD PT CGA 50'/2 no AD PT CGA 50'x2 no AD PT CGA 50'/2  no AD PT CGA   Stop/Start Gait        Marching c/ Pause hold in SLS  50'/2 no AD PT CGA 50'/2 no AD PT CGA 50'x2 no AD PT CGA 50'/2 no AD PT CGA    Biodex  Static-L5 Postural Stability 1'/2 L3-5 Postural Stability 1'/2 L3-5 postural stability 1'x2 L5-8 postural stability 1'x2, motor control 1'x2           Ther Ex        Gastroc Stretch 30\"/2 np      LAQ nv nv 5\" x15 " "ea 5\" 2x10 ea 2#  5\" 2/10ea   HC nv nv Stand 2/10 Stand 2x10 Stand 2# 2/10   Bridge x5 HEP   Reviewed for HEP 2/10   Ball Squeeze 2/10 HEP      TB Supine Clams RTB 5\"/20 HEP      SLR FLX 2/8ea HEP      TKE        HR nv 2/10 2/10 2x10 2/10   Seated TR nv 3\"x20 3\"x20 3\"x20 3\"/20   Bent Over Extension        Stand: Abd  2/10  2/10 2x10 2/10 2#   Stand: March  2/10 2/10 2x10 2/10   Leg Press        Piriformis Stretch        NuStep nv 6' L1 UE & LE 6' L1  UE & LE L1 7' UE & LE L1 7' UE & LE   Education Safety at Home and Use of Rollator               Ther Activity        Step-ups: Fwd nv nv 6\" x10 ea 6\" 10x ea  nv   Weighted Carry  5# DB b/l 50'/2 5# DB b/l 50'/2 5# DB [place] 50'x2 5# DB B/l 50'/2   Sit to Stands nv 1/10, 1/7 2/10   2x10 2/10   Wall Squat        Floor Transfer        Lunges        Obstacle Course     Airex Cushions x5 Varying levels 30'/4           Gait Training        Ambulation        Stairs                Modalities        CP                 Access Code: [email or web address]  URL: [email or web address]  Date: 05/21/2024  Prepared by: Ninfa Ritchie    Exercises  - Hooklying Clamshell with Resistance  - 1 x daily - 7 x weekly - 20-30 reps - 5 seconds hold  - Supine Hip Adduction Isometric with Ball  - 1 x daily - 7 x weekly - 2-3 sets - 10 reps  - Active Straight Leg Raise with Quad Set  - 1 x daily - 7 x weekly - 2-3 sets - 6-10 reps  - Supine Bridge  - 1 x daily - 7 x weekly - 2-3 sets - 6-10 reps  - Seated Toe Raise  - 1 x daily - 7 x weekly - 10-20 reps - 3-5 seconds hold  - Long Sitting Calf Stretch with Strap  - 3 x daily - 7 x weekly - 4 reps - 30 seconds hold               "

## 2024-06-11 ENCOUNTER — OFFICE VISIT (OUTPATIENT)
Dept: FAMILY MEDICINE CLINIC | Facility: CLINIC | Age: 79
End: 2024-06-11
Payer: MEDICARE

## 2024-06-11 VITALS
SYSTOLIC BLOOD PRESSURE: 138 MMHG | BODY MASS INDEX: 22.38 KG/M2 | HEART RATE: 89 BPM | HEIGHT: 60 IN | WEIGHT: 114 LBS | DIASTOLIC BLOOD PRESSURE: 78 MMHG | TEMPERATURE: 97.7 F | OXYGEN SATURATION: 95 %

## 2024-06-11 DIAGNOSIS — Z78.0 ASYMPTOMATIC POSTMENOPAUSAL STATE: ICD-10-CM

## 2024-06-11 DIAGNOSIS — Z13.29 SCREENING FOR THYROID DISORDER: ICD-10-CM

## 2024-06-11 DIAGNOSIS — I63.9 ACUTE CVA (CEREBROVASCULAR ACCIDENT) (HCC): ICD-10-CM

## 2024-06-11 DIAGNOSIS — I63.9 STROKE (HCC): ICD-10-CM

## 2024-06-11 DIAGNOSIS — R32 URINARY INCONTINENCE, UNSPECIFIED TYPE: ICD-10-CM

## 2024-06-11 DIAGNOSIS — Z11.59 NEED FOR HEPATITIS C SCREENING TEST: ICD-10-CM

## 2024-06-11 DIAGNOSIS — D75.839 THROMBOCYTOSIS: ICD-10-CM

## 2024-06-11 DIAGNOSIS — D64.9 ACUTE ANEMIA: ICD-10-CM

## 2024-06-11 DIAGNOSIS — E55.9 VITAMIN D DEFICIENCY: ICD-10-CM

## 2024-06-11 DIAGNOSIS — Z00.00 MEDICARE ANNUAL WELLNESS VISIT, SUBSEQUENT: Primary | ICD-10-CM

## 2024-06-11 PROCEDURE — 99205 OFFICE O/P NEW HI 60 MIN: CPT | Performed by: FAMILY MEDICINE

## 2024-06-11 PROCEDURE — G0438 PPPS, INITIAL VISIT: HCPCS | Performed by: FAMILY MEDICINE

## 2024-06-11 RX ORDER — CLOPIDOGREL BISULFATE 75 MG/1
75 TABLET ORAL DAILY
Qty: 90 TABLET | Refills: 1 | Status: SHIPPED | OUTPATIENT
Start: 2024-06-11

## 2024-06-11 RX ORDER — AMLODIPINE BESYLATE 5 MG/1
5 TABLET ORAL DAILY
Qty: 90 TABLET | Refills: 3 | Status: SHIPPED | OUTPATIENT
Start: 2024-06-11

## 2024-06-11 RX ORDER — PANTOPRAZOLE SODIUM 40 MG/1
40 TABLET, DELAYED RELEASE ORAL
Qty: 90 TABLET | Refills: 1 | Status: SHIPPED | OUTPATIENT
Start: 2024-06-11

## 2024-06-11 RX ORDER — ATORVASTATIN CALCIUM 40 MG/1
40 TABLET, FILM COATED ORAL EVERY EVENING
Qty: 90 TABLET | Refills: 1 | Status: SHIPPED | OUTPATIENT
Start: 2024-06-11

## 2024-06-11 RX ORDER — SUCRALFATE 1 G/1
1 TABLET ORAL
Qty: 120 TABLET | Refills: 0 | Status: SHIPPED | OUTPATIENT
Start: 2024-06-11

## 2024-06-11 NOTE — PATIENT INSTRUCTIONS
Please get your labwork done fasting.  Do not eat/drink for about 8-12 hours prior to getting the labwork done, however you may drink water or black coffee while you are fasting.  Please use a [facility] lab if possible, as we receive the lab results more quickly.  We will notify you of your labwork results even if they are normal.  Please contact us if you do not hear about your lab results after one week.  Please call vascular. Neurology- ask for appt ASAP  Due for pneumonia and shingles vaccine  Follow up in 3 months  Call for any questions.    Medicare Preventive Visit Patient Instructions  Thank you for completing your Welcome to Medicare Visit or Medicare Annual Wellness Visit today. Your next wellness visit will be due in one year (6/12/2025).  The screening/preventive services that you may require over the next 5-10 years are detailed below. Some tests may not apply to you based off risk factors and/or age. Screening tests ordered at today's visit but not completed yet may show as past due. Also, please note that scanned in results may not display below.  Preventive Screenings:  Service Recommendations Previous Testing/Comments   Colorectal Cancer Screening  * Colonoscopy    * Fecal Occult Blood Test (FOBT)/Fecal Immunochemical Test (FIT)  * Fecal DNA/Cologuard Test  * Flexible Sigmoidoscopy Age: 45-75 years old   Colonoscopy: every 10 years (may be performed more frequently if at higher risk)  OR  FOBT/FIT: every 1 year  OR  Cologuard: every 3 years  OR  Sigmoidoscopy: every 5 years  Screening may be recommended earlier than age 45 if at higher risk for colorectal cancer. Also, an individualized decision between you and your healthcare provider will decide whether screening between the ages of 76-85 would be appropriate. Colonoscopy: 05/16/2024  FOBT/FIT: Not on file  Cologuard: Not on file  Sigmoidoscopy: Not on file          Breast Cancer Screening Age: 40+ years old  Frequency: every 1-2 years  Not  required if history of left and right mastectomy Mammogram: Not on file        Cervical Cancer Screening Between the ages of 21-29, pap smear recommended once every 3 years.   Between the ages of 30-65, can perform pap smear with HPV co-testing every 5 years.   Recommendations may differ for women with a history of total hysterectomy, cervical cancer, or abnormal pap smears in past. Pap Smear: Not on file    Screening Not Indicated   Hepatitis C Screening Once for adults born between 1945 and 1965  More frequently in patients at high risk for Hepatitis C Hep C Antibody: Not on file        Diabetes Screening 1-2 times per year if you're at risk for diabetes or have pre-diabetes Fasting glucose: No results in last 5 years (No results in last 5 years)  A1C: 5.5 % (5/11/2024)  Screening Current   Cholesterol Screening Once every 5 years if you don't have a lipid disorder. May order more often based on risk factors. Lipid panel: 05/11/2024    Screening Current     Other Preventive Screenings Covered by Medicare:  Abdominal Aortic Aneurysm (AAA) Screening: covered once if your at risk. You're considered to be at risk if you have a family history of AAA.  Lung Cancer Screening: covers low dose CT scan once per year if you meet all of the following conditions: (1) Age 55-77; (2) No signs or symptoms of lung cancer; (3) Current smoker or have quit smoking within the last 15 years; (4) You have a tobacco smoking history of at least 20 pack years (packs per day multiplied by number of years you smoked); (5) You get a written order from a healthcare provider.  Glaucoma Screening: covered annually if you're considered high risk: (1) You have diabetes OR (2) Family history of glaucoma OR (3)  aged 50 and older OR (4)  American aged 65 and older  Osteoporosis Screening: covered every 2 years if you meet one of the following conditions: (1) You're estrogen deficient and at risk for osteoporosis based off  medical history and other findings; (2) Have a vertebral abnormality; (3) On glucocorticoid therapy for more than 3 months; (4) Have primary hyperparathyroidism; (5) On osteoporosis medications and need to assess response to drug therapy.   Last bone density test (DXA Scan): Not on file.  HIV Screening: covered annually if you're between the age of 15-65. Also covered annually if you are younger than 15 and older than 65 with risk factors for HIV infection. For pregnant patients, it is covered up to 3 times per pregnancy.    Immunizations:  Immunization Recommendations   Influenza Vaccine Annual influenza vaccination during flu season is recommended for all persons aged >= 6 months who do not have contraindications   Pneumococcal Vaccine   * Pneumococcal conjugate vaccine = PCV13 (Prevnar 13), PCV15 (Vaxneuvance), PCV20 (Prevnar 20)  * Pneumococcal polysaccharide vaccine = PPSV23 (Pneumovax) Adults 19-63 yo with certain risk factors or if 65+ yo  If never received any pneumonia vaccine: recommend Prevnar 20 (PCV20)  Give PCV20 if previously received 1 dose of PCV13 or PPSV23   Hepatitis B Vaccine 3 dose series if at intermediate or high risk (ex: diabetes, end stage renal disease, liver disease)   Respiratory syncytial virus (RSV) Vaccine - COVERED BY MEDICARE PART D  * RSVPreF3 (Arexvy) CDC recommends that adults 60 years of age and older may receive a single dose of RSV vaccine using shared clinical decision-making (SCDM)   Tetanus (Td) Vaccine - COST NOT COVERED BY MEDICARE PART B Following completion of primary series, a booster dose should be given every 10 years to maintain immunity against tetanus. Td may also be given as tetanus wound prophylaxis.   Tdap Vaccine - COST NOT COVERED BY MEDICARE PART B Recommended at least once for all adults. For pregnant patients, recommended with each pregnancy.   Shingles Vaccine (Shingrix) - COST NOT COVERED BY MEDICARE PART B  2 shot series recommended in those 19 years  and older who have or will have weakened immune systems or those 50 years and older     Health Maintenance Due:      Topic Date Due   • Hepatitis C Screening  Never done     Immunizations Due:      Topic Date Due   • Influenza Vaccine (Season Ended) 09/01/2024     Advance Directives   What are advance directives?  Advance directives are legal documents that state your wishes and plans for medical care. These plans are made ahead of time in case you lose your ability to make decisions for yourself. Advance directives can apply to any medical decision, such as the treatments you want, and if you want to donate organs.   What are the types of advance directives?  There are many types of advance directives, and each state has rules about how to use them. You may choose a combination of any of the following:  Living will:  This is a written record of the treatment you want. You can also choose which treatments you do not want, which to limit, and which to stop at a certain time. This includes surgery, medicine, IV fluid, and tube feedings.   Durable power of  for healthcare (DPAHC):  This is a written record that states who you want to make healthcare choices for you when you are unable to make them for yourself. This person, called a proxy, is usually a family member or a friend. You may choose more than 1 proxy.  Do not resuscitate (DNR) order:  A DNR order is used in case your heart stops beating or you stop breathing. It is a request not to have certain forms of treatment, such as CPR. A DNR order may be included in other types of advance directives.  Medical directive:  This covers the care that you want if you are in a coma, near death, or unable to make decisions for yourself. You can list the treatments you want for each condition. Treatment may include pain medicine, surgery, blood transfusions, dialysis, IV or tube feedings, and a ventilator (breathing machine).  Values history:  This document has  questions about your views, beliefs, and how you feel and think about life. This information can help others choose the care that you would choose.  Why are advance directives important?  An advance directive helps you control your care. Although spoken wishes may be used, it is better to have your wishes written down. Spoken wishes can be misunderstood, or not followed. Treatments may be given even if you do not want them. An advance directive may make it easier for your family to make difficult choices about your care.   Urinary Incontinence   Urinary incontinence (UI)  is when you lose control of your bladder. UI develops because your bladder cannot store or empty urine properly. The 3 most common types of UI are stress incontinence, urge incontinence, or both.  Medicines:   May be given to help strengthen your bladder control. Report any side effects of medication to your healthcare provider.  Do pelvic muscle exercises often:  Your pelvic muscles help you stop urinating. Squeeze these muscles tight for 5 seconds, then relax for 5 seconds. Gradually work up to squeezing for 10 seconds. Do 3 sets of 15 repetitions a day, or as directed. This will help strengthen your pelvic muscles and improve bladder control.  Train your bladder:  Go to the bathroom at set times, such as every 2 hours, even if you do not feel the urge to go. You can also try to hold your urine when you feel the urge to go. For example, hold your urine for 5 minutes when you feel the urge to go. As that becomes easier, hold your urine for 10 minutes.   Self-care:   Keep a UI record.  Write down how often you leak urine and how much you leak. Make a note of what you were doing when you leaked urine.  Drink liquids as directed. You may need to limit the amount of liquid you drink to help control your urine leakage. Do not drink any liquid right before you go to bed. Limit or do not have drinks that contain caffeine or alcohol.   Prevent  constipation.  Eat a variety of high-fiber foods. Good examples are high-fiber cereals, beans, vegetables, and whole-grain breads. Walking is the best way to trigger your intestines to have a bowel movement.  Exercise regularly and maintain a healthy weight.  Weight loss and exercise will decrease pressure on your bladder and help you control your leakage.   Use a catheter as directed  to help empty your bladder. A catheter is a tiny, plastic tube that is put into your bladder to drain your urine.   Go to behavior therapy as directed.  Behavior therapy may be used to help you learn to control your urge to urinate.     © Copyright [employer] 2018 Information is for End User's use only and may not be sold, redistributed or otherwise used for commercial purposes. All illustrations and images included in CareNotes® are the copyrighted property of [employer] or [employer]      Medicare Preventive Visit Patient Instructions  Thank you for completing your Welcome to Medicare Visit or Medicare Annual Wellness Visit today. Your next wellness visit will be due in one year (6/12/2025).  The screening/preventive services that you may require over the next 5-10 years are detailed below. Some tests may not apply to you based off risk factors and/or age. Screening tests ordered at today's visit but not completed yet may show as past due. Also, please note that scanned in results may not display below.  Preventive Screenings:  Service Recommendations Previous Testing/Comments   Colorectal Cancer Screening  * Colonoscopy    * Fecal Occult Blood Test (FOBT)/Fecal Immunochemical Test (FIT)  * Fecal DNA/Cologuard Test  * Flexible Sigmoidoscopy Age: 45-75 years old   Colonoscopy: every 10 years (may be performed more frequently if at higher risk)  OR  FOBT/FIT: every 1 year  OR  Cologuard: every 3 years  OR  Sigmoidoscopy: every 5 years  Screening may be recommended earlier than age 45 if at higher risk for colorectal  cancer. Also, an individualized decision between you and your healthcare provider will decide whether screening between the ages of 76-85 would be appropriate. Colonoscopy: 05/16/2024  FOBT/FIT: Not on file  Cologuard: Not on file  Sigmoidoscopy: Not on file          Breast Cancer Screening Age: 40+ years old  Frequency: every 1-2 years  Not required if history of left and right mastectomy Mammogram: Not on file        Cervical Cancer Screening Between the ages of 21-29, pap smear recommended once every 3 years.   Between the ages of 30-65, can perform pap smear with HPV co-testing every 5 years.   Recommendations may differ for women with a history of total hysterectomy, cervical cancer, or abnormal pap smears in past. Pap Smear: Not on file    Screening Not Indicated   Hepatitis C Screening Once for adults born between 1945 and 1965  More frequently in patients at high risk for Hepatitis C Hep C Antibody: Not on file        Diabetes Screening 1-2 times per year if you're at risk for diabetes or have pre-diabetes Fasting glucose: No results in last 5 years (No results in last 5 years)  A1C: 5.5 % (5/11/2024)  Screening Current   Cholesterol Screening Once every 5 years if you don't have a lipid disorder. May order more often based on risk factors. Lipid panel: 05/11/2024    Screening Current     Other Preventive Screenings Covered by Medicare:  Abdominal Aortic Aneurysm (AAA) Screening: covered once if your at risk. You're considered to be at risk if you have a family history of AAA.  Lung Cancer Screening: covers low dose CT scan once per year if you meet all of the following conditions: (1) Age 55-77; (2) No signs or symptoms of lung cancer; (3) Current smoker or have quit smoking within the last 15 years; (4) You have a tobacco smoking history of at least 20 pack years (packs per day multiplied by number of years you smoked); (5) You get a written order from a healthcare provider.  Glaucoma Screening: covered  annually if you're considered high risk: (1) You have diabetes OR (2) Family history of glaucoma OR (3)  aged 50 and older OR (4)  American aged 65 and older  Osteoporosis Screening: covered every 2 years if you meet one of the following conditions: (1) You're estrogen deficient and at risk for osteoporosis based off medical history and other findings; (2) Have a vertebral abnormality; (3) On glucocorticoid therapy for more than 3 months; (4) Have primary hyperparathyroidism; (5) On osteoporosis medications and need to assess response to drug therapy.   Last bone density test (DXA Scan): Not on file.  HIV Screening: covered annually if you're between the age of 15-65. Also covered annually if you are younger than 15 and older than 65 with risk factors for HIV infection. For pregnant patients, it is covered up to 3 times per pregnancy.    Immunizations:  Immunization Recommendations   Influenza Vaccine Annual influenza vaccination during flu season is recommended for all persons aged >= 6 months who do not have contraindications   Pneumococcal Vaccine   * Pneumococcal conjugate vaccine = PCV13 (Prevnar 13), PCV15 (Vaxneuvance), PCV20 (Prevnar 20)  * Pneumococcal polysaccharide vaccine = PPSV23 (Pneumovax) Adults 19-63 yo with certain risk factors or if 65+ yo  If never received any pneumonia vaccine: recommend Prevnar 20 (PCV20)  Give PCV20 if previously received 1 dose of PCV13 or PPSV23   Hepatitis B Vaccine 3 dose series if at intermediate or high risk (ex: diabetes, end stage renal disease, liver disease)   Respiratory syncytial virus (RSV) Vaccine - COVERED BY MEDICARE PART D  * RSVPreF3 (Arexvy) CDC recommends that adults 60 years of age and older may receive a single dose of RSV vaccine using shared clinical decision-making (SCDM)   Tetanus (Td) Vaccine - COST NOT COVERED BY MEDICARE PART B Following completion of primary series, a booster dose should be given every 10 years to maintain  immunity against tetanus. Td may also be given as tetanus wound prophylaxis.   Tdap Vaccine - COST NOT COVERED BY MEDICARE PART B Recommended at least once for all adults. For pregnant patients, recommended with each pregnancy.   Shingles Vaccine (Shingrix) - COST NOT COVERED BY MEDICARE PART B  2 shot series recommended in those 19 years and older who have or will have weakened immune systems or those 50 years and older     Health Maintenance Due:      Topic Date Due   • Hepatitis C Screening  Never done     Immunizations Due:      Topic Date Due   • Influenza Vaccine (Season Ended) 09/01/2024     Advance Directives   What are advance directives?  Advance directives are legal documents that state your wishes and plans for medical care. These plans are made ahead of time in case you lose your ability to make decisions for yourself. Advance directives can apply to any medical decision, such as the treatments you want, and if you want to donate organs.   What are the types of advance directives?  There are many types of advance directives, and each state has rules about how to use them. You may choose a combination of any of the following:  Living will:  This is a written record of the treatment you want. You can also choose which treatments you do not want, which to limit, and which to stop at a certain time. This includes surgery, medicine, IV fluid, and tube feedings.   Durable power of  for healthcare (DPAHC):  This is a written record that states who you want to make healthcare choices for you when you are unable to make them for yourself. This person, called a proxy, is usually a family member or a friend. You may choose more than 1 proxy.  Do not resuscitate (DNR) order:  A DNR order is used in case your heart stops beating or you stop breathing. It is a request not to have certain forms of treatment, such as CPR. A DNR order may be included in other types of advance directives.  Medical directive:   This covers the care that you want if you are in a coma, near death, or unable to make decisions for yourself. You can list the treatments you want for each condition. Treatment may include pain medicine, surgery, blood transfusions, dialysis, IV or tube feedings, and a ventilator (breathing machine).  Values history:  This document has questions about your views, beliefs, and how you feel and think about life. This information can help others choose the care that you would choose.  Why are advance directives important?  An advance directive helps you control your care. Although spoken wishes may be used, it is better to have your wishes written down. Spoken wishes can be misunderstood, or not followed. Treatments may be given even if you do not want them. An advance directive may make it easier for your family to make difficult choices about your care.   Urinary Incontinence   Urinary incontinence (UI)  is when you lose control of your bladder. UI develops because your bladder cannot store or empty urine properly. The 3 most common types of UI are stress incontinence, urge incontinence, or both.  Medicines:   May be given to help strengthen your bladder control. Report any side effects of medication to your healthcare provider.  Do pelvic muscle exercises often:  Your pelvic muscles help you stop urinating. Squeeze these muscles tight for 5 seconds, then relax for 5 seconds. Gradually work up to squeezing for 10 seconds. Do 3 sets of 15 repetitions a day, or as directed. This will help strengthen your pelvic muscles and improve bladder control.  Train your bladder:  Go to the bathroom at set times, such as every 2 hours, even if you do not feel the urge to go. You can also try to hold your urine when you feel the urge to go. For example, hold your urine for 5 minutes when you feel the urge to go. As that becomes easier, hold your urine for 10 minutes.   Self-care:   Keep a UI record.  Write down how often you leak  urine and how much you leak. Make a note of what you were doing when you leaked urine.  Drink liquids as directed. You may need to limit the amount of liquid you drink to help control your urine leakage. Do not drink any liquid right before you go to bed. Limit or do not have drinks that contain caffeine or alcohol.   Prevent constipation.  Eat a variety of high-fiber foods. Good examples are high-fiber cereals, beans, vegetables, and whole-grain breads. Walking is the best way to trigger your intestines to have a bowel movement.  Exercise regularly and maintain a healthy weight.  Weight loss and exercise will decrease pressure on your bladder and help you control your leakage.   Use a catheter as directed  to help empty your bladder. A catheter is a tiny, plastic tube that is put into your bladder to drain your urine.   Go to behavior therapy as directed.  Behavior therapy may be used to help you learn to control your urge to urinate.     © Copyright "[employer] 2018 Information is for End User's use only and may not be sold, redistributed or otherwise used for commercial purposes. All illustrations and images included in CareNotes® are the copyrighted property of [employer] or [employer]      Kegel Exercises for Women   AMBULATORY CARE:   Kegel exercises  help strengthen your pelvic muscles. Pelvic muscles hold your pelvic organs, such as your bladder and uterus, in place. Kegel exercises help prevent or control certain conditions, such as urine incontinence (leakage) or uterine prolapse.       Call your doctor or physical therapist if:   You cannot feel your muscles tighten or relax.    You continue to leak urine.    You have questions or concerns about your condition or care.    Use the correct muscles:  Pelvic muscles are the muscles you use to control urine flow. To target these muscles, stop and start the flow of urine several times. This will help you become familiar with how it feels to  tighten and relax these muscles.  How to do Kegel exercises:   Get into a comfortable position.  You may lie down, stand up, or sit down to do these exercises. When you first try to do these exercises, it may be easier if you lie down.    Tighten or squeeze your pelvic muscles slowly.  It may feel like you are trying to hold back urine or gas. Hold this position for 3 seconds. Relax for 3 seconds. Repeat this cycle 10 times. Do not hold your breath when you do Kegel exercises. Keep your stomach, back, and leg muscles relaxed.    Do 10 sets of Kegel exercises, at least 3 times a day.  When you know how to do Kegel exercises, use different positions. This will help to strengthen your pelvic muscles as much as possible. You can do these exercises while you lie on the floor, watch TV, or while you stand. Tighten your pelvic muscles before you sneeze, cough, or lift to prevent urine leakage. You may notice improved bladder control within about 6 weeks.    Follow up with your doctor or physical therapist as directed:  Write down your questions so you remember to ask them during your visits.  © Copyright [employer] 2023 Information is for End User's use only and may not be sold, redistributed or otherwise used for commercial purposes.  The above information is an  only. It is not intended as medical advice for individual conditions or treatments. Talk to your doctor, nurse or pharmacist before following any medical regimen to see if it is safe and effective for you.

## 2024-06-11 NOTE — PROGRESS NOTES
Ambulatory Visit  Name: Agatha Villeda      : 1945      MRN: [id number]  Encounter Provider: Nasrin Garcia MD  Encounter Date: 2024   Encounter department: [facility]    Assessment & Plan   1. Medicare annual wellness visit, subsequent  -     CBC and differential; Future  -     Comprehensive metabolic panel; Future  -     TSH, 3rd generation with Free T4 reflex; Future  -     Vitamin D 25 hydroxy; Future  2. Need for hepatitis C screening test  -     Hepatitis C antibody; Future  -     CBC and differential; Future  -     Comprehensive metabolic panel; Future  -     TSH, 3rd generation with Free T4 reflex; Future  -     Vitamin D 25 hydroxy; Future  3. Asymptomatic postmenopausal state  -     DXA bone density spine hip and pelvis; Future; Expected date: 2024  -     CBC and differential; Future  -     Comprehensive metabolic panel; Future  -     TSH, 3rd generation with Free T4 reflex; Future  -     Vitamin D 25 hydroxy; Future  4. Acute CVA (cerebrovascular accident) (HCC)  -     CBC and differential; Future  -     Comprehensive metabolic panel; Future  -     TSH, 3rd generation with Free T4 reflex; Future  -     Vitamin D 25 hydroxy; Future  5. Thrombocytosis  -     CBC and differential; Future  -     Comprehensive metabolic panel; Future  -     TSH, 3rd generation with Free T4 reflex; Future  -     Vitamin D 25 hydroxy; Future  6. Acute anemia  -     CBC and differential; Future  -     Comprehensive metabolic panel; Future  -     TSH, 3rd generation with Free T4 reflex; Future  -     Vitamin D 25 hydroxy; Future  7. Screening for thyroid disorder  -     CBC and differential; Future  -     Comprehensive metabolic panel; Future  -     TSH, 3rd generation with Free T4 reflex; Future  -     Vitamin D 25 hydroxy; Future  8. Vitamin D deficiency  -     Vitamin D 25 hydroxy; Future  9. Stroke (HCC)  -     amLODIPine (NORVASC) 5 mg tablet; Take 1 tablet (5 mg total) by mouth  daily  -     atorvastatin (LIPITOR) 40 mg tablet; Take 1 tablet (40 mg total) by mouth every evening  -     clopidogrel (PLAVIX) 75 mg tablet; Take 1 tablet (75 mg total) by mouth daily  -     pantoprazole (PROTONIX) 40 mg tablet; Take 1 tablet (40 mg total) by mouth daily in the early morning  -     sucralfate (CARAFATE) 1 g tablet; Take 1 tablet (1 g total) by mouth 4 (four) times a day (before meals and at bedtime)  10. Urinary incontinence, unspecified type     Preventive health issues were discussed with patient, and age appropriate screening tests were ordered as noted in patient's After Visit Summary. Personalized health advice and appropriate referrals for health education or preventive services given if needed, as noted in patient's After Visit Summary.    History of Present Illness     HPI   79-year-old female here to establish and also for a Medicare visit.  Patient was recently hospital lysed for acute CVA.  She is also found to have acute anemia at that time, iron deficiency.  Patient does have follow-up with both GI and hematology.  Patient is to follow-up with neurology and vascular due to some carotid stenosis.  Patient has not been to a primary care physician for most of her life.  Health maintenance was reviewed.  Order given for labs and also DEXA scan.  Patient did have colonoscopy in the hospital.  Patient denies any history of anxiety or depression.  Has minimal urinary incontinence.  Discussed Kegel exercises and scheduled bathroom breaks  Hospital records reviewed.  Will see back in 3 months.  All questions answered today.    PHQ-2/9 Depression Screening    Little interest or pleasure in doing things: 0 - not at all  Feeling down, depressed, or hopeless: 0 - not at all  PHQ-2 Score: 0  PHQ-2 Interpretation: Negative depression screen         Patient Care Team:  Nasrin Garcia MD as PCP - General (Family Medicine)    Review of Systems   Constitutional: Negative.  Negative for chills and  fever.   HENT: Negative.  Negative for ear pain and sore throat.    Eyes:  Negative for pain and visual disturbance.   Respiratory: Negative.  Negative for cough and shortness of breath.    Cardiovascular: Negative.  Negative for chest pain and palpitations.   Gastrointestinal: Negative.  Negative for abdominal pain and vomiting.   Genitourinary: Negative.  Negative for dysuria and hematuria.   Musculoskeletal:  Negative for arthralgias and back pain.   Skin:  Negative for color change and rash.   Neurological: Negative.  Negative for seizures and syncope.   Psychiatric/Behavioral: Negative.       Medical History Reviewed by provider this encounter:  Tobacco  Allergies  Meds  Problems  Med Hx  Surg Hx  Fam Hx       Annual Wellness Visit Questionnaire   Agatha is here for her Subsequent Wellness visit.     Health Risk Assessment:   Patient rates overall health as good. Patient feels that their physical health rating is slightly worse. Patient is very satisfied with their life. Eyesight was rated as same. Hearing was rated as same. Patient feels that their emotional and mental health rating is same. Patients states they are never, rarely angry. Patient states they are never, rarely unusually tired/fatigued. Pain experienced in the last 7 days has been none. Patient states that she has experienced no weight loss or gain in last 6 months.     Depression Screening:   PHQ-2 Score: 0      Fall Risk Screening:   In the past year, patient has experienced: no history of falling in past year      Urinary Incontinence Screening:   Patient has leaked urine accidently in the last six months.     Home Safety:  Patient does not have trouble with stairs inside or outside of their home. Patient has working smoke alarms and has working carbon monoxide detector. Home safety hazards include: none.     Nutrition:   Current diet is Regular and No Added Salt.     Medications:   Patient is not currently taking any over-the-counter  supplements. Patient is able to manage medications.     Activities of Daily Living (ADLs)/Instrumental Activities of Daily Living (IADLs):   Walk and transfer into and out of bed and chair?: Yes  Dress and groom yourself?: Yes    Bathe or shower yourself?: Yes    Feed yourself? Yes  Do your laundry/housekeeping?: Yes  Manage your money, pay your bills and track your expenses?: Yes  Make your own meals?: Yes    Do your own shopping?: Yes    Previous Hospitalizations:   Any hospitalizations or ED visits within the last 12 months?: Yes    How many hospitalizations have you had in the last year?: 1-2    Advance Care Planning:   Living will: No      PREVENTIVE SCREENINGS      Cardiovascular Screening:    General: Screening Current      Diabetes Screening:     General: Screening Current      Cervical Cancer Screening:    General: Screening Not Indicated      Lung Cancer Screening:     General: Screening Not Indicated    Screening, Brief Intervention, and Referral to Treatment (SBIRT)    Screening    Typical number of drinks in a week: 0    Single Item Drug Screening:  How often have you used an illegal drug (including marijuana) or a prescription medication for non-medical reasons in the past year? never    Single Item Drug Screen Score: 0  Interpretation: Negative screen for possible drug use disorder    Social Determinants of Health     Food Insecurity: No Food Insecurity (6/11/2024)    Hunger Vital Sign     Worried About Running Out of Food in the Last Year: Never true     Ran Out of Food in the Last Year: Never true   Transportation Needs: No Transportation Needs (6/11/2024)    PRAPARE - Transportation     Lack of Transportation (Medical): No     Lack of Transportation (Non-Medical): No   Housing Stability: Low Risk  (6/11/2024)    Housing Stability Vital Sign     Unable to Pay for Housing in the Last Year: No     Number of Times Moved in the Last Year: 1     Homeless in the Last Year: No   Utilities: Not At Risk  (6/11/2024)    [facility] Utilities     Threatened with loss of utilities: No     No results found.    Objective     /78 (BP Location: Left arm, Patient Position: Sitting, Cuff Size: Adult)   Pulse 89   Temp 97.7 °F (36.5 °C) (Temporal)   Ht 5' (1.524 m)   Wt 51.7 kg (114 lb)   SpO2 95%   BMI 22.26 kg/m²     Physical Exam  Vitals and nursing note reviewed.   Constitutional:       General: She is not in acute distress.     Appearance: She is well-developed.   HENT:      Head: Normocephalic and atraumatic.      Right Ear: Tympanic membrane normal.      Left Ear: Tympanic membrane normal.      Nose: Nose normal.      Mouth/Throat:      Mouth: Mucous membranes are moist.   Eyes:      Extraocular Movements: Extraocular movements intact.      Conjunctiva/sclera: Conjunctivae normal.   Cardiovascular:      Rate and Rhythm: Normal rate and regular rhythm.      Heart sounds: No murmur heard.  Pulmonary:      Effort: Pulmonary effort is normal. No respiratory distress.      Breath sounds: Normal breath sounds.   Abdominal:      General: Bowel sounds are normal.      Palpations: Abdomen is soft.      Tenderness: There is no abdominal tenderness.   Musculoskeletal:         General: No swelling.      Cervical back: Neck supple.   Skin:     General: Skin is warm and dry.      Capillary Refill: Capillary refill takes less than 2 seconds.   Neurological:      General: No focal deficit present.      Mental Status: She is alert and oriented to person, place, and time.      Cranial Nerves: No cranial nerve deficit.   Psychiatric:         Mood and Affect: Mood normal.         Behavior: Behavior normal.       Administrative Statements

## 2024-06-14 LAB
25(OH)D3+25(OH)D2 SERPL-MCNC: 39 NG/ML (ref 30–100)
ALBUMIN SERPL-MCNC: 4.1 G/DL (ref 3.5–5.7)
ALP SERPL-CCNC: 108 U/L (ref 35–120)
ALT SERPL-CCNC: 48 U/L
ANION GAP SERPL CALCULATED.3IONS-SCNC: 11 MMOL/L (ref 3–11)
AST SERPL-CCNC: 44 U/L
BILIRUB SERPL-MCNC: 0.4 MG/DL (ref 0.2–1)
BUN SERPL-MCNC: 14 MG/DL (ref 7–25)
CALCIUM SERPL-MCNC: 9.4 MG/DL (ref 8.5–10.1)
CHLORIDE SERPL-SCNC: 104 MMOL/L (ref 100–109)
CO2 SERPL-SCNC: 25 MMOL/L (ref 21–31)
CREAT SERPL-MCNC: 0.76 MG/DL (ref 0.4–1.1)
CYTOLOGY CMNT CVX/VAG CYTO-IMP: ABNORMAL
GFR/BSA.PRED SERPLBLD CYS-BASED-ARV: 80 ML/MIN/{1.73_M2}
GLUCOSE SERPL-MCNC: 94 MG/DL (ref 65–99)
HCV AB SERPL QL IA: NONREACTIVE
POTASSIUM SERPL-SCNC: 4 MMOL/L (ref 3.5–5.2)
PROT SERPL-MCNC: 6.3 G/DL (ref 6.3–8.3)
SODIUM SERPL-SCNC: 140 MMOL/L (ref 135–145)
TSH SERPL-ACNC: 1.42 UIU/ML (ref 0.45–5.33)

## 2024-06-15 LAB
ANISOCYTOSIS BLD QL SMEAR: ABNORMAL
BASOPHILS # BLD AUTO: 0.1 THOU/CMM (ref 0–0.1)
BASOPHILS NFR BLD AUTO: 1 %
DIFFERENTIAL METHOD BLD: ABNORMAL
EOSINOPHIL # BLD AUTO: 0.6 THOU/CMM (ref 0–0.5)
EOSINOPHIL NFR BLD AUTO: 5 %
ERYTHROCYTE [DISTWIDTH] IN BLOOD BY AUTOMATED COUNT: 30.5 % (ref 12–16)
HCT VFR BLD AUTO: 35.9 % (ref 35–43)
HGB BLD-MCNC: 11.6 G/DL (ref 11.5–14.5)
HYPOCHROMIA BLD QL SMEAR: ABNORMAL
LYMPHOCYTES # BLD AUTO: 1 THOU/CMM (ref 1–3)
LYMPHOCYTES NFR BLD AUTO: 9 %
MCH RBC QN AUTO: 25.7 PG (ref 26–34)
MCHC RBC AUTO-ENTMCNC: 32.3 G/DL (ref 32–37)
MCV RBC AUTO: 80 FL (ref 80–100)
MONOCYTES # BLD AUTO: 0.6 THOU/CMM (ref 0.3–1)
MONOCYTES NFR BLD AUTO: 6 %
MORPHOLOGY BLD-IMP: ABNORMAL
NEUTROPHILS # BLD AUTO: 8.5 THOU/CMM (ref 1.8–7.8)
NEUTROPHILS NFR BLD AUTO: 79 %
OVALOCYTES BLD QL SMEAR: SLIGHT
PLATELET # BLD AUTO: 272 THOU/CMM (ref 140–350)
PMV BLD REES-ECKER: 8.8 FL (ref 7.5–11.3)
POIKILOCYTOSIS BLD QL SMEAR: ABNORMAL
POLYCHROMASIA BLD QL SMEAR: ABNORMAL
RBC # BLD AUTO: 4.51 MILL/CMM (ref 3.7–4.7)
WBC # BLD AUTO: 10.7 THOU/CMM (ref 4–10)

## 2024-06-20 ENCOUNTER — OFFICE VISIT (OUTPATIENT)
Dept: PHYSICAL THERAPY | Facility: CLINIC | Age: 79
End: 2024-06-20
Payer: MEDICARE

## 2024-06-20 DIAGNOSIS — I63.9 CEREBROVASCULAR ACCIDENT (CVA), UNSPECIFIED MECHANISM (HCC): Primary | ICD-10-CM

## 2024-06-20 DIAGNOSIS — R26.2 AMBULATORY DYSFUNCTION: ICD-10-CM

## 2024-06-20 PROCEDURE — 97112 NEUROMUSCULAR REEDUCATION: CPT

## 2024-06-20 PROCEDURE — 97530 THERAPEUTIC ACTIVITIES: CPT

## 2024-06-20 NOTE — PROGRESS NOTES
"Daily Note     Today's date: 2024  Patient name: Agatha Villeda  : 1945  MRN: [id number]  Referring provider: Nai Cox MD  Dx:   Encounter Diagnosis     ICD-10-CM    1. Cerebrovascular accident (CVA), unspecified mechanism (HCC)  I63.9       2. Ambulatory dysfunction  R26.2           Start Time: 1115  Stop Time: 1205  Total time in clinic (min): 50 minutes    Subjective: Saw her PCP who recommended she continue c/ PT for at least a few more visits to gain strength and balance. Really only has issues c/ balance occasionally in her garden when the ground is uneven; however, denies any falls. Is not using AD for ambulation at this time.       Objective: See treatment diary below      Assessment: Tolerated treatment well. Patient demonstrated fatigue post treatment, exhibited good technique with therapeutic exercises, and would benefit from continued PT. Challenged appropriately by balance activities requiring CSPV/CGA prn. Introduced some hand-eye training in combination c/ balance and ambulation exercises to promote improved vestibular function.       Plan: Continue per plan of care.      Precautions: CVA 24, RUE DVT on Blood thinners, HTN, Fall risk (Gait Belt)    Daily Treatment Diary:      Initial Evaluation Date: 24  POC Expiration: 24  Compliance 24           Visit Number 1 2  3  4  5  6           Re-Eval  IE                    Foto Captured Y                         Date    Manuals                                Neuro Re-Ed        NBOS c/ Foam  30\"/1 DC     Tandem Walk        Tandem        SLS        Sidestepping GTB 4 laps  nv Mirror 4 laps Mirror  4 laps Mirror 4 laps   Gait c/ Head Turns & Nods  50'/2 no AD PT CGA 50'/2 no AD PT CGA 50'x2 no AD PT CGA 50'/2  no AD PT CGA   Stop/Start Gait        Marching c/ Pause hold in SLS 50'/2 no AD PT CSPV 50'/2 no AD PT CGA 50'/2 no AD PT CGA 50'x2 no AD PT CGA 50'/2 no AD " "PT CGA    Biodex L5-8 postural stability 1'x2, motor control 1'x2 Static-L5 Postural Stability 1'/2 L3-5 Postural Stability 1'/2 L3-5 postural stability 1'x2 L5-8 postural stability 1'x2, motor control 1'x2   Rotational Wall Ball Taps Football 1'                Ther Ex        Gastroc Stretch  np      LAQ HEP nv 5\" x15 ea 5\" 2x10 ea 2#  5\" 2/10ea   HC HEP nv Stand 2/10 Stand 2x10 Stand 2# 2/10   Bridge HEP HEP   Reviewed for HEP 2/10   Ball Squeeze  HEP      TB Supine Clams  HEP      SLR  HEP      TKE        HR HEP 2/10 2/10 2x10 2/10   Seated TR HEP 3\"x20 3\"x20 3\"x20 3\"/20   Bent Over Extension        Stand: Abd HEP 2/10  2/10 2x10 2/10 2#   Stand: March HEP 2/10 2/10 2x10 2/10   Leg Press        Piriformis Stretch        NuStep L1 7' UE & LE 6' L1 UE & LE 6' L1  UE & LE L1 7' UE & LE L1 7' UE & LE   Education                Ther Activity        Step-ups: Fwd 8\" x15ea nv 6\" x10 ea 6\" 10x ea  nv   Weighted Carry 5# U/l c/ Sudden turns 75'/2 5# DB b/l 50'/2 5# DB b/l 50'/2 5# DB [place] 50'x2 5# DB B/l 50'/2   Sit to Stands 1/10, Yellow Ball 1/10 1/10, 1/7 2/10   2x10 2/10   Wall Squat        Floor Transfer        Lunges        Obstacle Course Various Cusions x5 30'/4 + Picking up Wt'd Objects 30'/2    Airex Cushions x5 Varying levels 30'/4   Ball Bouncing Toss v. Bounce 50'/4               Gait Training        Ambulation        Stairs                Modalities        CP                 Access Code: [email or web address]  URL: [email or web address]  Date: 05/21/2024  Prepared by: Ninfa Ritchie    Exercises  - Hooklying Clamshell with Resistance  - 1 x daily - 7 x weekly - 20-30 reps - 5 seconds hold  - Supine Hip Adduction Isometric with Ball  - 1 x daily - 7 x weekly - 2-3 sets - 10 reps  - Active Straight Leg Raise with Quad Set  - 1 x daily - 7 x weekly - 2-3 sets - 6-10 reps  - Supine Bridge  - 1 x daily - 7 x weekly - 2-3 sets - 6-10 reps  - Seated Toe Raise  - 1 x daily - 7 x weekly - 10-20 reps - 3-5 seconds hold  - " Long Sitting Calf Stretch with Strap  - 3 x daily - 7 x weekly - 4 reps - 30 seconds hold

## 2024-06-27 ENCOUNTER — OFFICE VISIT (OUTPATIENT)
Dept: PHYSICAL THERAPY | Facility: CLINIC | Age: 79
End: 2024-06-27
Payer: MEDICARE

## 2024-06-27 DIAGNOSIS — I63.9 CEREBROVASCULAR ACCIDENT (CVA), UNSPECIFIED MECHANISM (HCC): Primary | ICD-10-CM

## 2024-06-27 DIAGNOSIS — R26.2 AMBULATORY DYSFUNCTION: ICD-10-CM

## 2024-06-27 PROCEDURE — 97112 NEUROMUSCULAR REEDUCATION: CPT

## 2024-06-27 PROCEDURE — 97530 THERAPEUTIC ACTIVITIES: CPT

## 2024-06-27 NOTE — PROGRESS NOTES
"PT Re-Evaluation  and PT Discharge    Today's date: 2024  Patient name: Agatha Villeda  : 1945  MRN: [id number]  Referring provider: Nai Cox MD  Dx:   Encounter Diagnosis     ICD-10-CM    1. Cerebrovascular accident (CVA), unspecified mechanism (HCC)  I63.9       2. Ambulatory dysfunction  R26.2             Start Time: 1015  Stop Time: 1105  Total time in clinic (min): 50 minutes    Assessment  Impairments: abnormal gait, abnormal movement, activity intolerance, impaired balance, impaired physical strength, lacks appropriate home exercise program, poor body mechanics, participation limitations, activity limitations and endurance    Assessment details: Patient is a 79 y.o. female s/p CVA. Following a thorough physical therapy evaluation, the patient demonstrates objective impairments in gait mechanics, balance, and RLE strength. Patient continues to demonstrate progress in pain and function as evidenced by significant improvement in LE strength especially R side. Also met and exceeded MCID for both five time sit to stand and TUG testing, testing is now below fall risk threshold. Patient has met LTGs are this time and will benefit from transition to independent HEP. Patient in agreement c/ POC. She is discharged from PT at this time, will contact us for further care as indicated.     Goals  STG (3 weeks):  Increase LE strength by 1/2 grade. Met.  Maintain tandem balance for 10\". Met.    LTG (6 weeks):  LE Strength >=4+/5. Met.  Five time sit to stand <=12.6\"  Met.  TUG <=9\" Met.  Patient will be independent with HEP in 6 weeks to allow independent management of condition.  Met.    Plan    Planned therapy interventions: home exercise program    Plan details: D/c patient to independent management c/ HEP.           Subjective Evaluation    History of Present Illness  Mechanism of injury: IE: Patient is a 79 y.o. female presenting s/p CVA while on a cruise ship in [place] on 24. She " "presented to the ED upon return to the [place] and was hospitalized for 1 week. She reports she had sudden RLE weakness, stiffness in LUE, and was off balance. Since then, report she still has some weakness and dragging of RLE and still a little off balance from that. She lives alone, 4 OMAYRA c/ rails on both sides in front and 6-7 OMAYRA c/ B rails in back, which she typically uses. No stairs within. Presents currently using Rollator walker, but was ambulated s/ AD prior to CVA. Patients primary goals for PT are to gardening, mowing the lawn and walk s/ AD. Has f/u scheduled c/ PCP on . (+) DVT in RUE, on blood thinners.     UPDATE RE : Patient reports she feels about 80-90% back to her normal and is back to gardening. Was able to weed her garden this morning without difficulty. Has to be careful with flower beds on a hill, but she always was careful there. Feels ready to discharge to independent [facility] and will f/u if needed.   Patient Goals  Patient goals for therapy: decreased pain, increased motion, increased strength, return to sport/leisure activities and improved balance    Pain  No pain reported    Social Support  Steps to enter house: yes (B HRs)  7  Stairs in house: no   Lives with: alone    Employment status: not working        Objective      Gait: Ambulates s/ AD, good technique, moderate gait speed.      -Stairs: Step over step sometimes B Hrs other times no HRs     Balance:  RE ()  -NBOS: 30\"  -Modified Tandem (RFF/LFF): 30\"/30\"  -Tandem (RFF/LFF): 30\"/30\"    IE  -NBOS: 30\"  -Modified Tandem (RFF/LFF): 30\"/30\"  -Tandem (RFF/LFF): 20\"/4\"     Functional Tests:RE ()  Five Time Sit to Stand: 9.18\", no UE use  TU\" s/ AD, no UE use    IE  Five Time Sit to Stand: 14\"  TU\" c/ Rollator, Ues to push up from chair            17\" s/ AD, UE push up from chair (PT CGA)     LE Strength (R/L):     IE  RE ()   Hip Flexion:   4- / 4+  4+/5  Hip Abduction:  4- / 4  4+/4+  Knee Extension:  4 / " "5  4+/5  Knee Flexion:   4- / 4  4/4+  Ankle Dorsiflexion:  4- / 4+  5/5  Ankle Plantarflexion:  4 / 4-   4+/4        Precautions: CVA 5/6/24, RUE DVT on Blood thinners, HTN    Daily Treatment Diary:      Initial Evaluation Date: 05/21/24  POC Expiration: 7/2/24  Compliance 05/21/24 5/22 5/29  6/4 6/6 6/20 6/27         Visit Number 1 2  3  4  5  6 7          Re-Eval  IE           RE/DC      MC   Foto Captured Y                         Date 6/20 6/27 5/29 6/4 6/6   Manuals                                Neuro Re-Ed        NBOS c/ Foam   DC     Tandem Walk        Tandem  30\"/2      SLS        Sidestepping GTB 4 laps  np Mirror 4 laps Mirror  4 laps Mirror 4 laps   Gait c/ Head Turns & Nods   50'/2 no AD PT CGA 50'x2 no AD PT CGA 50'/2  no AD PT CGA   Stop/Start Gait        Marching c/ Pause hold in SLS 50'/2 no AD PT CSPV  50'/2 no AD PT CGA 50'x2 no AD PT CGA 50'/2 no AD PT CGA    Biodex L5-8 postural stability 1'x2, motor control 1'x2 Static-L5 Postural Stability 1'/2 L8-9, Motor Control 1'/2 L2-3 L3-5 Postural Stability 1'/2 L3-5 postural stability 1'x2 L5-8 postural stability 1'x2, motor control 1'x2   Rotational Wall Ball Taps Football 1'                Ther Ex        Gastroc Stretch  np      LAQ HEP nv 5\" x15 ea 5\" 2x10 ea 2#  5\" 2/10ea   HC HEP nv Stand 2/10 Stand 2x10 Stand 2# 2/10   Bridge HEP HEP   Reviewed for HEP 2/10   Ball Squeeze  HEP      TB Supine Clams  HEP      SLR  HEP      TKE        HR HEP  2/10 2x10 2/10   Seated TR HEP  3\"x20 3\"x20 3\"/20   Bent Over Extension        Stand: Abd HEP  2/10 2x10 2/10 2#   Stand: March HEP  2/10 2x10 2/10   Leg Press        Piriformis Stretch        NuStep L1 7' UE & LE 7' L1 UE & LE 6' L1  UE & LE L1 7' UE & LE L1 7' UE & LE   Education                Ther Activity        Step-ups: Fwd 8\" x15ea nv 6\" x10 ea 6\" 10x ea  nv   Weighted Carry 5# U/l c/ Sudden turns 75'/2 5# DB b/l 50'/2 5# DB b/l 50'/2 5# DB [place] 50'x2 5# DB B/l 50'/2   Sit to Stands 1/10, Yellow " Ball 1/10 2/5 2/10   2x10 2/10   Wall Squat        Floor Transfer        Lunges        Obstacle Course Various Cusions x5 30'/4 + Picking up Wt'd Objects 30'/2    Airex Cushions x5 Varying levels 30'/4   Ball Bouncing Toss v. Bounce 50'/4       TUG Testing  x2'              Gait Training        Ambulation        Stairs                Modalities        CP                 Access Code: [email or web address]  URL: [email or web address]  Date: 05/21/2024  Prepared by: Ninfa Ritchie    Exercises  - Hooklying Clamshell with Resistance  - 1 x daily - 7 x weekly - 20-30 reps - 5 seconds hold  - Supine Hip Adduction Isometric with Ball  - 1 x daily - 7 x weekly - 2-3 sets - 10 reps  - Active Straight Leg Raise with Quad Set  - 1 x daily - 7 x weekly - 2-3 sets - 6-10 reps  - Supine Bridge  - 1 x daily - 7 x weekly - 2-3 sets - 6-10 reps  - Seated Toe Raise  - 1 x daily - 7 x weekly - 10-20 reps - 3-5 seconds hold  - Long Sitting Calf Stretch with Strap  - 3 x daily - 7 x weekly - 4 reps - 30 seconds hold

## 2024-06-29 ENCOUNTER — TELEPHONE (OUTPATIENT)
Age: 79
End: 2024-06-29

## 2024-06-29 NOTE — TELEPHONE ENCOUNTER
[facility] / 5/10/2024 - 5/17/2024 (7 days)  [facility] /  Acute CVA (cerebrovascular accident) (HCC)     Disposition:   Home    Used [employer]:    Pt is scheduled on 9/24 at 2pm w/ Dr Stephens. Address was provided. Added to wait list.      Per Notes:    Date of Service: 5/11/2024  7:47 AM    Lamar Núñez MD   Physician  Neurology        Assessment/Plan  Assessment:  Right leg weakness, acute:  MRI brain reveals multi-bi hemispheric infarcts  CTA shows multiple intracranial and extra-cranial stenoses, notably, left carotid artery appears to have about 50% stenosis at origin.  Etiology is cryptogenic at this time, but high likelihood of being cardio-embolic     TTE: technically sufficient study. Mild dilatation of left atrium  LDL 88  HgbA1C pending  BP ranging from 150/70 to 180/90     Of note, plt count >600,000     Plan:  Aspirin 81 mg, and Plavix 75 mg daily  Lipitor 40 mg daily  Loop recorder placement in outpatient  Risk factor optimization including antihypertensive treatment, goal SBP<140 mmHg  PT/OT evaluation     PCP to follow-up on platelet count      Follow in stroke clinic in 1 month to follow on loop recorder placement, and to monitor for carotid disease.

## 2024-07-09 ENCOUNTER — OFFICE VISIT (OUTPATIENT)
Dept: HEMATOLOGY ONCOLOGY | Facility: CLINIC | Age: 79
End: 2024-07-09
Payer: MEDICARE

## 2024-07-09 VITALS
HEART RATE: 90 BPM | BODY MASS INDEX: 22.68 KG/M2 | WEIGHT: 115.5 LBS | SYSTOLIC BLOOD PRESSURE: 128 MMHG | TEMPERATURE: 97.7 F | HEIGHT: 60 IN | DIASTOLIC BLOOD PRESSURE: 68 MMHG | OXYGEN SATURATION: 92 %

## 2024-07-09 DIAGNOSIS — D50.0 IRON DEFICIENCY ANEMIA DUE TO CHRONIC BLOOD LOSS: Primary | ICD-10-CM

## 2024-07-09 DIAGNOSIS — D75.839 THROMBOCYTOSIS: ICD-10-CM

## 2024-07-09 DIAGNOSIS — D53.9 NUTRITIONAL ANEMIA, UNSPECIFIED: ICD-10-CM

## 2024-07-09 DIAGNOSIS — D50.0 ANEMIA DUE TO GI BLOOD LOSS: ICD-10-CM

## 2024-07-09 PROCEDURE — 99204 OFFICE O/P NEW MOD 45 MIN: CPT | Performed by: PHYSICIAN ASSISTANT

## 2024-07-09 NOTE — PROGRESS NOTES
[address]  [facility] [place]  [address]  [facility] [address]  Hematology Ambulatory Consult  Agatha Villeda, 1945, [id number]  7/9/2024      Assessment and Plan   1. Thrombocytosis  Secondary from previous CVA plus iron deficiency    Resolved.    - Ambulatory Referral to Hematology / Oncology  - CBC and differential; Future  - Iron Panel (Includes Ferritin, Iron Sat%, Iron, and TIBC); Future  - Vitamin B12; Future  - Folate; Future    2. Iron deficiency anemia due to chronic blood loss; 3. Anemia due to GI blood loss;  4. Nutritional anemia, unspecified  Iron deficiency anemia was secondary to the hiatal hernia/lower esophageal irritation from over acid exposure.    Patient is presently on sucarafate to deal with the lower part of the esophagus with follow-up planned for October 2024.  Patient received 400 mg of Venofer over hospitalization.  This is likely responsible for reversal of anemia.  Patient did not have recent iron testing.  We discussed additional dosages.  Patient would prefer to undergo Venofer supplementation 300 mg IV x 4 doses if additional IV iron is necessary.    Patient will be intolerant of oral iron secondary to Sucarafate being used to address the lower esophageal irritation.  This will cause problems with absorption.    Signs and symptoms of upper GI bleeding were reviewed.  Patient understands to contact the office if she experiences melena or hematochezia.  Subsequent CBCs and iron panel will need to be completed as well as GI follow-up to be coordinated.    Patient will follow-up in approximately 3 months with blood work prior.  Patient voiced understanding and agreement.  As did patient's Sister Millie who accompanied her today.  All their questions were answered to their understanding and agreement.    - CBC and differential; Future  - Iron Panel (Includes Ferritin, Iron Sat%, Iron, and TIBC); Future  - Vitamin B12; Future  -  Folate; Future  - Comprehensive metabolic panel; Future  - CBC and differential; Future  - Iron Panel (Includes Ferritin, Iron Sat%, Iron, and TIBC); Future  - Vitamin B12; Future  - Folate; Future    Patient voiced agreement and understanding to the above.   Patient knows to call the Hematology/Oncology office with any questions and concerns regarding the above.    Barrier(s) to care: None.   The patient is able to self care.    Amy Montgomery PA-C  Medical Oncology/Hematology  [facility]    Subjective     Chief Complaint   Patient presents with    Consult       Referring provider    Nai Cox MD  [address] [place] [id number]    History of present illness:   This is a 79-year-old female with past medical history of CVA in May 2024, profound anemia in May 2024 with carotid artery narrowings and bilateral cataracts who presents to hematology for evaluation of thrombocytosis.    Prior to May 2024 patient enjoyed excellent health, did not follow-up with a physician routinely due to lack of medical concerns.  Patient active living at home, has 2 sisters 181 years of age and another 1 slightly younger.  Patient has good social support.     Hospitalization, patient had changes to bowels, melena noted upon review today.    In May 2024 patient presented to the emergency room with right leg weakness that began 3 days prior.  MRI revealed multifocal acute/subacute infarcts involving bilateral frontal parietal and occipital lobes.  On admission patient's hemoglobin was noted to be 6.7 and after hydration dropped to 5.8.  Patient received 2 packed red blood cells along with Venofer 400 mg-total without side effects.   5/10/2024 ferritin 19, iron saturation 2%, TIBC 682    WBC 9.6, hemoglobin 6.7, MCV 70, RDW 17.8, platelet count 779  EDG during admission found large type III hiatal hernia with moderate edematous, scarred, ulcerated mucosa with erosion in the esophagus.  Colonoscopy  was benign.    6/14/2024 WBC 10.7, hemoglobin 11.6, MCV 80, RDW 30.5, platelet count 272      07/09/24: Patient notes no residual issues with CVA.  Patient underwent rehab and is feeling quite well.  Patient does admit to melena in the past however no melena since discharge from hospital.  Patient is tolerating Santillan Carafate without significant toxicity.  Patient has not been taking oral iron outpatient, never advised likely secondary to sick Carafate and upper GI disturbances..    Review of Systems   Constitutional:  Negative for appetite change, fatigue, fever and unexpected weight change.   HENT:  Negative for nosebleeds.    Respiratory:  Negative for cough, choking and shortness of breath.         Negative hemoptysis.   Cardiovascular:  Negative for chest pain, palpitations and leg swelling.   Gastrointestinal: Negative.  Negative for abdominal distention, abdominal pain, anal bleeding, blood in stool, constipation, diarrhea, nausea and vomiting.   Endocrine: Negative.  Negative for cold intolerance.   Genitourinary: Negative.  Negative for hematuria, menstrual problem, vaginal bleeding, vaginal discharge and vaginal pain.   Musculoskeletal: Negative.  Negative for arthralgias, myalgias, neck pain and neck stiffness.   Skin: Negative.  Negative for color change, pallor and rash.   Allergic/Immunologic: Negative.  Negative for immunocompromised state.   Neurological: Negative.  Negative for weakness and headaches.   Hematological:  Negative for adenopathy. Does not bruise/bleed easily.   All other systems reviewed and are negative.      Past Medical History:   Diagnosis Date    Carotid artery narrowings     Cataracts, bilateral     Stroke (HCC)      Past Surgical History:   Procedure Laterality Date    CATARACT EXTRACTION Bilateral     DENTAL SURGERY       Family History   Problem Relation Age of Onset    Cancer Father      Social History     Socioeconomic History    Marital status:      Spouse name: None     Number of children: None    Years of education: None    Highest education level: None   Occupational History    None   Tobacco Use    Smoking status: Never    Smokeless tobacco: Never   Vaping Use    Vaping status: Never Used   Substance and Sexual Activity    Alcohol use: Yes     Comment: rarely    Drug use: Never    Sexual activity: None   Other Topics Concern    None   Social History Narrative    None     Social Determinants of Health     Financial Resource Strain: Not on file   Food Insecurity: No Food Insecurity (6/11/2024)    Hunger Vital Sign     Worried About Running Out of Food in the Last Year: Never true     Ran Out of Food in the Last Year: Never true   Transportation Needs: No Transportation Needs (6/11/2024)    PRAPARE - Transportation     Lack of Transportation (Medical): No     Lack of Transportation (Non-Medical): No   Physical Activity: Not on file   Stress: Not on file   Social Connections: Unknown (6/18/2024)    Received from [employer]    Social [employer]     How often do you feel lonely or isolated from those around you? (Adult - for ages 18 years and over): Not on file   Intimate Partner Violence: Not on file   Housing Stability: Low Risk  (6/11/2024)    Housing Stability Vital Sign     Unable to Pay for Housing in the Last Year: No     Number of Times Moved in the Last Year: 1     Homeless in the Last Year: No         Current Outpatient Medications:     acetaminophen (TYLENOL) 325 mg tablet, Take 2 tablets (650 mg total) by mouth every 4 (four) hours as needed for mild pain, headaches or fever, Disp: 30 tablet, Rfl: 0    amLODIPine (NORVASC) 5 mg tablet, Take 1 tablet (5 mg total) by mouth daily, Disp: 90 tablet, Rfl: 3    aspirin 81 mg chewable tablet, Chew 1 tablet (81 mg total) daily, Disp: 30 tablet, Rfl: 0    atorvastatin (LIPITOR) 40 mg tablet, Take 1 tablet (40 mg total) by mouth every evening, Disp: 90 tablet, Rfl: 1    clopidogrel (PLAVIX) 75 mg tablet, Take 1 tablet (75 mg total)  by mouth daily, Disp: 90 tablet, Rfl: 1    pantoprazole (PROTONIX) 40 mg tablet, Take 1 tablet (40 mg total) by mouth daily in the early morning, Disp: 90 tablet, Rfl: 1    polyethylene glycol (MIRALAX) 17 g packet, Take 17 g by mouth daily as needed (Constipation), Disp: 10 each, Rfl: 0    sucralfate (CARAFATE) 1 g tablet, Take 1 tablet (1 g total) by mouth 4 (four) times a day (before meals and at bedtime), Disp: 120 tablet, Rfl: 0  No Known Allergies    Objective   /68 (BP Location: Right arm, Patient Position: Sitting, Cuff Size: Standard)   Pulse 90   Temp 97.7 °F (36.5 °C) (Temporal)   Ht 5' (1.524 m)   Wt 52.4 kg (115 lb 8 oz)   SpO2 92%   BMI 22.56 kg/m²   Physical Exam  Constitutional:       General: She is not in acute distress.     Appearance: She is well-developed.   HENT:      Head: Normocephalic and atraumatic.   Eyes:      General: No scleral icterus.     Conjunctiva/sclera: Conjunctivae normal.   Cardiovascular:      Rate and Rhythm: Normal rate.   Pulmonary:      Effort: Pulmonary effort is normal. No respiratory distress.   Skin:     General: Skin is warm.      Coloration: Skin is not pale.      Findings: No rash.   Neurological:      Mental Status: She is alert and oriented to person, place, and time.   Psychiatric:         Thought Content: Thought content normal.         Result Review  No visits with results within 3 Week(s) from this visit.   Latest known visit with results is:   Orders Only on 06/14/2024   Component Date Value Ref Range Status    TSH 06/14/2024 1.42  0.45 - 5.33 uIU/mL Final    25-HYDROXY VIT D 06/14/2024 39  30 - 100 ng/mL Final    Comment: Interpretive information: Total Vitamin D, 25-Hydroxy                                              This assay quantifies the sum of vitamin D3,   25-hydroxy and vitamin D2, 25-hydroxy.                                                <10    ng/mL  Deficiency     10-30  ng/mL  Insufficiency      ng/mL  Sufficiency     >100    ng/mL  Toxicity      Glucose, Random 06/14/2024 94  65 - 99 mg/dL Final    BUN 06/14/2024 14  7 - 25 mg/dL Final    Creatinine 06/14/2024 0.76  0.40 - 1.10 mg/dL Final    Sodium 06/14/2024 140  135 - 145 mmol/L Final    Potassium 06/14/2024 4.0  3.5 - 5.2 mmol/L Final    Chloride 06/14/2024 104  100 - 109 mmol/L Final    CO2 06/14/2024 25  21 - 31 mmol/L Final    Calcium 06/14/2024 9.4  8.5 - 10.1 mg/dL Final    Alkaline Phosphatase 06/14/2024 108  35 - 120 U/L Final    Albumin 06/14/2024 4.1  3.5 - 5.7 g/dL Final    TOTAL BILIRUBIN 06/14/2024 0.4  0.2 - 1.0 mg/dL Final    Comment: Eltrombopag and its metabolites may interfere with this assay causing   erroneously high patient results.      Protein, Total 06/14/2024 6.3  6.3 - 8.3 g/dL Final    AST 06/14/2024 44 (H)  <41 U/L Final    ALT 06/14/2024 48  <56 U/L Final    ANION GAP 06/14/2024 11  3 - 11 Final    eGFR 06/14/2024 80  >59 Final    Comment 06/14/2024 (Note)   Final    Comment:  Interpretive information: calculated GFR                                              Units: mL/min per 1.73 square meters   Reported eGFR is based on the CKD-EPI 2021 creatinine equation that   does not use a race coefficient and conforms to the NKF-ASN Task   Force Recommendations.   Note: Calculated GFR may not be an accurate indicator of renal   function if the patient's renal function is not in a steady state.                                              GFR Categories in Chronic Kidney Disease (CKD):   GFR        GFR (mL/min/1.73 square meters)   Category:                   Interpretation:   G1         90 or greater    Normal or high*   G2         60 - 89          Mild decrease*   G3a        45 - 59          Mild to moderate decrease   G3b        30 - 44          Moderate to severe decrease   G4         15 - 29          Severe decrease   G5         14 or less       Kidney failure                                              *In the absence of evidence of kidney damage, neither  G                           FR category G1   or G2 fulfill the criteria for CKD. Kidney Int Suppl 2013, 3: 1-1 50.      HEP C AB 06/14/2024 Nonreactive  NR Final     The specimen was non-reactive. Reflex testing not warranted.    Hemoglobin 06/14/2024 11.6  11.5 - 14.5 g/dL Final    HCT 06/14/2024 35.9  35.0 - 43.0 % Final    White Blood Cell Count 06/14/2024 10.7 (H)  4.0 - 10.0 thou/cmm Final    Red Blood Cell Count 06/14/2024 4.51  3.70 - 4.70 mill/cmm Final    Platelet Count 06/14/2024 272  140 - 350 thou/cmm Final    SL AMB MPV 06/14/2024 8.8  7.5 - 11.3 fL Final    MCV 06/14/2024 80  80 - 100 fL Final    MCH 06/14/2024 25.7 (L)  26.0 - 34.0 pg Final    MCHC 06/14/2024 32.3  32.0 - 37.0 g/dL Final    RDW 06/14/2024 30.5 (H)  12.0 - 16.0 % Final    Differential Type 06/14/2024 AUTO   Final    Neutrophils (Absolute) 06/14/2024 8.5 (H)  1.8 - 7.8 thou/cmm Final    Lymphocytes (Absolute) 06/14/2024 1.0  1.0 - 3.0 thou/cmm Final    Monocytes (Absolute) 06/14/2024 0.6  0.3 - 1.0 thou/cmm Final    Eosinophils (Absolute) 06/14/2024 0.6 (H)  0.0 - 0.5 thou/cmm Final    Basophils ABS 06/14/2024 0.1  0.0 - 0.1 thou/cmm Final    Neutrophils 06/14/2024 79  % Final    Lymphocytes 06/14/2024 9  % Final    Monocytes 06/14/2024 6  % Final    Eosinophils 06/14/2024 5  % Final    Basophils PCT 06/14/2024 1  % Final    Anisocytosis 06/14/2024 Rare   Final    Poikilocytosis 06/14/2024 Rare   Final    Hypochromia 06/14/2024 Rare   Final    Ovalocytes 06/14/2024 Slight   Final    Polychromasia 06/14/2024 Rare   Final    Hematology Comments 06/14/2024     Final     Peripheral smear reviewed by technologist.         Please note:  This report has been generated by a voice recognition software system. Therefore there may be syntax, spelling, and/or grammatical errors. Please call if you have any questions.

## 2024-07-09 NOTE — PATIENT INSTRUCTIONS
[facility] Medical Oncology and Hematology Team  [place] - [phone]    Your Team Member:  Advanced Practitioner:  Amy Montgomery PA-C    Please answer Private and Unavailable Calls - this may be your team(s) contacting you.  If you have medical questions/concerns/issues - contact us either by (1) My Chart (2) [facility]

## 2024-07-12 ENCOUNTER — TELEPHONE (OUTPATIENT)
Age: 79
End: 2024-07-12

## 2024-07-12 LAB
ALBUMIN SERPL-MCNC: 4.3 G/DL (ref 3.5–5.7)
ALP SERPL-CCNC: 137 U/L (ref 35–120)
ALT SERPL-CCNC: 23 U/L
ANION GAP SERPL CALCULATED.3IONS-SCNC: 13 MMOL/L (ref 3–11)
AST SERPL-CCNC: 25 U/L
BILIRUB SERPL-MCNC: 0.4 MG/DL (ref 0.2–1)
BUN SERPL-MCNC: 15 MG/DL (ref 7–25)
CALCIUM SERPL-MCNC: 9.8 MG/DL (ref 8.5–10.1)
CHLORIDE SERPL-SCNC: 102 MMOL/L (ref 100–109)
CO2 SERPL-SCNC: 27 MMOL/L (ref 21–31)
CREAT SERPL-MCNC: 0.9 MG/DL (ref 0.4–1.1)
CYTOLOGY CMNT CVX/VAG CYTO-IMP: ABNORMAL
FOLATE SERPL-MCNC: 8 NG/ML
GFR/BSA.PRED SERPLBLD CYS-BASED-ARV: 65 ML/MIN/{1.73_M2}
GLUCOSE SERPL-MCNC: 105 MG/DL (ref 65–99)
IRON SATN MFR SERPL: 18 % (ref 20–50)
IRON SERPL-MCNC: 67 UG/DL (ref 50–212)
POTASSIUM SERPL-SCNC: 5.1 MMOL/L (ref 3.5–5.2)
PROT SERPL-MCNC: 6.9 G/DL (ref 6.3–8.3)
SODIUM SERPL-SCNC: 142 MMOL/L (ref 135–145)
TIBC SERPL-MCNC: 370 UG/DL (ref 260–430)
TRANSFERRIN SERPL-MCNC: 264 MG/DL (ref 203–362)
VIT B12 SERPL-MCNC: 361 PG/ML (ref 180–914)

## 2024-07-12 NOTE — TELEPHONE ENCOUNTER
Pt was at [facility] and the phlebotomist called asking if lab slips can be faxed to [phone]. Called over to [facility] spoke with Sue, she will send them over to lab since pt is there.

## 2024-07-13 LAB
BASOPHILS # BLD AUTO: 0.1 THOU/CMM (ref 0–0.1)
BASOPHILS NFR BLD AUTO: 1 %
DIFFERENTIAL METHOD BLD: ABNORMAL
EOSINOPHIL # BLD AUTO: 0.4 THOU/CMM (ref 0–0.5)
EOSINOPHIL NFR BLD AUTO: 5 %
ERYTHROCYTE [DISTWIDTH] IN BLOOD BY AUTOMATED COUNT: 27.6 % (ref 12–16)
HCT VFR BLD AUTO: 38.7 % (ref 35–43)
HGB BLD-MCNC: 12.7 G/DL (ref 11.5–14.5)
LYMPHOCYTES # BLD AUTO: 1 THOU/CMM (ref 1–3)
LYMPHOCYTES NFR BLD AUTO: 12 %
MCH RBC QN AUTO: 28.3 PG (ref 26–34)
MCHC RBC AUTO-ENTMCNC: 32.8 G/DL (ref 32–37)
MCV RBC AUTO: 86 FL (ref 80–100)
MICROCYTES BLD QL SMEAR: ABNORMAL
MONOCYTES # BLD AUTO: 0.6 THOU/CMM (ref 0.3–1)
MONOCYTES NFR BLD AUTO: 7 %
MORPHOLOGY BLD-IMP: ABNORMAL
NEUTROPHILS # BLD AUTO: 6.5 THOU/CMM (ref 1.8–7.8)
NEUTROPHILS NFR BLD AUTO: 75 %
OVALOCYTES BLD QL SMEAR: SLIGHT
PLATELET # BLD AUTO: 336 THOU/CMM (ref 140–350)
PMV BLD REES-ECKER: 8.5 FL (ref 7.5–11.3)
RBC # BLD AUTO: 4.48 MILL/CMM (ref 3.7–4.7)
WBC # BLD AUTO: 8.5 THOU/CMM (ref 4–10)

## 2024-07-16 ENCOUNTER — TELEPHONE (OUTPATIENT)
Dept: HEMATOLOGY ONCOLOGY | Facility: CLINIC | Age: 79
End: 2024-07-16

## 2024-07-16 NOTE — TELEPHONE ENCOUNTER
Telephone call left voice message.  Gave Amy's recommendations in note below.  Instructed to call me back with any questions.

## 2024-07-16 NOTE — TELEPHONE ENCOUNTER
----- Message from Amy Montgomery PA-C sent at 7/16/2024  3:42 PM EDT -----  Recommend b 12 gummy vitamin 1000 mcg daily +  mcg of Folic acid   Please notify and tell pt we are still waiting on one iron test.

## 2024-07-19 ENCOUNTER — CONSULT (OUTPATIENT)
Age: 79
End: 2024-07-19
Payer: MEDICARE

## 2024-07-19 VITALS
BODY MASS INDEX: 22.89 KG/M2 | DIASTOLIC BLOOD PRESSURE: 58 MMHG | HEIGHT: 60 IN | WEIGHT: 116.6 LBS | HEART RATE: 81 BPM | SYSTOLIC BLOOD PRESSURE: 160 MMHG | OXYGEN SATURATION: 97 % | RESPIRATION RATE: 20 BRPM

## 2024-07-19 DIAGNOSIS — I63.9 STROKE (HCC): ICD-10-CM

## 2024-07-19 DIAGNOSIS — I65.23 CAROTID STENOSIS, ASYMPTOMATIC, BILATERAL: ICD-10-CM

## 2024-07-19 DIAGNOSIS — I63.9 STROKE (HCC): Primary | ICD-10-CM

## 2024-07-19 DIAGNOSIS — I63.9 ACUTE CVA (CEREBROVASCULAR ACCIDENT) (HCC): ICD-10-CM

## 2024-07-19 DIAGNOSIS — D75.839 THROMBOCYTOSIS: ICD-10-CM

## 2024-07-19 PROBLEM — I80.8 SUPERFICIAL THROMBOPHLEBITIS OF RIGHT UPPER EXTREMITY: Status: ACTIVE | Noted: 2024-07-19

## 2024-07-19 PROCEDURE — 99204 OFFICE O/P NEW MOD 45 MIN: CPT | Performed by: SURGERY

## 2024-07-19 RX ORDER — SUCRALFATE 1 G/1
1 TABLET ORAL
Qty: 90 TABLET | Refills: 7 | Status: SHIPPED | OUTPATIENT
Start: 2024-07-19

## 2024-07-19 RX ORDER — FOLIC ACID 0.8 MG
800 TABLET ORAL DAILY
COMMUNITY

## 2024-07-19 RX ORDER — LANOLIN ALCOHOL/MO/W.PET/CERES
CREAM (GRAM) TOPICAL DAILY
COMMUNITY

## 2024-07-19 NOTE — ASSESSMENT & PLAN NOTE
"Recent bilateral strokes back in May.  Patient reports she was on a cruise [place] when she began to experience left hand and right leg weakness.  Per neurology note - follow up in stroke clinic in \"1month\" to follow up on \"loop recorder.\"  Patient reports she was not told to follow up with cardiology?    In any event CTA personally reviewed- she does not have significant carotid disease to warrant carotid intervention  Agree with ASA. Plavix, and statin therapy.  Will get baseline carotid duplex with recommend on going surveillance moving forward.  "

## 2024-07-19 NOTE — PROGRESS NOTES
"Ambulatory Visit  Name: Agatha Villeda      : 1945      MRN: [id number]  Encounter Provider: Ashleigh Williamson DO  Encounter Date: 2024   Encounter department: [facility] [place]    Assessment & Plan   1. Stroke (HCC)  -     Ambulatory Referral to Vascular Surgery  -     Ambulatory Referral to Cardiology; Future  2. Acute CVA (cerebrovascular accident) (HCC)  Assessment & Plan:  Recent bilateral strokes back in May.  Patient reports she was on a cruise [place] when she began to experience left hand and right leg weakness.  Per neurology note - follow up in stroke clinic in \"1month\" to follow up on \"loop recorder.\"  Patient reports she was not told to follow up with cardiology?    In any event CTA personally reviewed- she does not have significant carotid disease to warrant carotid intervention  Agree with ASA. Plavix, and statin therapy.  Will get baseline carotid duplex with recommend on going surveillance moving forward.  3. Thrombocytosis  4. Carotid stenosis, asymptomatic, bilateral  -     VAS carotid complete study; Future; Expected date: 2024      History of Present Illness     Agatha Villeda is a 79 y.o. female who presents to office in follow up from recent hospitalization where she was worked up and diagnosed with bilateral strokes. She reports she is nearly back to baseline after working with rehab    Review of Systems  Past Medical History   Past Medical History:   Diagnosis Date    Carotid artery narrowings     Cataracts, bilateral     Stroke (HCC)      Past Surgical History:   Procedure Laterality Date    CATARACT EXTRACTION Bilateral     DENTAL SURGERY       Family History   Problem Relation Age of Onset    Cancer Father      Current Outpatient Medications on File Prior to Visit   Medication Sig Dispense Refill    acetaminophen (TYLENOL) 325 mg tablet Take 2 tablets (650 mg total) by mouth every 4 (four) hours as needed for mild pain, " headaches or fever 30 tablet 0    amLODIPine (NORVASC) 5 mg tablet Take 1 tablet (5 mg total) by mouth daily 90 tablet 3    aspirin 81 mg chewable tablet Chew 1 tablet (81 mg total) daily 30 tablet 0    atorvastatin (LIPITOR) 40 mg tablet Take 1 tablet (40 mg total) by mouth every evening 90 tablet 1    clopidogrel (PLAVIX) 75 mg tablet Take 1 tablet (75 mg total) by mouth daily 90 tablet 1    folic acid (FOLVITE) 800 MCG tablet Take 800 mcg by mouth daily      pantoprazole (PROTONIX) 40 mg tablet Take 1 tablet (40 mg total) by mouth daily in the early morning 90 tablet 1    polyethylene glycol (MIRALAX) 17 g packet Take 17 g by mouth daily as needed (Constipation) 10 each 0    sucralfate (CARAFATE) 1 g tablet Take 1 tablet (1 g total) by mouth 4 (four) times a day (before meals and at bedtime) 120 tablet 0    vitamin B-12 (VITAMIN B-12) 1,000 mcg tablet Take by mouth daily       No current facility-administered medications on file prior to visit.   No Known Allergies   Current Outpatient Medications on File Prior to Visit   Medication Sig Dispense Refill    acetaminophen (TYLENOL) 325 mg tablet Take 2 tablets (650 mg total) by mouth every 4 (four) hours as needed for mild pain, headaches or fever 30 tablet 0    amLODIPine (NORVASC) 5 mg tablet Take 1 tablet (5 mg total) by mouth daily 90 tablet 3    aspirin 81 mg chewable tablet Chew 1 tablet (81 mg total) daily 30 tablet 0    atorvastatin (LIPITOR) 40 mg tablet Take 1 tablet (40 mg total) by mouth every evening 90 tablet 1    clopidogrel (PLAVIX) 75 mg tablet Take 1 tablet (75 mg total) by mouth daily 90 tablet 1    folic acid (FOLVITE) 800 MCG tablet Take 800 mcg by mouth daily      pantoprazole (PROTONIX) 40 mg tablet Take 1 tablet (40 mg total) by mouth daily in the early morning 90 tablet 1    polyethylene glycol (MIRALAX) 17 g packet Take 17 g by mouth daily as needed (Constipation) 10 each 0    sucralfate (CARAFATE) 1 g tablet Take 1 tablet (1 g total) by  mouth 4 (four) times a day (before meals and at bedtime) 120 tablet 0    vitamin B-12 (VITAMIN B-12) 1,000 mcg tablet Take by mouth daily       No current facility-administered medications on file prior to visit.      Social History     Tobacco Use    Smoking status: Never    Smokeless tobacco: Never   Vaping Use    Vaping status: Never Used   Substance and Sexual Activity    Alcohol use: Yes     Comment: rarely    Drug use: Never    Sexual activity: Not on file     Objective     /58 (BP Location: Left arm, Patient Position: Sitting, Cuff Size: Standard)   Pulse 81   Resp 20   Ht 5' (1.524 m)   Wt 52.9 kg (116 lb 9.6 oz)   SpO2 97%   BMI 22.77 kg/m²     Physical Exam  Constitutional:       General: She is not in acute distress.     Appearance: She is well-developed.   HENT:      Head: Normocephalic and atraumatic.   Eyes:      General: No scleral icterus.     Conjunctiva/sclera: Conjunctivae normal.   Neck:      Trachea: No tracheal deviation.   Cardiovascular:      Rate and Rhythm: Normal rate and regular rhythm.      Heart sounds: Normal heart sounds.   Pulmonary:      Effort: Pulmonary effort is normal.      Breath sounds: Normal breath sounds.   Abdominal:      General: There is no distension.      Palpations: Abdomen is soft. There is no mass (no appreciable aortic pulsation/aneurysm).      Tenderness: There is no abdominal tenderness. There is no guarding or rebound.   Musculoskeletal:         General: Normal range of motion.      Cervical back: Normal range of motion and neck supple.   Skin:     General: Skin is warm and dry.   Neurological:      Mental Status: She is alert and oriented to person, place, and time.      Motor: Weakness (slightly weaker  strenght on left when compared to right  strength) present.   Psychiatric:         Mood and Affect: Mood normal.         Behavior: Behavior normal.         Thought Content: Thought content normal.         Judgment: Judgment normal.        Administrative Statements   I have spent a total time of 30 minutes in caring for this patient on the day of the visit/encounter including Diagnostic results, Prognosis, Risks and benefits of tx options, Instructions for management, Patient and family education, Importance of tx compliance, Risk factor reductions, Impressions, Counseling / Coordination of care, Documenting in the medical record, Reviewing / ordering tests, medicine, procedures  , and Obtaining or reviewing history  .

## 2024-07-19 NOTE — TELEPHONE ENCOUNTER
Reason for call:   [x] Refill   [] Prior Auth  [] Other:     Office:   [x] PCP/Provider - Nasrin Garcia MD  [] Specialty/Provider -     Medication:   sucralfate (CARAFATE) 1 g tablet       Dose/Frequency: 1 g, Oral, 4 times daily (before meals and at bedtime)     Quantity: 120    Pharmacy: [id number] [address]     Does the patient have enough for 3 days?   [x] Yes   [] No - Send as HP to POD

## 2024-07-23 ENCOUNTER — TELEPHONE (OUTPATIENT)
Dept: HEMATOLOGY ONCOLOGY | Facility: CLINIC | Age: 79
End: 2024-07-23

## 2024-07-23 DIAGNOSIS — D75.839 THROMBOCYTOSIS: Primary | ICD-10-CM

## 2024-07-23 DIAGNOSIS — D50.0 ANEMIA DUE TO GI BLOOD LOSS: ICD-10-CM

## 2024-07-23 DIAGNOSIS — D53.9 NUTRITIONAL ANEMIA, UNSPECIFIED: ICD-10-CM

## 2024-07-23 DIAGNOSIS — D50.0 IRON DEFICIENCY ANEMIA DUE TO CHRONIC BLOOD LOSS: ICD-10-CM

## 2024-07-23 NOTE — TELEPHONE ENCOUNTER
[facility] lab could not add ferritin. Patient agreed to return to lab tomorrow 7/24/24 to have this collected.    Lab test re-ordered. Lab slip faxed to the [facility] in [place] per patient request.    [facility] Medicine--[facility] at [address] [address]  Tel [phone]  Fax [phone]

## 2024-07-24 LAB — FERRITIN SERPL-MCNC: 71 NG/ML (ref 11–306.8)

## 2024-07-26 ENCOUNTER — HOSPITAL ENCOUNTER (OUTPATIENT)
Dept: NON INVASIVE DIAGNOSTICS | Facility: HOSPITAL | Age: 79
Discharge: HOME | End: 2024-07-26
Attending: SURGERY
Payer: MEDICARE

## 2024-07-26 DIAGNOSIS — I65.23 CAROTID STENOSIS, ASYMPTOMATIC, BILATERAL: ICD-10-CM

## 2024-07-26 PROCEDURE — 93880 EXTRACRANIAL BILAT STUDY: CPT | Performed by: SURGERY

## 2024-07-26 PROCEDURE — 93880 EXTRACRANIAL BILAT STUDY: CPT

## 2024-08-01 NOTE — ASSESSMENT & PLAN NOTE
"Presented with R leg weakness that began 3 days ago that has not improved  Possibly 2/2 to acute stroke  Ct scan revealed  \"Hypodensities within the left parietal lobe and left cerebellar hemisphere may represent age-indeterminate infarct\"  MRI revealed \" Multifocal acute/subacute infarcts involving bilateral frontoparietal and occipital lobes as well as left cerebellum suggesting embolic etiology\"  Appreciate neurology consultation  Will continue with aspirin Plavix, statin  Will need event monitor outpatient will provide cardiology referral  PT/OT  recommending home therapy  " Patient scheduled to see Dr. Tobin on 9/16/2024.  Marian REES, asks if the patient is still safe to have her wrist surgery later this month given the blockage in her carotid artery.  She would like a call back to let them know if they need to postpone that surgery.  [phone]

## 2024-08-09 ENCOUNTER — OFFICE VISIT (OUTPATIENT)
Dept: CARDIOLOGY CLINIC | Facility: CLINIC | Age: 79
End: 2024-08-09
Payer: MEDICARE

## 2024-08-09 VITALS
HEART RATE: 86 BPM | HEIGHT: 60 IN | BODY MASS INDEX: 22.78 KG/M2 | OXYGEN SATURATION: 96 % | WEIGHT: 116 LBS | SYSTOLIC BLOOD PRESSURE: 142 MMHG | DIASTOLIC BLOOD PRESSURE: 60 MMHG

## 2024-08-09 DIAGNOSIS — I63.9 ACUTE CVA (CEREBROVASCULAR ACCIDENT) (HCC): Primary | ICD-10-CM

## 2024-08-09 DIAGNOSIS — I65.23 CAROTID STENOSIS, ASYMPTOMATIC, BILATERAL: ICD-10-CM

## 2024-08-09 PROCEDURE — 99204 OFFICE O/P NEW MOD 45 MIN: CPT | Performed by: INTERNAL MEDICINE

## 2024-08-09 NOTE — ASSESSMENT & PLAN NOTE
She has already seen vascular surgery and her recent carotid duplex showed less than 50% bilateral carotid stenosis and it will be followed annually by vascular surgery.  Her lipid profile is excellent and she is on low-dose statins now.

## 2024-08-09 NOTE — ASSESSMENT & PLAN NOTE
The pattern of her stroke on her MRI was highly suggestive of cardioembolic etiology.  Patient reports no symptoms of atrial fibrillation.  Echocardiogram showed normal left ventricle systolic function with mild age-related valvular changes but no evidence of intracardiac shunting.  We will proceed with a 15-day ZIO monitor and if no atrial fibrillation is found, we will schedule her for an implantable loop recorder for long-term monitoring.  In the meanwhile, she will continue the dual antiplatelet until she sees neurology.  She was placed on low-dose amlodipine during her recent hospitalization.  Blood pressure is fair on current dose.

## 2024-08-09 NOTE — PROGRESS NOTES
[facility] [place]  [address]  [address]  [facility] [address] [phone]      Patient name: Agatha Villeda   YOB: 1945   MR no: [id number]      Diagnosis ICD-10-CM Associated Orders   1. Acute CVA (cerebrovascular accident) ([facility])  I63.9 Ambulatory Referral to Cardiology     AMB extended holter monitor      2. Carotid stenosis, asymptomatic, bilateral  I65.23         ASSESSMENT AND RECOMMENDATIONS:  1. Acute CVA (cerebrovascular accident) ([facility])  Assessment & Plan:  The pattern of her stroke on her MRI was highly suggestive of cardioembolic etiology.  Patient reports no symptoms of atrial fibrillation.  Echocardiogram showed normal left ventricle systolic function with mild age-related valvular changes but no evidence of intracardiac shunting.  We will proceed with a 15-day ZIO monitor and if no atrial fibrillation is found, we will schedule her for an implantable loop recorder for long-term monitoring.  In the meanwhile, she will continue the dual antiplatelet until she sees neurology.  She was placed on low-dose amlodipine during her recent hospitalization.  Blood pressure is fair on current dose.  Orders:  -     Ambulatory Referral to Cardiology  -     AMB extended holter monitor; Future; Expected date: 08/09/2024  2. Carotid stenosis, asymptomatic, bilateral  Assessment & Plan:  She has already seen vascular surgery and her recent carotid duplex showed less than 50% bilateral carotid stenosis and it will be followed annually by vascular surgery.  Her lipid profile is excellent and she is on low-dose statins now.       CHIEF COMPLAINT:  Cardiac evaluation poststroke    HPI:  79-year-old female who is in excellent health after May when she was on a cruise and suddenly developed left hand and right leg weakness.  She subsequently presented to the emergency room in [place] and was admitted with stroke.  MRI brain reported multifocal  acute and subacute infarcts involving the left greater then right frontoparietal cortices and left cerebellum.  CTA neck showed 50% left carotid disease.  Patient reports that she has completely recovered from her stroke.  She lives independently and takes care of all of her home chores.  She denies any exertional chest pain, dyspnea on exertion, palpitations or syncope.      Past Medical History:   Diagnosis Date    Carotid artery narrowings     Cataracts, bilateral     Stroke (HCC)         Past Surgical History:   Procedure Laterality Date    CATARACT EXTRACTION Bilateral     DENTAL SURGERY          Social History     Tobacco Use    Smoking status: Never    Smokeless tobacco: Never   Vaping Use    Vaping status: Never Used   Substance Use Topics    Alcohol use: Not Currently     Comment: rarely    Drug use: Never       Family History   Problem Relation Age of Onset    Cancer Father         No Known Allergies      Current Outpatient Medications:     acetaminophen (TYLENOL) 325 mg tablet, Take 2 tablets (650 mg total) by mouth every 4 (four) hours as needed for mild pain, headaches or fever, Disp: 30 tablet, Rfl: 0    amLODIPine (NORVASC) 5 mg tablet, Take 1 tablet (5 mg total) by mouth daily, Disp: 90 tablet, Rfl: 3    aspirin 81 mg chewable tablet, Chew 1 tablet (81 mg total) daily, Disp: 30 tablet, Rfl: 0    atorvastatin (LIPITOR) 40 mg tablet, Take 1 tablet (40 mg total) by mouth every evening, Disp: 90 tablet, Rfl: 1    clopidogrel (PLAVIX) 75 mg tablet, Take 1 tablet (75 mg total) by mouth daily, Disp: 90 tablet, Rfl: 1    folic acid (FOLVITE) 800 MCG tablet, Take 800 mcg by mouth daily, Disp: , Rfl:     pantoprazole (PROTONIX) 40 mg tablet, Take 1 tablet (40 mg total) by mouth daily in the early morning, Disp: 90 tablet, Rfl: 1    polyethylene glycol (MIRALAX) 17 g packet, Take 17 g by mouth daily as needed (Constipation), Disp: 10 each, Rfl: 0    sucralfate (CARAFATE) 1 g tablet, Take 1 tablet (1 g total) by  mouth 4 (four) times a day (before meals and at bedtime), Disp: 90 tablet, Rfl: 7    vitamin B-12 (VITAMIN B-12) 1,000 mcg tablet, Take by mouth daily, Disp: , Rfl:      Lab Results   Component Value Date    CREATININE 0.90 07/12/2024    CREATININE 0.76 06/14/2024    CREATININE 0.70 05/17/2024    K 5.1 07/12/2024    K 4.0 06/14/2024    K 3.7 05/17/2024    SODIUM 142 07/12/2024    SODIUM 140 06/14/2024    SODIUM 137 05/17/2024    LDLCALC 88 05/11/2024    TRIG 87 05/11/2024    HDL 49 (L) 05/11/2024    CHOLESTEROL 154 05/11/2024    TSH 1.42 06/14/2024       I have personally reviewed the ECG from May 10, 2024 which showed normal sinus rhythm with no other abnormalities.    REVIEW OF SYSTEMS   Positive for: As per HPI  Negative for: All remaining as reviewed below and in HPI.    SYSTEM SYMPTOMS REVIEWED:  General--weight change, fever, night sweats  Respiratory--cough, wheezing, shortness of breath, sputum production  Cardiovascular--chest pain, syncope, dyspnea on exertion, edema, decline in exercise tolerance, claudication   Gastrointestinal--persistent vomiting, diarrhea, abdominal distention, blood in stool   Muscular or skeletal--joint pain or swelling   Neurologic--headaches, syncope, abnormal movement  Hematologic--history of easy bruising and bleeding   Endocrine--thyroid enlargement, heat or cold intolerance, polyuria   Psychiatric--anxiety, depression     Vitals:    08/09/24 1041   BP: 142/60   Pulse: 86   SpO2: 96%   Weight: 52.6 kg (116 lb)   Height: 5' (1.524 m)       Wt Readings from Last 3 Encounters:   08/09/24 52.6 kg (116 lb)   07/19/24 52.9 kg (116 lb 9.6 oz)   07/09/24 52.4 kg (115 lb 8 oz)        Body mass index is 22.65 kg/m².     General physical examination:    General appearance: Alert, no acute distress, appears stated age, normal weight  HEENT: Mucous membranes are moist.  No obvious abnormality noted.  Neck: Supple with no lymphadenopathy.  No JVD.  Carotid pulses are intact.  Bilateral mild  "carotid bruits.    Cardiovascular system: Regular rhythm.  Normal S1 and S2.  No murmurs.  No rubs or gallops. Extremities: No edema. No cyanosis.  Pulmonary: Respirations unlabored.  Good air entry bilaterally.  Clear to auscultation bilaterally.  Gastrointestinal: Abdomen is soft and nontender.  Bowel sounds are positive.  Musculoskeletal: Upper Extremities: Normal upper motor strength. Lower Extremity: Normal motor strength. Gait: Normal.   Skin: Skin is warm. No rashes or lesions.  Neurological: Patient is alert and oriented with no gross motor deficits.  Psychiatric: Mood is normal.  Behavior is normal.        Thank you for allowing me to be a part of this patient's care. If you have further questions, please feel free to contact me.    Jose Raul Rodriguez MD, FACC, JAVIER    Portions of the record  have been created with voice recognition software.  Occasional grammatical mistakes or wrong word or \"sound alike\" substitutions may have occurred due to the inherent limitations of voice recognition software. Please reach out to me directly for any clarifications.     "

## 2024-08-27 ENCOUNTER — HOSPITAL ENCOUNTER (OUTPATIENT)
Dept: BONE DENSITY | Facility: MEDICAL CENTER | Age: 79
Discharge: HOME | End: 2024-08-27
Payer: MEDICARE

## 2024-08-27 DIAGNOSIS — Z78.0 ASYMPTOMATIC POSTMENOPAUSAL STATE: ICD-10-CM

## 2024-08-27 PROCEDURE — 77080 DXA BONE DENSITY AXIAL: CPT

## 2024-09-03 ENCOUNTER — OFFICE VISIT (OUTPATIENT)
Dept: FAMILY MEDICINE CLINIC | Facility: CLINIC | Age: 79
End: 2024-09-03
Payer: MEDICARE

## 2024-09-03 VITALS
HEIGHT: 60 IN | HEART RATE: 84 BPM | WEIGHT: 117.8 LBS | TEMPERATURE: 97.4 F | DIASTOLIC BLOOD PRESSURE: 62 MMHG | OXYGEN SATURATION: 97 % | BODY MASS INDEX: 23.13 KG/M2 | SYSTOLIC BLOOD PRESSURE: 128 MMHG

## 2024-09-03 DIAGNOSIS — I65.23 CAROTID STENOSIS, ASYMPTOMATIC, BILATERAL: ICD-10-CM

## 2024-09-03 DIAGNOSIS — D64.9 ACUTE ANEMIA: ICD-10-CM

## 2024-09-03 DIAGNOSIS — I63.9 STROKE (HCC): Primary | ICD-10-CM

## 2024-09-03 PROCEDURE — 99214 OFFICE O/P EST MOD 30 MIN: CPT | Performed by: FAMILY MEDICINE

## 2024-09-03 PROCEDURE — G2211 COMPLEX E/M VISIT ADD ON: HCPCS | Performed by: FAMILY MEDICINE

## 2024-09-03 RX ORDER — SUCRALFATE 1 G/1
1 TABLET ORAL
Qty: 360 TABLET | Refills: 2 | Status: SHIPPED | OUTPATIENT
Start: 2024-09-03 | End: 2024-12-02

## 2024-09-03 NOTE — PROGRESS NOTES
Subjective:    ARLINE Snider is a 79 y.o. female here today for:  Chief Complaint   Patient presents with    Follow-up   .      ---Above per clinical staff & reviewed. ---  HPI:  79yof here for follow up  Doing well  Seen by vascular, cardiology and hematology  Anemia has reversed with dose  Taking B12 for low B12  Just did loop recorder but has not heard back yet  Has appt with neurology coming up  DEXA scan recently done, no results yet  Denies CP, occasional shortness of breath with activity   Needs refill of Sucralfate    The following portions of the patient's history were reviewed and updated as appropriate: allergies, current medications, past family history, past medical history, past social history, past surgical history and problem list.    Past Medical History:   Diagnosis Date    Carotid artery narrowings     Cataracts, bilateral     Stroke (HCC)        Past Surgical History:   Procedure Laterality Date    CATARACT EXTRACTION Bilateral     DENTAL SURGERY         Social History     Socioeconomic History    Marital status:      Spouse name: None    Number of children: None    Years of education: None    Highest education level: None   Occupational History    None   Tobacco Use    Smoking status: Never    Smokeless tobacco: Never   Vaping Use    Vaping status: Never Used   Substance and Sexual Activity    Alcohol use: Not Currently     Comment: rarely    Drug use: Never    Sexual activity: None   Other Topics Concern    None   Social History Narrative    None     Social Determinants of Health     Financial Resource Strain: Not on file   Food Insecurity: No Food Insecurity (6/11/2024)    Hunger Vital Sign     Worried About Running Out of Food in the Last Year: Never true     Ran Out of Food in the Last Year: Never true   Transportation Needs: No Transportation Needs (6/11/2024)    PRAPARE - Transportation     Lack of Transportation (Medical): No     Lack of Transportation (Non-Medical): No   Physical  Activity: Not on file   Stress: Not on file   Social Connections: Unknown (6/18/2024)    Received from [facility]     How often do you feel lonely or isolated from those around you? (Adult - for ages 18 years and over): Not on file   Intimate Partner Violence: Not on file   Housing Stability: Low Risk  (6/11/2024)    Housing Stability Vital Sign     Unable to Pay for Housing in the Last Year: No     Number of Times Moved in the Last Year: 1     Homeless in the Last Year: No       Current Outpatient Medications   Medication Sig Dispense Refill    acetaminophen (TYLENOL) 325 mg tablet Take 2 tablets (650 mg total) by mouth every 4 (four) hours as needed for mild pain, headaches or fever 30 tablet 0    amLODIPine (NORVASC) 5 mg tablet Take 1 tablet (5 mg total) by mouth daily 90 tablet 3    aspirin 81 mg chewable tablet Chew 1 tablet (81 mg total) daily 30 tablet 0    atorvastatin (LIPITOR) 40 mg tablet Take 1 tablet (40 mg total) by mouth every evening 90 tablet 1    clopidogrel (PLAVIX) 75 mg tablet Take 1 tablet (75 mg total) by mouth daily 90 tablet 1    folic acid (FOLVITE) 800 MCG tablet Take 800 mcg by mouth daily      pantoprazole (PROTONIX) 40 mg tablet Take 1 tablet (40 mg total) by mouth daily in the early morning 90 tablet 1    polyethylene glycol (MIRALAX) 17 g packet Take 17 g by mouth daily as needed (Constipation) 10 each 0    sucralfate (CARAFATE) 1 g tablet Take 1 tablet (1 g total) by mouth 4 (four) times a day (before meals and at bedtime) 360 tablet 2    vitamin B-12 (VITAMIN B-12) 1,000 mcg tablet Take by mouth daily       No current facility-administered medications for this visit.        Review of Systems   Constitutional: Negative.  Negative for chills and fever.   HENT: Negative.  Negative for ear pain and sore throat.    Eyes:  Negative for pain and visual disturbance.   Respiratory:  Positive for shortness of breath. Negative for cough.    Cardiovascular: Negative.   Negative for chest pain and palpitations.   Gastrointestinal: Negative.  Negative for abdominal pain and vomiting.   Genitourinary: Negative.  Negative for dysuria and hematuria.   Musculoskeletal:  Negative for arthralgias and back pain.   Skin:  Negative for color change and rash.   Neurological: Negative.  Negative for seizures and syncope.   Psychiatric/Behavioral: Negative.     All other systems reviewed and are negative.       Objective:    /62 (BP Location: Left arm, Patient Position: Sitting, Cuff Size: Adult)   Pulse 84   Temp (!) 97.4 °F (36.3 °C) (Temporal)   Ht 5' (1.524 m)   Wt 53.4 kg (117 lb 12.8 oz)   SpO2 97%   BMI 23.01 kg/m²   Wt Readings from Last 3 Encounters:   09/03/24 53.4 kg (117 lb 12.8 oz)   08/09/24 52.6 kg (116 lb)   07/19/24 52.9 kg (116 lb 9.6 oz)     BP Readings from Last 3 Encounters:   09/03/24 128/62   08/09/24 142/60   07/19/24 160/58       Lab Results   Component Value Date    WBC 8.5 07/12/2024    HGB 12.7 07/12/2024    HCT 38.7 07/12/2024     07/12/2024    TRIG 87 05/11/2024    HDL 49 (L) 05/11/2024    ALT 23 07/12/2024    AST 25 07/12/2024    K 5.1 07/12/2024     07/12/2024    CREATININE 0.90 07/12/2024    BUN 15 07/12/2024    CO2 27 07/12/2024    TSH 1.42 06/14/2024    HGBA1C 5.5 05/11/2024       Physical Exam  Vitals and nursing note reviewed.   Constitutional:       Appearance: Normal appearance. She is well-developed.   HENT:      Head: Normocephalic and atraumatic.   Eyes:      Pupils: Pupils are equal, round, and reactive to light.   Cardiovascular:      Rate and Rhythm: Normal rate and regular rhythm.      Pulses: Normal pulses.      Heart sounds: Murmur heard.   Pulmonary:      Effort: Pulmonary effort is normal.      Breath sounds: Normal breath sounds.   Musculoskeletal:      Cervical back: Normal range of motion and neck supple.   Skin:     General: Skin is warm.      Capillary Refill: Capillary refill takes less than 2 seconds.    Neurological:      General: No focal deficit present.      Mental Status: She is alert and oriented to person, place, and time.      Cranial Nerves: No cranial nerve deficit.   Psychiatric:         Mood and Affect: Mood normal.         Behavior: Behavior normal.         Thought Content: Thought content normal.                       Assessment/Plan:   Agatha was seen today for follow-up.    Diagnoses and all orders for this visit:    Stroke (HCC)  -     sucralfate (CARAFATE) 1 g tablet; Take 1 tablet (1 g total) by mouth 4 (four) times a day (before meals and at bedtime)    Carotid stenosis, asymptomatic, bilateral    Acute anemia      Return in about 3 months (around 12/3/2024) for Recheck.  There are no Patient Instructions on file for this visit.

## 2024-09-04 ENCOUNTER — CLINICAL SUPPORT (OUTPATIENT)
Dept: CARDIOLOGY CLINIC | Facility: CLINIC | Age: 79
End: 2024-09-04
Payer: MEDICARE

## 2024-09-04 DIAGNOSIS — I63.9 ACUTE CVA (CEREBROVASCULAR ACCIDENT) (HCC): ICD-10-CM

## 2024-09-04 PROCEDURE — 93248 EXT ECG>7D<15D REV&INTERPJ: CPT

## 2024-09-05 ENCOUNTER — TELEPHONE (OUTPATIENT)
Dept: CARDIOLOGY CLINIC | Facility: CLINIC | Age: 79
End: 2024-09-05

## 2024-09-05 NOTE — TELEPHONE ENCOUNTER
----- Message from Jose Raul Rodriguez MD sent at 9/4/2024 11:12 AM EDT -----  Regarding: Implantable loop recorder  Hi.  This patient had a stroke highly suggestive of embolic etiology.  Her 14-day ZIO monitor did not show any evidence of atrial fibrillation.  I would like her to be scheduled for an implantable loop recorder.

## 2024-09-06 ENCOUNTER — PREP FOR PROCEDURE (OUTPATIENT)
Dept: CARDIOLOGY CLINIC | Facility: CLINIC | Age: 79
End: 2024-09-06

## 2024-09-06 DIAGNOSIS — I63.9 ACUTE CVA (CEREBROVASCULAR ACCIDENT) (HCC): Primary | ICD-10-CM

## 2024-09-06 NOTE — TELEPHONE ENCOUNTER
"Patient is scheduled for LOOP Recorder Implant on 9/11/24 at [facility] with .     Patient aware of all general instructions.    Instructions sent to patient through [employer].      Medication holds:   N/A    Blood Thinners:   N/A    Blood work to be done on:  N/A  (Patient did CMP / CBC on 7/12/24)      Thank you,  Janell \"Bhakti\" Shahab      "

## 2024-09-11 ENCOUNTER — HOSPITAL ENCOUNTER (OUTPATIENT)
Facility: HOSPITAL | Age: 79
Setting detail: OUTPATIENT SURGERY
Discharge: HOME | End: 2024-09-11
Attending: STUDENT IN AN ORGANIZED HEALTH CARE EDUCATION/TRAINING PROGRAM | Admitting: STUDENT IN AN ORGANIZED HEALTH CARE EDUCATION/TRAINING PROGRAM
Payer: MEDICARE

## 2024-09-11 VITALS
BODY MASS INDEX: 22.78 KG/M2 | SYSTOLIC BLOOD PRESSURE: 151 MMHG | TEMPERATURE: 97.8 F | HEART RATE: 88 BPM | OXYGEN SATURATION: 97 % | HEIGHT: 60 IN | WEIGHT: 116 LBS | RESPIRATION RATE: 18 BRPM | DIASTOLIC BLOOD PRESSURE: 66 MMHG

## 2024-09-11 DIAGNOSIS — I63.9 ACUTE CVA (CEREBROVASCULAR ACCIDENT) (HCC): ICD-10-CM

## 2024-09-11 PROCEDURE — 33285 INSJ SUBQ CAR RHYTHM MNTR: CPT | Performed by: STUDENT IN AN ORGANIZED HEALTH CARE EDUCATION/TRAINING PROGRAM

## 2024-09-11 PROCEDURE — 33285 INSJ SUBQ CAR RHYTHM MNTR: CPT | Performed by: PHYSICIAN ASSISTANT

## 2024-09-11 PROCEDURE — NC001 PR NO CHARGE: Performed by: PHYSICIAN ASSISTANT

## 2024-09-11 PROCEDURE — C1764 EVENT RECORDER, CARDIAC: HCPCS | Performed by: STUDENT IN AN ORGANIZED HEALTH CARE EDUCATION/TRAINING PROGRAM

## 2024-09-11 DEVICE — LOOP RECORDER REVEAL LINQ II SYS DEVICE ONLY: Type: IMPLANTABLE DEVICE | Status: FUNCTIONAL

## 2024-09-11 RX ORDER — LIDOCAINE HYDROCHLORIDE 10 MG/ML
INJECTION, SOLUTION EPIDURAL; INFILTRATION; INTRACAUDAL; PERINEURAL
Status: DISCONTINUED
Start: 2024-09-11 | End: 2024-09-11 | Stop reason: HOSPADM

## 2024-09-11 RX ORDER — LIDOCAINE HYDROCHLORIDE 10 MG/ML
INJECTION, SOLUTION EPIDURAL; INFILTRATION; INTRACAUDAL; PERINEURAL CODE/TRAUMA/SEDATION MEDICATION
Status: DISCONTINUED | OUTPATIENT
Start: 2024-09-11 | End: 2024-09-11 | Stop reason: HOSPADM

## 2024-09-11 NOTE — H&P
Patient with hx of stroke, ZIO monitor without arrythmia, primary provider requesting long term PAF monitoring, plan for loop recorder implant today.

## 2024-09-11 NOTE — DISCHARGE INSTR - AVS FIRST PAGE
Post Procedure Care instructions:     Pain management   Tylenol (acetaminophen) and ice        First 7 days:  Bandage must remain on wound for first 48 hours then you may take it off  Do not use lotions, powders, creams, or ointments on incision  Bathing:    Place waterproof bandage over top when showering   Avoid water directly hitting bandage   Do not soak incision   After 7 days:  If glue is present:  Avoid picking at the glue or peeling it off, the glue will come off on its own  If stiches present:   Leave in place, they will be removed at your 2 week follow up appointment      When to call clinic:    Redness or swelling at incision site   Opening and/or drainage from the incision   Temperature >100.4 F     If you have any questions:   [phone] 8:30am-5:30pm  [phone] After hours  [phone] Appointments     Information about your device:     WHAT YOU SHOULD KNOW:    A cardiac loop recorder is a device used to diagnose heart rhythm problems, such as a fast or irregular heartbeat. It is implanted in your left chest, just under the skin. The device records a pattern of your heart's rhythm, called an EKG. Your device records automatic EKGs, depending on how your caregiver programs it. You may also receive a handheld controller. You press a button on the controller when you have symptoms, such as dizziness, lightheadedness, or palpitations. The device will record an EKG at that moment. The recording can help your caregiver see if your symptoms may be caused by heart rhythm problems. Your caregiver will remove the device after it has collected enough data. You may need the device for up to 3 years. The procedure to remove the device is similar to the procedure used to implant it.

## 2024-09-24 ENCOUNTER — IN-CLINIC DEVICE VISIT (OUTPATIENT)
Dept: CARDIOLOGY CLINIC | Facility: CLINIC | Age: 79
End: 2024-09-24

## 2024-09-24 ENCOUNTER — OFFICE VISIT (OUTPATIENT)
Dept: NEUROLOGY | Facility: CLINIC | Age: 79
End: 2024-09-24
Payer: MEDICARE

## 2024-09-24 VITALS
OXYGEN SATURATION: 97 % | HEART RATE: 83 BPM | DIASTOLIC BLOOD PRESSURE: 66 MMHG | WEIGHT: 119.8 LBS | TEMPERATURE: 95.7 F | SYSTOLIC BLOOD PRESSURE: 138 MMHG | BODY MASS INDEX: 23.52 KG/M2 | HEIGHT: 60 IN

## 2024-09-24 DIAGNOSIS — Z95.818 PRESENCE OF OTHER CARDIAC IMPLANTS AND GRAFTS: Primary | ICD-10-CM

## 2024-09-24 DIAGNOSIS — I63.9 STROKE (HCC): ICD-10-CM

## 2024-09-24 DIAGNOSIS — Z86.73 HISTORY OF CVA (CEREBROVASCULAR ACCIDENT): Primary | ICD-10-CM

## 2024-09-24 PROCEDURE — 99215 OFFICE O/P EST HI 40 MIN: CPT | Performed by: PSYCHIATRY & NEUROLOGY

## 2024-09-24 PROCEDURE — 99024 POSTOP FOLLOW-UP VISIT: CPT | Performed by: STUDENT IN AN ORGANIZED HEALTH CARE EDUCATION/TRAINING PROGRAM

## 2024-09-24 PROCEDURE — G2211 COMPLEX E/M VISIT ADD ON: HCPCS | Performed by: PSYCHIATRY & NEUROLOGY

## 2024-09-24 NOTE — ASSESSMENT & PLAN NOTE
Follow up in 6 months.     I would be happy to see the patient sooner if any new questions/concerns arise.  Patient/Guardian was advised to the call the office if they have any questions and concerns in the meantime.     Patient/Guardian does understand that if they have any new stroke like symptoms such as facial droop on one side, weakness/paralysis on either side, speech trouble, numbness on one side, balance issues, any vision changes, extreme dizziness or any new headache, to call 9-1-1 immediately or to proceed to the nearest ER immediately.

## 2024-09-24 NOTE — PROGRESS NOTES
Ambulatory Visit  Name: Agatha Villeda      : 1945      MRN: [id number]  Encounter Provider: Aishwarya Stephens MD  Encounter Date: 2024   Encounter department: [facility] [place]    Assessment & Plan  Stroke (HCC)  -for secondary stroke prevention, recommend continuation of combination of aspirin and atorvastatin, stop plavix   -Blood Pressure goal < 130/80, BP is at goal.   -LDL goal <70  -snoring - Sleep medicine referral   Has a loop in place, and no afib detected so far.     Counseling/stroke education -   -I advised patient to avoid using NSAIDs for headaches or other pain and to stick to tylenol if needed  -Recommend lifestyle modifications such as mediterranean diet & regular exercise regimen atleast 4-5 times a week for 20-30 minutes.   -I educated patient/family regarding medication compliance  Orders:    Ambulatory referral to Neurology    Ambulatory Referral to Sleep Medicine; Future    History of CVA (cerebrovascular accident)       Follow up in 6 months.     I would be happy to see the patient sooner if any new questions/concerns arise.  Patient/Guardian was advised to the call the office if they have any questions and concerns in the meantime.     Patient/Guardian does understand that if they have any new stroke like symptoms such as facial droop on one side, weakness/paralysis on either side, speech trouble, numbness on one side, balance issues, any vision changes, extreme dizziness or any new headache, to call  immediately or to proceed to the nearest ER immediately.      There are no Patient Instructions on file for this visit.   History of Present Illness   HPI    This is a 79 y.o.  Female who presents as a hospital follow up patient presented with weakness in right leg. She was on a cruise on Monday when symptoms started. She felt right leg weakness, and was dragging. She also felt numbness of both hands, along with stiffness in left  elbow.  Symptoms have gradually improved.  She never had this before. She takes baby aspirin on daily basis.    Patient is doing well. Patient is not driving and she has some mild weakness of the right leg but she denies any new TIA/CVA like symptoms. She is doing well with her medications, and seems to be tolerating them well without any issues. She is otherwise healthy, and her BP is good today.    Review of Systems   Constitutional: Negative.    HENT: Negative.     Eyes: Negative.    Respiratory: Negative.     Cardiovascular: Negative.    Gastrointestinal: Negative.    Endocrine: Negative.    Genitourinary: Negative.    Musculoskeletal: Negative.    Skin: Negative.    Allergic/Immunologic: Negative.    Neurological: Negative.    Hematological: Negative.    Psychiatric/Behavioral: Negative.     All other systems reviewed and are negative.    I have personally reviewed the MA's review of systems and made changes as necessary.    Pertinent Medical History   Medical History Reviewed by provider this encounter:       Past Medical History   Past Medical History:   Diagnosis Date    Carotid artery narrowings     Cataracts, bilateral     Stroke (HCC)      Past Surgical History:   Procedure Laterality Date    CARDIAC ELECTROPHYSIOLOGY PROCEDURE N/A 9/11/2024    Procedure: Cardiac loop recorder implant;  Surgeon: Seth Cadet MD;  [facility];  Service: Cardiology    CATARACT EXTRACTION Bilateral     DENTAL SURGERY       Family History   Problem Relation Age of Onset    Cancer Father      Current Outpatient Medications on File Prior to Visit   Medication Sig Dispense Refill    acetaminophen (TYLENOL) 325 mg tablet Take 2 tablets (650 mg total) by mouth every 4 (four) hours as needed for mild pain, headaches or fever 30 tablet 0    amLODIPine (NORVASC) 5 mg tablet Take 1 tablet (5 mg total) by mouth daily 90 tablet 3    aspirin 81 mg chewable tablet Chew 1 tablet (81 mg total) daily 30 tablet 0     atorvastatin (LIPITOR) 40 mg tablet Take 1 tablet (40 mg total) by mouth every evening 90 tablet 1    folic acid (FOLVITE) 800 MCG tablet Take 800 mcg by mouth daily      pantoprazole (PROTONIX) 40 mg tablet Take 1 tablet (40 mg total) by mouth daily in the early morning 90 tablet 1    polyethylene glycol (MIRALAX) 17 g packet Take 17 g by mouth daily as needed (Constipation) 10 each 0    sucralfate (CARAFATE) 1 g tablet Take 1 tablet (1 g total) by mouth 4 (four) times a day (before meals and at bedtime) 360 tablet 2    vitamin B-12 (VITAMIN B-12) 1,000 mcg tablet Take by mouth daily      [DISCONTINUED] clopidogrel (PLAVIX) 75 mg tablet Take 1 tablet (75 mg total) by mouth daily 90 tablet 1     No current facility-administered medications on file prior to visit.   No Known Allergies   Current Outpatient Medications on File Prior to Visit   Medication Sig Dispense Refill    acetaminophen (TYLENOL) 325 mg tablet Take 2 tablets (650 mg total) by mouth every 4 (four) hours as needed for mild pain, headaches or fever 30 tablet 0    amLODIPine (NORVASC) 5 mg tablet Take 1 tablet (5 mg total) by mouth daily 90 tablet 3    aspirin 81 mg chewable tablet Chew 1 tablet (81 mg total) daily 30 tablet 0    atorvastatin (LIPITOR) 40 mg tablet Take 1 tablet (40 mg total) by mouth every evening 90 tablet 1    folic acid (FOLVITE) 800 MCG tablet Take 800 mcg by mouth daily      pantoprazole (PROTONIX) 40 mg tablet Take 1 tablet (40 mg total) by mouth daily in the early morning 90 tablet 1    polyethylene glycol (MIRALAX) 17 g packet Take 17 g by mouth daily as needed (Constipation) 10 each 0    sucralfate (CARAFATE) 1 g tablet Take 1 tablet (1 g total) by mouth 4 (four) times a day (before meals and at bedtime) 360 tablet 2    vitamin B-12 (VITAMIN B-12) 1,000 mcg tablet Take by mouth daily      [DISCONTINUED] clopidogrel (PLAVIX) 75 mg tablet Take 1 tablet (75 mg total) by mouth daily 90 tablet 1     No current  facility-administered medications on file prior to visit.      Social History     Tobacco Use    Smoking status: Never    Smokeless tobacco: Never   Vaping Use    Vaping status: Never Used   Substance and Sexual Activity    Alcohol use: Not Currently     Comment: rarely    Drug use: Never    Sexual activity: Not on file     Objective     /66   Pulse 83   Temp (!) 95.7 °F (35.4 °C) (Temporal)   Ht 5' (1.524 m)   Wt 54.3 kg (119 lb 12.8 oz)   SpO2 97%   BMI 23.40 kg/m²     Physical Exam    General - patient is alert   Speech - no dysarthria noted, no aphasia noted.     Neuro:   Cranial nerves: PERRL, EOMI, facial sensation intact to soft touch in V1, V2 and V3, no facial asymmetry noted, uvula/palate midline, tongue midline.   Motor: 5/5 throughout, normal tone, no pronator drift noted.   Sensory - intact to soft touch throughout  Reflexes - 2+ throughout  Coordination - no ataxia/dysmetria noted  Gait - normal      Results/Data:  I have reviewed the results and images from the in detail with the patient.        Administrative Statements   I have spent a total time of 40 minutes in caring for this patient on the day of the visit/encounter including Risks and benefits of tx options, Instructions for management, Counseling / Coordination of care, Documenting in the medical record, Reviewing / ordering tests, medicine, procedures  , Obtaining or reviewing history  , and Communicating with other healthcare professionals .

## 2024-09-24 NOTE — PROGRESS NOTES
Results for orders placed or performed in visit on 09/24/24   Cardiac EP device report    Narrative    MDT LNQ22 ILR/ ACTIVE SYSTEM IS MRI CONDITIONAL  DEVICE INTERROGATED IN THE [place] OFFICE: BATTERY VOLTAGE ADEQUATE. NO PATIENT OR DEVICE ACTIVATED EPISODES. R WAVE 0.46mV. 0% PVC BURDEN. WOUND CHECK: INCISION CLEAN AND DRY WITH EDGES APPROXIMATED; SUTURES REMOVED; WOUND CARE AND RESTRICTIONS REVIEWED WITH PATIENT. NC

## 2024-10-21 ENCOUNTER — APPOINTMENT (OUTPATIENT)
Dept: LAB | Facility: CLINIC | Age: 79
End: 2024-10-21
Payer: MEDICARE

## 2024-10-21 DIAGNOSIS — I63.9 ACUTE CVA (CEREBROVASCULAR ACCIDENT) (HCC): ICD-10-CM

## 2024-10-21 DIAGNOSIS — D64.9 ACUTE ANEMIA: ICD-10-CM

## 2024-10-21 DIAGNOSIS — Z13.29 SCREENING FOR THYROID DISORDER: ICD-10-CM

## 2024-10-21 DIAGNOSIS — Z11.59 NEED FOR HEPATITIS C SCREENING TEST: ICD-10-CM

## 2024-10-21 DIAGNOSIS — D53.9 NUTRITIONAL ANEMIA, UNSPECIFIED: ICD-10-CM

## 2024-10-21 DIAGNOSIS — D50.0 IRON DEFICIENCY ANEMIA DUE TO CHRONIC BLOOD LOSS: ICD-10-CM

## 2024-10-21 DIAGNOSIS — Z78.0 ASYMPTOMATIC POSTMENOPAUSAL STATE: ICD-10-CM

## 2024-10-21 DIAGNOSIS — E55.9 VITAMIN D DEFICIENCY: ICD-10-CM

## 2024-10-21 DIAGNOSIS — D75.839 THROMBOCYTOSIS: ICD-10-CM

## 2024-10-21 DIAGNOSIS — D50.0 ANEMIA DUE TO GI BLOOD LOSS: ICD-10-CM

## 2024-10-21 DIAGNOSIS — Z00.00 MEDICARE ANNUAL WELLNESS VISIT, SUBSEQUENT: ICD-10-CM

## 2024-10-21 LAB
25(OH)D3 SERPL-MCNC: 55 NG/ML (ref 30–100)
ALBUMIN SERPL BCG-MCNC: 4.4 G/DL (ref 3.5–5)
ALP SERPL-CCNC: 89 U/L (ref 34–104)
ALT SERPL W P-5'-P-CCNC: 17 U/L (ref 7–52)
ANION GAP SERPL CALCULATED.3IONS-SCNC: 10 MMOL/L (ref 4–13)
AST SERPL W P-5'-P-CCNC: 23 U/L (ref 13–39)
BASOPHILS # BLD AUTO: 0.06 THOUSANDS/ΜL (ref 0–0.1)
BASOPHILS NFR BLD AUTO: 1 % (ref 0–1)
BILIRUB SERPL-MCNC: 0.5 MG/DL (ref 0.2–1)
BUN SERPL-MCNC: 16 MG/DL (ref 5–25)
CALCIUM SERPL-MCNC: 9.9 MG/DL (ref 8.4–10.2)
CHLORIDE SERPL-SCNC: 99 MMOL/L (ref 96–108)
CO2 SERPL-SCNC: 29 MMOL/L (ref 21–32)
CREAT SERPL-MCNC: 0.82 MG/DL (ref 0.6–1.3)
EOSINOPHIL # BLD AUTO: 0.12 THOUSAND/ΜL (ref 0–0.61)
EOSINOPHIL NFR BLD AUTO: 1 % (ref 0–6)
ERYTHROCYTE [DISTWIDTH] IN BLOOD BY AUTOMATED COUNT: 12.6 % (ref 11.6–15.1)
FERRITIN SERPL-MCNC: 36 NG/ML (ref 11–307)
FOLATE SERPL-MCNC: >22.3 NG/ML
GFR SERPL CREATININE-BSD FRML MDRD: 68 ML/MIN/1.73SQ M
GLUCOSE SERPL-MCNC: 114 MG/DL (ref 65–140)
HCT VFR BLD AUTO: 41.5 % (ref 34.8–46.1)
HCV AB SER QL: NORMAL
HGB BLD-MCNC: 13.3 G/DL (ref 11.5–15.4)
IMM GRANULOCYTES # BLD AUTO: 0.02 THOUSAND/UL (ref 0–0.2)
IMM GRANULOCYTES NFR BLD AUTO: 0 % (ref 0–2)
IRON SATN MFR SERPL: 20 % (ref 15–50)
IRON SERPL-MCNC: 78 UG/DL (ref 50–212)
LYMPHOCYTES # BLD AUTO: 1.18 THOUSANDS/ΜL (ref 0.6–4.47)
LYMPHOCYTES NFR BLD AUTO: 12 % (ref 14–44)
MCH RBC QN AUTO: 31.4 PG (ref 26.8–34.3)
MCHC RBC AUTO-ENTMCNC: 32 G/DL (ref 31.4–37.4)
MCV RBC AUTO: 98 FL (ref 82–98)
MONOCYTES # BLD AUTO: 0.48 THOUSAND/ΜL (ref 0.17–1.22)
MONOCYTES NFR BLD AUTO: 5 % (ref 4–12)
NEUTROPHILS # BLD AUTO: 8.42 THOUSANDS/ΜL (ref 1.85–7.62)
NEUTS SEG NFR BLD AUTO: 81 % (ref 43–75)
NRBC BLD AUTO-RTO: 0 /100 WBCS
PLATELET # BLD AUTO: 361 THOUSANDS/UL (ref 149–390)
PMV BLD AUTO: 10 FL (ref 8.9–12.7)
POTASSIUM SERPL-SCNC: 3.8 MMOL/L (ref 3.5–5.3)
PROT SERPL-MCNC: 7.5 G/DL (ref 6.4–8.4)
RBC # BLD AUTO: 4.23 MILLION/UL (ref 3.81–5.12)
SODIUM SERPL-SCNC: 138 MMOL/L (ref 135–147)
TIBC SERPL-MCNC: 381 UG/DL (ref 250–450)
TSH SERPL DL<=0.05 MIU/L-ACNC: 1.19 UIU/ML (ref 0.45–4.5)
UIBC SERPL-MCNC: 303 UG/DL (ref 155–355)
VIT B12 SERPL-MCNC: 1554 PG/ML (ref 180–914)
WBC # BLD AUTO: 10.28 THOUSAND/UL (ref 4.31–10.16)

## 2024-10-21 PROCEDURE — 82607 VITAMIN B-12: CPT

## 2024-10-21 PROCEDURE — 82306 VITAMIN D 25 HYDROXY: CPT

## 2024-10-21 PROCEDURE — 82728 ASSAY OF FERRITIN: CPT

## 2024-10-21 PROCEDURE — 83540 ASSAY OF IRON: CPT

## 2024-10-21 PROCEDURE — 80053 COMPREHEN METABOLIC PANEL: CPT

## 2024-10-21 PROCEDURE — 86803 HEPATITIS C AB TEST: CPT

## 2024-10-21 PROCEDURE — 83550 IRON BINDING TEST: CPT

## 2024-10-21 PROCEDURE — 85025 COMPLETE CBC W/AUTO DIFF WBC: CPT

## 2024-10-21 PROCEDURE — 36415 COLL VENOUS BLD VENIPUNCTURE: CPT

## 2024-10-21 PROCEDURE — 84443 ASSAY THYROID STIM HORMONE: CPT

## 2024-10-21 PROCEDURE — 82746 ASSAY OF FOLIC ACID SERUM: CPT

## 2024-10-24 ENCOUNTER — TELEPHONE (OUTPATIENT)
Dept: FAMILY MEDICINE CLINIC | Facility: CLINIC | Age: 79
End: 2024-10-24

## 2024-10-24 DIAGNOSIS — M81.0 OSTEOPOROSIS WITHOUT CURRENT PATHOLOGICAL FRACTURE, UNSPECIFIED OSTEOPOROSIS TYPE: Primary | ICD-10-CM

## 2024-10-24 RX ORDER — ALENDRONATE SODIUM 70 MG/1
70 TABLET ORAL
Qty: 4 TABLET | Refills: 5 | Status: SHIPPED | OUTPATIENT
Start: 2024-10-24

## 2024-10-24 NOTE — TELEPHONE ENCOUNTER
Pt with osteoporosis on DEXA scan  Vitamin D level 55  CMP recently done  Spoke with pt and recommended 1200 of calcium and 1000 of vitamin D   Will start oral medication  Has follow up 12/3 with myself

## 2024-10-28 ENCOUNTER — OFFICE VISIT (OUTPATIENT)
Dept: HEMATOLOGY ONCOLOGY | Facility: CLINIC | Age: 79
End: 2024-10-28
Payer: MEDICARE

## 2024-10-28 VITALS
SYSTOLIC BLOOD PRESSURE: 124 MMHG | WEIGHT: 118 LBS | HEIGHT: 60 IN | BODY MASS INDEX: 23.16 KG/M2 | OXYGEN SATURATION: 99 % | DIASTOLIC BLOOD PRESSURE: 70 MMHG | TEMPERATURE: 97.5 F | HEART RATE: 86 BPM | RESPIRATION RATE: 18 BRPM

## 2024-10-28 DIAGNOSIS — D50.0 IRON DEFICIENCY ANEMIA DUE TO CHRONIC BLOOD LOSS: ICD-10-CM

## 2024-10-28 DIAGNOSIS — D50.0 ANEMIA DUE TO GI BLOOD LOSS: Primary | ICD-10-CM

## 2024-10-28 DIAGNOSIS — D75.839 THROMBOCYTOSIS: ICD-10-CM

## 2024-10-28 DIAGNOSIS — E53.8 FOLATE DEFICIENCY: ICD-10-CM

## 2024-10-28 DIAGNOSIS — D72.828 NEUTROPHILIA: ICD-10-CM

## 2024-10-28 DIAGNOSIS — E53.8 VITAMIN B 12 DEFICIENCY: ICD-10-CM

## 2024-10-28 PROCEDURE — 99215 OFFICE O/P EST HI 40 MIN: CPT | Performed by: PHYSICIAN ASSISTANT

## 2024-10-28 PROCEDURE — G2211 COMPLEX E/M VISIT ADD ON: HCPCS | Performed by: PHYSICIAN ASSISTANT

## 2024-10-28 RX ORDER — BACILLUS COAGULANS 1B CELL
1 CAPSULE ORAL EVERY OTHER DAY
COMMUNITY

## 2024-10-28 NOTE — PROGRESS NOTES
[address]  [facility] [place]  [address]  [facility] [id number]  Hematology Ambulatory Follow-Up  Agatha Villeda, 1945, [id number]  10/28/2024      Assessment and Plan   1. Anemia due to GI blood loss; 2. Iron deficiency anemia due to chronic blood loss  This is a 79-year-old female who had GI irritation and anemia related to blood loss.  Patient received Venofer as well as 2 packs of bright red blood cells in the hospital.  Patient has done well posthospitalization.  Initial ferritin level was a 71 likely contaminant from blood transfusion.  Patient's ferritin now is down to 36 however the patient is feeling quite well.  Energy level is good.  She is active, no sequela of iron deficiency at this time.    We discussed options.  My concern is that the patient would develop iron deficiency if not supplemented in some way.  We discussed pros and cons of IV administration versus oral.  The side effects of iron are discussed, primarily GI in type, such as cramping, constipation, black stools. Start with low dose of ferrous sulfate 325 mgm once Monday Wednesday Friday, and if tolerated, increase dose to daily.  If the patient has side effects, IV iron would then be administered.  Patient tolerated Venofer without significant side effects.    Recommendation:  Vitron-C p.o. daily, Monday Wednesday Friday as tolerated.    Follow-up in 3 months.  - CBC and differential; Future  - Iron Panel (Includes Ferritin, Iron Sat%, Iron, and TIBC); Future  - Vitamin B12; Future  - Folate; Future  - Sedimentation rate, automated; Future  - C-reactive protein; Future    3.  Neutrophilia   I discussed causes of neutrophilia.  These include but are not limited to:  Primary issues including myeloproliferative neoplasms, Down syndrome, genetic/inherited conditions   Secondary causes which would include infection, inflammation- including non- hematologic malignancies, medications-steroids  and G-CSF, aspelnia and Hypospelnism, Pregnancy, cigarette smoking, stress including physical or emotional, vigorous activity.    Patient's neutrophilia is very slight.  We will recheck.  Patient did not have an infection or was not irritated from allergies at the time of blood drawl.  Will continue to follow-up with neutrophilia.  Patient is a non-smoker.     Observation remains viable.    4. Vitamin B 12 deficiency  Stable to slightly supratherapeutic, will reduce dose by half.  Patient will now follow the regimen below.    Regimen:  B12 1500 mcg p.o. gummy daily    - Vitamin B12; Future    5. Folate deficiency  Stable now on supplement.  Patient should continue this as outlined below.    Regimen:  Folate 800 mg p.o. daily  - Folate; Future    6.  Thrombocytosis  Resolved.    The patient is scheduled for follow-up in approximately 3 months.     Patient voiced agreement and understanding to the above.   Patient advised to call the Hematology/Oncology office with any questions and concerns regarding the above.    I have spent a total time of 41 minutes in caring for this patient on the day of the visit/encounter including Prognosis, Risks and benefits of tx options, Instructions for management, Patient and family education, Impressions, Counseling / Coordination of care, Reviewing / ordering tests, medicine, procedures  , and Obtaining or reviewing history  .  Barrier(s) to care: None  The patient is  able to self care.    Amy Montgomery PA-C  Medical Oncology/Hematology  [facility]    Subjective     Chief Complaint   Patient presents with    Follow-up       History of present illness:   This is a 79-year-old female with past medical history of CVA in May 2024, profound anemia in May 2024 with carotid artery narrowings and bilateral cataracts who presents to hematology for evaluation of thrombocytosis.     Prior to May 2024 patient enjoyed excellent health, did not follow-up with a  physician routinely due to lack of medical concerns.  Patient active living at home, has 2 sisters 181 years of age and another 1 slightly younger.  Patient has good social support.      Hospitalization, patient had changes to bowels, melena noted upon review today.     In May 2024 patient presented to the emergency room with right leg weakness that began 3 days prior.  MRI revealed multifocal acute/subacute infarcts involving bilateral frontal parietal and occipital lobes.  On admission patient's hemoglobin was noted to be 6.7 and after hydration dropped to 5.8.  Patient received 2 packed red blood cells along with Venofer 400 mg-total without side effects.              5/10/2024 ferritin 19, iron saturation 2%, TIBC 682                          WBC 9.6, hemoglobin 6.7, MCV 70, RDW 17.8, platelet count 779  EDG during admission found  large type III hiatal hernia with moderate edematous, scarred, ulcerated mucosa with erosion in the esophagus.  Colonoscopy was benign.     6/14/2024 WBC 10.7, hemoglobin 11.6, MCV 80, RDW 30.5, platelet count 272       07/09/24: Patient notes no residual issues with CVA.  Patient underwent rehab and is feeling quite well.  Patient does admit to melena in the past however no melena since discharge from hospital.  Patient is tolerating Sucarafate without significant toxicity.  Patient has not been taking oral iron outpatient, never advised likely secondary to sick Carafate and upper GI disturbances.    7/12/2024 WBC 8.5, hemoglobin 12.7, platelet count 336, neutrophil 6.5   Ferritin 71, B12 361, folate 8     Recommendation: B12 3000 gummy daily + folic acid 800    10/21/2024: WBC 10.2, hemoglobin 13.3, MCV 98, platelet count 361, ANC 8.4, other differential WNL  ferritin = 36, iron saturation 20%, TIBC 381   B12 1554, greater than 22.3   TSH 1.18    Interval history: Patient notes that she is feeling well.  Patient's activity level is back to baseline no retained neurologic side  effects from CVA.  Patient is active.  She presents with her friend today.  Patient recalled counseling from last visit.  Patient has been compliant on both folate and B12 supplementation.  Patient has a bowel movement every 2 to 3 days normally.    Review of Systems   Constitutional:  Negative for activity change, appetite change, fatigue, fever and unexpected weight change.   HENT:  Negative for nosebleeds.    Respiratory:  Negative for cough, choking and shortness of breath.         Negative hemoptysis.   Cardiovascular:  Negative for chest pain, palpitations and leg swelling.   Gastrointestinal: Negative.  Negative for abdominal distention, abdominal pain, anal bleeding, blood in stool, constipation, diarrhea, nausea and vomiting.   Endocrine: Negative.  Negative for cold intolerance.   Genitourinary: Negative.  Negative for hematuria, menstrual problem, vaginal bleeding, vaginal discharge and vaginal pain.   Musculoskeletal:  Positive for arthralgias. Negative for myalgias, neck pain and neck stiffness.   Skin: Negative.  Negative for color change, pallor and rash.   Allergic/Immunologic: Negative.  Negative for immunocompromised state.   Neurological: Negative.  Negative for weakness and headaches.   Hematological:  Negative for adenopathy. Does not bruise/bleed easily.   All other systems reviewed and are negative.      Patient Active Problem List   Diagnosis    Acute CVA (cerebrovascular accident) (HCC)    Acute anemia    Erythema of upper extremity    Thrombocytosis    Superficial thrombophlebitis of right upper extremity    Carotid stenosis, asymptomatic, bilateral    History of CVA (cerebrovascular accident)     Past Medical History:   Diagnosis Date    Carotid artery narrowings     Cataracts, bilateral     Stroke (HCC)      Past Surgical History:   Procedure Laterality Date    CARDIAC ELECTROPHYSIOLOGY PROCEDURE N/A 9/11/2024    Procedure: Cardiac loop recorder implant;  Surgeon: Seth Cadet MD;   [facility];  Service: Cardiology    CATARACT EXTRACTION Bilateral     DENTAL SURGERY       Family History   Problem Relation Age of Onset    Cancer Father      Social History     Socioeconomic History    Marital status:      Spouse name: None    Number of children: None    Years of education: None    Highest education level: None   Occupational History    None   Tobacco Use    Smoking status: Never    Smokeless tobacco: Never   Vaping Use    Vaping status: Never Used   Substance and Sexual Activity    Alcohol use: Not Currently     Comment: rarely    Drug use: Never    Sexual activity: None   Other Topics Concern    None   Social History Narrative    None     Social Determinants of Health     Financial Resource Strain: Not on file   Food Insecurity: No Food Insecurity (6/11/2024)    Nursing - Inadequate Food Risk Classification     Worried About Running Out of Food in the Last Year: Never true     Ran Out of Food in the Last Year: Never true     Ran Out of Food in the Last Year: Not on file   Transportation Needs: No Transportation Needs (6/11/2024)    PRAPARE - Transportation     Lack of Transportation (Medical): No     Lack of Transportation (Non-Medical): No   Physical Activity: Not on file   Stress: Not on file   Social Connections: Unknown (6/18/2024)    Received from [facility]    Social [employer]     How often do you feel lonely or isolated from those around you? (Adult - for ages 18 years and over): Not on file   Intimate Partner Violence: Not on file   Housing Stability: Low Risk  (6/11/2024)    Housing Stability Vital Sign     Unable to Pay for Housing in the Last Year: No     Number of Times Moved in the Last Year: 1     Homeless in the Last Year: No       Current Outpatient Medications:     acetaminophen (TYLENOL) 325 mg tablet, Take 2 tablets (650 mg total) by mouth every 4 (four) hours as needed for mild pain, headaches or fever, Disp: 30 tablet, Rfl: 0    alendronate  (Fosamax) 70 mg tablet, Take 1 tablet (70 mg total) by mouth every 7 days, Disp: 4 tablet, Rfl: 5    amLODIPine (NORVASC) 5 mg tablet, Take 1 tablet (5 mg total) by mouth daily, Disp: 90 tablet, Rfl: 3    aspirin 81 mg chewable tablet, Chew 1 tablet (81 mg total) daily, Disp: 30 tablet, Rfl: 0    atorvastatin (LIPITOR) 40 mg tablet, Take 1 tablet (40 mg total) by mouth every evening, Disp: 90 tablet, Rfl: 1    Calcium Carbonate-Vit D-Min (CALCIUM 1200 PO), Take by mouth, Disp: , Rfl:     folic acid (FOLVITE) 800 MCG tablet, Take 800 mcg by mouth daily, Disp: , Rfl:     pantoprazole (PROTONIX) 40 mg tablet, Take 1 tablet (40 mg total) by mouth daily in the early morning, Disp: 90 tablet, Rfl: 1    polyethylene glycol (MIRALAX) 17 g packet, Take 17 g by mouth daily as needed (Constipation), Disp: 10 each, Rfl: 0    vitamin B-12 (VITAMIN B-12) 1,000 mcg tablet, Take by mouth daily, Disp: , Rfl:     VITAMIN D PO, Take by mouth, Disp: , Rfl:   No Known Allergies    Objective   /70 (BP Location: Right arm, Patient Position: Sitting, Cuff Size: Adult)   Pulse 86   Temp 97.5 °F (36.4 °C)   Resp 18   Ht 5' (1.524 m)   Wt 53.5 kg (118 lb)   SpO2 99%   BMI 23.05 kg/m²    Physical Exam  Constitutional:       General: She is not in acute distress.     Appearance: She is well-developed.   HENT:      Head: Normocephalic and atraumatic.   Eyes:      General: No scleral icterus.     Conjunctiva/sclera: Conjunctivae normal.   Cardiovascular:      Rate and Rhythm: Normal rate.   Pulmonary:      Effort: Pulmonary effort is normal. No respiratory distress.   Skin:     General: Skin is warm.      Coloration: Skin is not pale.      Findings: No rash.   Neurological:      Mental Status: She is alert and oriented to person, place, and time.   Psychiatric:         Thought Content: Thought content normal.         Result Review  Labs:  Appointment on 10/21/2024   Component Date Value Ref Range Status    Vitamin B-12 10/21/2024  1,554 (H)  180 - 914 pg/mL Final    Folate 10/21/2024 >22.3  >5.9 ng/mL Final    The World Health Organization has determined deficient folate concentrations are considered to be <4.0 ng/mL.    Hepatitis C Ab 10/21/2024 Non-reactive  Non-Reactive Final    WBC 10/21/2024 10.28 (H)  4.31 - 10.16 Thousand/uL Final    RBC 10/21/2024 4.23  3.81 - 5.12 Million/uL Final    Hemoglobin 10/21/2024 13.3  11.5 - 15.4 g/dL Final    Hematocrit 10/21/2024 41.5  34.8 - 46.1 % Final    MCV 10/21/2024 98  82 - 98 fL Final    MCH 10/21/2024 31.4  26.8 - 34.3 pg Final    MCHC 10/21/2024 32.0  31.4 - 37.4 g/dL Final    RDW 10/21/2024 12.6  11.6 - 15.1 % Final    MPV 10/21/2024 10.0  8.9 - 12.7 fL Final    Platelets 10/21/2024 361  149 - 390 Thousands/uL Final    nRBC 10/21/2024 0  /100 WBCs Final    Segmented % 10/21/2024 81 (H)  43 - 75 % Final    Immature Grans % 10/21/2024 0  0 - 2 % Final    Lymphocytes % 10/21/2024 12 (L)  14 - 44 % Final    Monocytes % 10/21/2024 5  4 - 12 % Final    Eosinophils Relative 10/21/2024 1  0 - 6 % Final    Basophils Relative 10/21/2024 1  0 - 1 % Final    Absolute Neutrophils 10/21/2024 8.42 (H)  1.85 - 7.62 Thousands/µL Final    Absolute Immature Grans 10/21/2024 0.02  0.00 - 0.20 Thousand/uL Final    Absolute Lymphocytes 10/21/2024 1.18  0.60 - 4.47 Thousands/µL Final    Absolute Monocytes 10/21/2024 0.48  0.17 - 1.22 Thousand/µL Final    Eosinophils Absolute 10/21/2024 0.12  0.00 - 0.61 Thousand/µL Final    Basophils Absolute 10/21/2024 0.06  0.00 - 0.10 Thousands/µL Final    Sodium 10/21/2024 138  135 - 147 mmol/L Final    Potassium 10/21/2024 3.8  3.5 - 5.3 mmol/L Final    Chloride 10/21/2024 99  96 - 108 mmol/L Final    CO2 10/21/2024 29  21 - 32 mmol/L Final    ANION GAP 10/21/2024 10  4 - 13 mmol/L Final    BUN 10/21/2024 16  5 - 25 mg/dL Final    Creatinine 10/21/2024 0.82  0.60 - 1.30 mg/dL Final    Standardized to IDMS reference method    Glucose 10/21/2024 114  65 - 140 mg/dL Final    If  the patient is fasting, the ADA then defines impaired fasting glucose as > 100 mg/dL and diabetes as > or equal to 123 mg/dL.    Calcium 10/21/2024 9.9  8.4 - 10.2 mg/dL Final    AST 10/21/2024 23  13 - 39 U/L Final    ALT 10/21/2024 17  7 - 52 U/L Final    Specimen collection should occur prior to Sulfasalazine administration due to the potential for falsely depressed results.     Alkaline Phosphatase 10/21/2024 89  34 - 104 U/L Final    Total Protein 10/21/2024 7.5  6.4 - 8.4 g/dL Final    Albumin 10/21/2024 4.4  3.5 - 5.0 g/dL Final    Total Bilirubin 10/21/2024 0.50  0.20 - 1.00 mg/dL Final    Use of this assay is not recommended for patients undergoing treatment with eltrombopag due to the potential for falsely elevated results.  N-acetyl-p-benzoquinone imine (metabolite of Acetaminophen) will generate erroneously low results in samples for patients that have taken an overdose of Acetaminophen.    eGFR 10/21/2024 68  ml/min/1.73sq m Final    TSH 3RD GENERATON 10/21/2024 1.187  0.450 - 4.500 uIU/mL Final    The recommended reference ranges for TSH during pregnancy are as follows:   First trimester 0.100 to 2.500 uIU/mL   Second trimester  0.200 to 3.000 uIU/mL   Third trimester 0.300 to 3.000 uIU/m    Note: Normal ranges may not apply to patients who are transgender, non-binary, or whose legal sex, sex at birth, and gender identity differ.  Adult TSH (3rd generation) reference range follows the recommended guidelines of the American Thyroid Association, January, 2020.    Vit D, 25-Hydroxy 10/21/2024 55.0  30.0 - 100.0 ng/mL Final    Vitamin D guidelines established by Clinical Guidelines Subcommittee  of the Endocrine Society Task Force, 2011    Deficiency <20ng/ml   Insufficiency 20-30ng/ml   Sufficient  ng/ml     Iron Saturation 10/21/2024 20  15 - 50 % Final    TIBC 10/21/2024 381  250 - 450 ug/dL Final    Iron 10/21/2024 78  50 - 212 ug/dL Final    Patients treated with metal-binding drugs (ie.  Deferoxamine) may have depressed iron values.    UIBC 10/21/2024 303  155 - 355 ug/dL Final    Ferritin 10/21/2024 36  11 - 307 ng/mL Final       Please note:  This report has been generated by a voice recognition software system. Therefore there may be syntax, spelling, and/or grammatical errors. Please call if you have any questions.

## 2024-10-29 ENCOUNTER — ANESTHESIA EVENT (OUTPATIENT)
Dept: ANESTHESIOLOGY | Facility: HOSPITAL | Age: 79
End: 2024-10-29

## 2024-10-29 ENCOUNTER — ANESTHESIA (OUTPATIENT)
Dept: ANESTHESIOLOGY | Facility: HOSPITAL | Age: 79
End: 2024-10-29

## 2024-10-29 NOTE — ANESTHESIA PREPROCEDURE EVALUATION
Procedure:  PRE-OP ONLY    Relevant Problems   HEMATOLOGY   (+) Acute anemia      NEURO/PSYCH   (+) Acute CVA (cerebrovascular accident) (HCC)        Left Ventricle: Left ventricle is normal in size and function.  Estimated LVEF is 70%.  Mild concentric left ventricular hypertrophy.  No regional wall motion abnormality. Diastolic function is mildly abnormal, consistent with grade I (abnormal) relaxation.     Left ventricle is normal in size and function.  Mild concentric left ventricle hypertrophy.  Mild tricuspid regurgitation with mildly elevated RVSP.  No comparison study.    Physical Exam    Airway    Mallampati score: II  TM Distance: >3 FB  Neck ROM: full     Dental   No notable dental hx     Cardiovascular  Cardiovascular exam normal    Pulmonary  Pulmonary exam normal     Other Findings  post-pubertal.  Normal sinus rhythm  Normal ECG  No previous ECGs available        Anesthesia Plan  ASA Score- 3     Anesthesia Type- IV sedation with anesthesia with ASA Monitors.         Additional Monitors:     Airway Plan:            Plan Factors-Exercise tolerance (METS): >4 METS.    Chart reviewed. EKG reviewed. Imaging results reviewed. Existing labs reviewed. Patient summary reviewed.    Patient is not a current smoker.      Obstructive sleep apnea risk education given perioperatively.        Induction- intravenous.    Postoperative Plan-     Perioperative Resuscitation Plan - Level 1 - Full Code.       Informed Consent- Anesthetic plan and risks discussed with patient.  I personally reviewed this patient with the CRNA. Discussed and agreed on the Anesthesia Plan with the CRNA..

## 2024-10-31 ENCOUNTER — HOSPITAL ENCOUNTER (OUTPATIENT)
Dept: GASTROENTEROLOGY | Facility: HOSPITAL | Age: 79
Setting detail: OUTPATIENT SURGERY
End: 2024-10-31
Attending: HOSPITALIST
Payer: MEDICARE

## 2024-10-31 ENCOUNTER — ANESTHESIA EVENT (OUTPATIENT)
Dept: GASTROENTEROLOGY | Facility: HOSPITAL | Age: 79
End: 2024-10-31
Payer: MEDICARE

## 2024-10-31 ENCOUNTER — ANESTHESIA (OUTPATIENT)
Dept: GASTROENTEROLOGY | Facility: HOSPITAL | Age: 79
End: 2024-10-31
Payer: MEDICARE

## 2024-10-31 VITALS
BODY MASS INDEX: 23.16 KG/M2 | WEIGHT: 118 LBS | HEIGHT: 60 IN | DIASTOLIC BLOOD PRESSURE: 60 MMHG | SYSTOLIC BLOOD PRESSURE: 129 MMHG | RESPIRATION RATE: 20 BRPM | TEMPERATURE: 98.9 F | HEART RATE: 82 BPM | OXYGEN SATURATION: 97 %

## 2024-10-31 DIAGNOSIS — K44.9 DIAPHRAGMATIC HERNIA WITHOUT OBSTRUCTION OR GANGRENE: ICD-10-CM

## 2024-10-31 DIAGNOSIS — K21.9 GASTRO-ESOPHAGEAL REFLUX DISEASE WITHOUT ESOPHAGITIS: ICD-10-CM

## 2024-10-31 PROBLEM — E78.5 HLD (HYPERLIPIDEMIA): Status: ACTIVE | Noted: 2024-10-31

## 2024-10-31 PROBLEM — I10 HTN (HYPERTENSION): Status: ACTIVE | Noted: 2024-10-31

## 2024-10-31 RX ORDER — SODIUM CHLORIDE, SODIUM LACTATE, POTASSIUM CHLORIDE, CALCIUM CHLORIDE 600; 310; 30; 20 MG/100ML; MG/100ML; MG/100ML; MG/100ML
INJECTION, SOLUTION INTRAVENOUS CONTINUOUS PRN
Status: DISCONTINUED | OUTPATIENT
Start: 2024-10-31 | End: 2024-10-31

## 2024-10-31 RX ORDER — PROPOFOL 10 MG/ML
INJECTION, EMULSION INTRAVENOUS AS NEEDED
Status: DISCONTINUED | OUTPATIENT
Start: 2024-10-31 | End: 2024-10-31

## 2024-10-31 RX ORDER — LIDOCAINE HYDROCHLORIDE 20 MG/ML
INJECTION, SOLUTION EPIDURAL; INFILTRATION; INTRACAUDAL; PERINEURAL AS NEEDED
Status: DISCONTINUED | OUTPATIENT
Start: 2024-10-31 | End: 2024-10-31

## 2024-10-31 RX ADMIN — Medication 40 MG: at 13:53

## 2024-10-31 RX ADMIN — PROPOFOL 120 MG: 10 INJECTION, EMULSION INTRAVENOUS at 13:53

## 2024-10-31 RX ADMIN — SODIUM CHLORIDE, SODIUM LACTATE, POTASSIUM CHLORIDE, AND CALCIUM CHLORIDE: .6; .31; .03; .02 INJECTION, SOLUTION INTRAVENOUS at 13:48

## 2024-10-31 RX ADMIN — LIDOCAINE HYDROCHLORIDE 100 MG: 20 INJECTION, SOLUTION EPIDURAL; INFILTRATION; INTRACAUDAL; PERINEURAL at 13:53

## 2024-10-31 NOTE — H&P
Procedure(s):    Esophagogastroduodenuoscopy with the indication(s) of Esophagitis      Vitals:    10/31/24 1229   BP: 146/65   Pulse: 93   Resp: 18   Temp: 98.2 °F (36.8 °C)   SpO2: 98%       Physical Exam:  Physical Exam  Eyes:      Conjunctiva/sclera: Conjunctivae normal.   Cardiovascular:      Rate and Rhythm: Normal rate.   Pulmonary:      Effort: Pulmonary effort is normal.   Abdominal:      Palpations: Abdomen is soft.   Neurological:      General: No focal deficit present.      Mental Status: She is alert.   Psychiatric:         Mood and Affect: Mood normal.              Endoscopy Pre-Procedure Assessment:  Prior to the procedure, the patient is identified.  The patient's history, medications, and allergies have been reviewed.  The patient is competent.     Consent:    We have discussed the procedure in detail. We reviewed risks, benefits and alternative as well as potentional complications including and not limited to medication side effect , infection, bleeding, perforation and the potential need for surgery, ICU admission, CPR, as well as the need for blood product transfusion. Patient verbalized understanding and agreement. All patient questions were answered.     After reviewed the risks and benefits, the patient is deemed in satisfactory condition to undergo the procedure.  The anesthesia plan is to use monitored anesthesia care (MAC).      10/31/24

## 2024-10-31 NOTE — ANESTHESIA POSTPROCEDURE EVALUATION
Post-Op Assessment Note    CV Status:  Stable  Pain Score: 0    Pain management: adequate       Mental Status:  Lethargic   Hydration Status:  Stable and euvolemic   PONV Controlled:  None   Airway Patency:  Patent and adequate     Post Op Vitals Reviewed: Yes    No anethesia notable event occurred.    Staff: CRNA           Last Filed PACU Vitals:  Vitals Value Taken Time   Temp 99.2 °F (37.3 °C) 10/31/24 1403   Pulse 86 10/31/24 1403   /55 10/31/24 1403   Resp 22 10/31/24 1403   SpO2 98 % 10/31/24 1403       Modified Lisa:  Activity: 2 (10/31/2024 12:31 PM)  Respiration: 2 (10/31/2024 12:31 PM)  Circulation: 2 (10/31/2024 12:31 PM)  Consciousness: 2 (10/31/2024 12:31 PM)  Oxygen Saturation: 2 (10/31/2024 12:31 PM)  Modified Lisa Score: 10 (10/31/2024 12:31 PM)

## 2024-10-31 NOTE — ANESTHESIA PREPROCEDURE EVALUATION
Procedure:  EGD    Relevant Problems   CARDIO   (+) Carotid stenosis, asymptomatic, bilateral   (+) HLD (hyperlipidemia)   (+) HTN (hypertension)   (+) Superficial thrombophlebitis of right upper extremity      GI/HEPATIC   (+) GERD (gastroesophageal reflux disease)      HEMATOLOGY   (+) Acute anemia      NEURO/PSYCH   (+) Acute CVA (cerebrovascular accident) (HCC)        Physical Exam    Airway    Mallampati score: III  TM Distance: <3 FB  Neck ROM: limited     Dental        Cardiovascular      Pulmonary   No wheezes    Other Findings  post-pubertal.      Anesthesia Plan  ASA Score- 3     Anesthesia Type- IV sedation with anesthesia with ASA Monitors.         Additional Monitors:     Airway Plan:            Plan Factors-    Chart reviewed. EKG reviewed. Imaging results reviewed. Existing labs reviewed. Patient summary reviewed.    Patient is not a current smoker.  Patient did not smoke on day of surgery.            Induction- intravenous.    Postoperative Plan-     Perioperative Resuscitation Plan - Level 1 - Full Code.       Informed Consent- Anesthetic plan and risks discussed with patient.  I personally reviewed this patient with the CRNA. Discussed and agreed on the Anesthesia Plan with the CRNA..      VITALS  /65   Pulse 93   Temp 98.2 °F (36.8 °C) (Oral)   Resp 18   Ht 5' (1.524 m)   Wt 53.5 kg (118 lb)   SpO2 98%   BMI 23.05 kg/m²  BP Readings from Last 3 Encounters:   10/31/24 146/65   10/28/24 124/70   09/24/24 138/66        LABS  Results from Last 12 Months   Lab Units 10/21/24  1156 07/12/24  1003   WBC Thousand/uL 10.28*  --    WHITE BLOOD CELL COUNT. thou/cmm  --  8.5   HEMOGLOBIN g/dL 13.3  --    HEMOGLOBIN. g/dL  --  12.7   HEMATOCRIT % 41.5  --    HEMATOCRIT. %  --  38.7   PLATELETS Thousands/uL 361  --    PLATELETS. thou/cmm  --  336     Results from Last 12 Months   Lab Units 10/21/24  1156 07/12/24  1003 05/13/24  0522 05/11/24  0436   SODIUM mmol/L 138 142   < > 137   POTASSIUM  "mmol/L 3.8 5.1   < > 3.7   CHLORIDE mmol/L 99 102   < > 105   CO2 mmol/L 29 27   < > 24   ANION GAP mmol/L 10 13*   < > 8   BUN mg/dL 16 15   < > 9   CREATININE mg/dL 0.82 0.90   < > 0.80   CALCIUM mg/dL 9.9 9.8   < > 9.1   GLUCOSE RANDOM mg/dL 114 105*   < > 96   HEMOGLOBIN A1C %  --   --   --  5.5    < > = values in this interval not displayed.     No results for input(s): \"APTT\", \"INR\", \"PTT\" in the last 8784 hours.    ECG      ECHOCARDIOGRAPHY OR OTHER TESTING/IMAGING  Encounter Date: 05/10/24   ECG 12 lead   Result Value    Ventricular Rate 88    Atrial Rate 88    KY Interval 132    QRSD Interval 78    QT Interval 352    QTC Interval 425    P Axis 66    QRS Axis 49    T Wave Axis 34    Narrative    Normal sinus rhythm  Normal ECG  No previous ECGs available  Confirmed by Jose Raul Rodriguez [id number]) on 5/10/2024 10:35:07 AM     Results for orders placed or performed in visit on 09/24/24   Cardiac EP device report    Narrative    MDT LNQ22 ILR/ ACTIVE SYSTEM IS MRI CONDITIONAL  DEVICE INTERROGATED IN THE [place] OFFICE: BATTERY VOLTAGE ADEQUATE. NO PATIENT OR DEVICE ACTIVATED EPISODES. R WAVE 0.46mV. 0% PVC BURDEN. WOUND CHECK: INCISION CLEAN AND DRY WITH EDGES APPROXIMATED; SUTURES REMOVED; WOUND CARE AND RESTRICTIONS REVIEWED WITH PATIENT. NC        History    Afib, Acute anemia, R Leg weakness.     Interpretation Summary         Left Ventricle: Left ventricle is normal in size and function.  Estimated LVEF is 70%.  Mild concentric left ventricular hypertrophy.  No regional wall motion abnormality. Diastolic function is mildly abnormal, consistent with grade I (abnormal) relaxation.     Left ventricle is normal in size and function.  Mild concentric left ventricle hypertrophy.  Mild tricuspid regurgitation with mildly elevated RVSP.  No comparison study.     ------- ANESTHESIA RISK-BENEFIT DISCUSSION -------  BENEFITS OF A SPECIALIZED ANESTHESIA TEAM INCLUDE (NBK [id number], PMID [id number]):  (1) Reduce mortality and " morbidity for major surgeries/procedures. (2) The team provides analgesia/sedation/amnesia/akinesia as safely as possible. (3) The team strives to reduce discomfort as safely as possible.    RISKS, AND PLANS TO MITIGATE RISKS, INCLUDE:    - Neurologic system: IntraOp awareness (Risk is ~1:1,000 - 1:14,000; PMID 96581517), Stroke (Risk ~<0.1-2% for most cases; PMID 48793309), nerve injury, vision loss, and POCD.     - Airway and Pulmonary system: Dental or mouth injury, throat pain, critical hypoxia, pneumothorax, prolonged intubation, post-op respiratory compromise.  Airway/Intubation risks and prior data: No [facility]  Major ARISCAT risk factors for pulmonary complications include: none, yielding a score of 0-1= Low risk, 1.6%.  - Cardiovascular system: Hypotension, arrhythmias, cardiac injury or arrest, blood clots, bleeding, infection, vascular injuries.  Golden's RCRI score items: CVA/TIA, yielding an RCRI Score of 1= 0.6% risk of MACE  Are jeffrey-op or intra-op beta blockers indicated? (PMID 51076390): no  - FEN/GI system: Aspiration risk (~0.5% per PMC 4521997) and PONV (10-80% per Apfel score) especially if the patient has not fasted.  ASA NPO guideline compliance?: Yes  - Medication risk assessment: Allergic reactions, excessive bleeding with anticoagulant use, overdoses, drug-drug interactions, injury to a fetus or  in pregnant or breastfeeding patients, jeffrey-procedural sedation including while driving/operating machinery.  Recent relevant medications: See MAR or Med Review  Personal or family history of anesthesia complications: no  Pregnancy Status: N/A  - Estimate mortality risks associated with anesthesia based on ASA-PS (PMID [id number]): ASA-PS III: 1:3,500

## 2024-11-07 ENCOUNTER — APPOINTMENT (OUTPATIENT)
Dept: LAB | Facility: CLINIC | Age: 79
End: 2024-11-07
Payer: MEDICARE

## 2024-11-07 DIAGNOSIS — M81.0 OSTEOPOROSIS WITHOUT CURRENT PATHOLOGICAL FRACTURE, UNSPECIFIED OSTEOPOROSIS TYPE: ICD-10-CM

## 2024-11-07 LAB — PTH-INTACT SERPL-MCNC: 44.3 PG/ML (ref 12–88)

## 2024-11-07 PROCEDURE — 36415 COLL VENOUS BLD VENIPUNCTURE: CPT

## 2024-11-07 PROCEDURE — 83970 ASSAY OF PARATHORMONE: CPT

## 2024-11-18 ENCOUNTER — TELEPHONE (OUTPATIENT)
Age: 79
End: 2024-11-18

## 2024-11-19 ENCOUNTER — RESULTS FOLLOW-UP (OUTPATIENT)
Dept: FAMILY MEDICINE CLINIC | Facility: CLINIC | Age: 79
End: 2024-11-19

## 2024-11-19 DIAGNOSIS — K44.9 DIAPHRAGMATIC HERNIA WITHOUT OBSTRUCTION OR GANGRENE: ICD-10-CM

## 2024-11-19 NOTE — TELEPHONE ENCOUNTER
----- Message from Nasrin Garcia MD sent at 11/19/2024  7:55 AM EST -----  Please have pt schedule appt to discuss treatment for osteoporosis- it can be virtual if she would prefer  thanks

## 2024-11-27 DIAGNOSIS — I63.9 STROKE (HCC): ICD-10-CM

## 2024-11-27 RX ORDER — ATORVASTATIN CALCIUM 40 MG/1
40 TABLET, FILM COATED ORAL EVERY EVENING
Qty: 90 TABLET | Refills: 1 | Status: SHIPPED | OUTPATIENT
Start: 2024-11-27

## 2024-11-27 RX ORDER — PANTOPRAZOLE SODIUM 40 MG/1
40 TABLET, DELAYED RELEASE ORAL
Qty: 90 TABLET | Refills: 1 | Status: SHIPPED | OUTPATIENT
Start: 2024-11-27

## 2024-12-03 ENCOUNTER — OFFICE VISIT (OUTPATIENT)
Dept: FAMILY MEDICINE CLINIC | Facility: CLINIC | Age: 79
End: 2024-12-03
Payer: MEDICARE

## 2024-12-03 VITALS
TEMPERATURE: 97.7 F | WEIGHT: 119 LBS | OXYGEN SATURATION: 98 % | HEIGHT: 60 IN | SYSTOLIC BLOOD PRESSURE: 120 MMHG | HEART RATE: 88 BPM | BODY MASS INDEX: 23.36 KG/M2 | DIASTOLIC BLOOD PRESSURE: 64 MMHG

## 2024-12-03 DIAGNOSIS — M81.0 OSTEOPOROSIS WITHOUT CURRENT PATHOLOGICAL FRACTURE, UNSPECIFIED OSTEOPOROSIS TYPE: Primary | ICD-10-CM

## 2024-12-03 DIAGNOSIS — Z86.73 HISTORY OF CVA (CEREBROVASCULAR ACCIDENT): ICD-10-CM

## 2024-12-03 DIAGNOSIS — I63.9 ACUTE CVA (CEREBROVASCULAR ACCIDENT) (HCC): ICD-10-CM

## 2024-12-03 DIAGNOSIS — I80.8 SUPERFICIAL THROMBOPHLEBITIS OF RIGHT UPPER EXTREMITY: ICD-10-CM

## 2024-12-03 PROCEDURE — 99214 OFFICE O/P EST MOD 30 MIN: CPT | Performed by: FAMILY MEDICINE

## 2024-12-03 PROCEDURE — G2211 COMPLEX E/M VISIT ADD ON: HCPCS | Performed by: FAMILY MEDICINE

## 2024-12-03 NOTE — PROGRESS NOTES
Subjective:    ARLINE Snider is a 79 y.o. female here today for:  Chief Complaint   Patient presents with    Follow-up   .      ---Above per clinical staff & reviewed. ---  HPI:  79-year-old female here for follow-up.  Patient was started on Fosamax due to DEXA scan which showed osteoporosis.  Patient states that she does have some proximal muscle tenderness when she takes her Fosamax.  The muscle aches are more right when she takes her medication.  She did skip a week and states that she felt better.  She does only have 2 tablets left.  Did discuss other treatments for osteoporosis including injections and infusions.  Will reach out to endocrine regarding the criteria for this.  Patient is having surgery in January for her hiatal hernia.  This is being done at [facility].  We will try to get her records from [facility].  Patient has all her follow-up scheduled with cardiology and neurology.  Patient does have her follow-up already scheduled with me.  Patient already had flu shot and declines COVID-vaccine today.    The following portions of the patient's history were reviewed and updated as appropriate: allergies, current medications, past family history, past medical history, past social history, past surgical history and problem list.    Past Medical History:   Diagnosis Date    Carotid artery narrowings     Cataracts, bilateral     Stroke (HCC)        Past Surgical History:   Procedure Laterality Date    CARDIAC ELECTROPHYSIOLOGY PROCEDURE N/A 9/11/2024    Procedure: Cardiac loop recorder implant;  Surgeon: Seth Cadet MD;  Location: [facility];  Service: Cardiology    CATARACT EXTRACTION Bilateral     DENTAL SURGERY         Social History     Socioeconomic History    Marital status:      Spouse name: None    Number of children: None    Years of education: None    Highest education level: None   Occupational History    None   Tobacco Use    Smoking status: Never    Smokeless tobacco: Never    Vaping Use    Vaping status: Never Used   Substance and Sexual Activity    Alcohol use: Not Currently     Comment: rarely    Drug use: Never    Sexual activity: None   Other Topics Concern    None   Social History Narrative    None     Social Drivers of Health     Financial Resource Strain: Not on file   Food Insecurity: No Food Insecurity (6/11/2024)    Nursing - Inadequate Food Risk Classification     Worried About Running Out of Food in the Last Year: Never true     Ran Out of Food in the Last Year: Never true     Ran Out of Food in the Last Year: Not on file   Transportation Needs: No Transportation Needs (6/11/2024)    PRAPARE - Transportation     Lack of Transportation (Medical): No     Lack of Transportation (Non-Medical): No   Physical Activity: Not on file   Stress: Not on file   Social Connections: Unknown (6/18/2024)    Received from [facility]    Social [employer]     How often do you feel lonely or isolated from those around you? (Adult - for ages 18 years and over): Not on file   Intimate Partner Violence: Not on file   Housing Stability: Low Risk  (6/11/2024)    Housing Stability Vital Sign     Unable to Pay for Housing in the Last Year: No     Number of Times Moved in the Last Year: 1     Homeless in the Last Year: No       Current Outpatient Medications   Medication Sig Dispense Refill    acetaminophen (TYLENOL) 325 mg tablet Take 2 tablets (650 mg total) by mouth every 4 (four) hours as needed for mild pain, headaches or fever 30 tablet 0    alendronate (Fosamax) 70 mg tablet Take 1 tablet (70 mg total) by mouth every 7 days 4 tablet 5    amLODIPine (NORVASC) 5 mg tablet Take 1 tablet (5 mg total) by mouth daily 90 tablet 3    aspirin 81 mg chewable tablet Chew 1 tablet (81 mg total) daily 30 tablet 0    atorvastatin (LIPITOR) 40 mg tablet take 1 tablet by mouth every evening 90 tablet 1    Calcium Carbonate-Vit D-Min (CALCIUM 1200 PO) Take by mouth      folic acid (FOLVITE) 800 MCG tablet Take  800 mcg by mouth daily      Iron-Vitamin C (Vitron-C)  MG TABS Take 1 tablet by mouth every other day      pantoprazole (PROTONIX) 40 mg tablet take 1 tablet by mouth daily IN THE EARLY MORNING 90 tablet 1    polyethylene glycol (MIRALAX) 17 g packet Take 17 g by mouth daily as needed (Constipation) 10 each 0    vitamin B-12 (VITAMIN B-12) 1,000 mcg tablet Take by mouth daily      VITAMIN D PO Take by mouth       No current facility-administered medications for this visit.        Review of Systems   Constitutional: Negative.  Negative for chills and fever.   HENT: Negative.  Negative for ear pain and sore throat.    Eyes:  Negative for pain and visual disturbance.   Respiratory: Negative.  Negative for cough and shortness of breath.    Cardiovascular: Negative.  Negative for chest pain and palpitations.   Gastrointestinal: Negative.  Negative for abdominal pain and vomiting.   Genitourinary: Negative.  Negative for dysuria and hematuria.   Musculoskeletal:  Positive for arthralgias and myalgias. Negative for back pain.   Skin:  Negative for color change and rash.   Neurological: Negative.  Negative for seizures and syncope.   Psychiatric/Behavioral: Negative.     All other systems reviewed and are negative.       Objective:    /64 (BP Location: Left arm, Patient Position: Sitting, Cuff Size: Adult)   Pulse 88   Temp 97.7 °F (36.5 °C) (Temporal)   Ht 5' (1.524 m)   Wt 54 kg (119 lb)   SpO2 98%   BMI 23.24 kg/m²   Wt Readings from Last 3 Encounters:   12/03/24 54 kg (119 lb)   10/31/24 53.5 kg (118 lb)   10/30/24 53.5 kg (118 lb)     BP Readings from Last 3 Encounters:   12/03/24 120/64   10/31/24 129/60   10/28/24 124/70       Lab Results   Component Value Date    WBC 10.28 (H) 10/21/2024    HGB 13.3 10/21/2024    HCT 41.5 10/21/2024     10/21/2024    TRIG 87 05/11/2024    HDL 49 (L) 05/11/2024    ALT 17 10/21/2024    AST 23 10/21/2024    K 3.8 10/21/2024    CL 99 10/21/2024    CREATININE  0.82 10/21/2024    BUN 16 10/21/2024    CO2 29 10/21/2024    TSH 1.42 06/14/2024    HGBA1C 5.5 05/11/2024       Physical Exam  Vitals and nursing note reviewed.   Constitutional:       Appearance: Normal appearance. She is well-developed.   HENT:      Head: Normocephalic and atraumatic.   Eyes:      Pupils: Pupils are equal, round, and reactive to light.   Cardiovascular:      Rate and Rhythm: Normal rate and regular rhythm.      Heart sounds: Murmur heard.   Pulmonary:      Effort: Pulmonary effort is normal.      Breath sounds: Normal breath sounds.   Musculoskeletal:      Cervical back: Normal range of motion and neck supple.   Skin:     General: Skin is warm.      Capillary Refill: Capillary refill takes less than 2 seconds.   Neurological:      General: No focal deficit present.      Mental Status: She is alert and oriented to person, place, and time.      Cranial Nerves: No cranial nerve deficit.   Psychiatric:         Mood and Affect: Mood normal.         Behavior: Behavior normal.         Thought Content: Thought content normal.                       Assessment/Plan:   Agatha was seen today for follow-up.    Diagnoses and all orders for this visit:    Osteoporosis without current pathological fracture, unspecified osteoporosis type    Superficial thrombophlebitis of right upper extremity    Acute CVA (cerebrovascular accident) (HCC)    History of CVA (cerebrovascular accident)      Return if symptoms worsen or fail to improve.  There are no Patient Instructions on file for this visit.

## 2024-12-16 ENCOUNTER — OFFICE VISIT (OUTPATIENT)
Dept: SLEEP CENTER | Facility: CLINIC | Age: 79
End: 2024-12-16
Payer: MEDICARE

## 2024-12-16 VITALS
HEIGHT: 60 IN | BODY MASS INDEX: 23.56 KG/M2 | OXYGEN SATURATION: 98 % | SYSTOLIC BLOOD PRESSURE: 108 MMHG | WEIGHT: 120 LBS | DIASTOLIC BLOOD PRESSURE: 70 MMHG | RESPIRATION RATE: 16 BRPM

## 2024-12-16 DIAGNOSIS — I63.9 STROKE (HCC): ICD-10-CM

## 2024-12-16 DIAGNOSIS — G47.8 OTHER SLEEP DISORDERS: ICD-10-CM

## 2024-12-16 DIAGNOSIS — R35.1 NOCTURIA: ICD-10-CM

## 2024-12-16 DIAGNOSIS — R06.83 SNORING: Primary | ICD-10-CM

## 2024-12-16 DIAGNOSIS — I10 PRIMARY HYPERTENSION: ICD-10-CM

## 2024-12-16 DIAGNOSIS — K21.9 GASTROESOPHAGEAL REFLUX DISEASE, UNSPECIFIED WHETHER ESOPHAGITIS PRESENT: ICD-10-CM

## 2024-12-16 PROCEDURE — 99204 OFFICE O/P NEW MOD 45 MIN: CPT | Performed by: STUDENT IN AN ORGANIZED HEALTH CARE EDUCATION/TRAINING PROGRAM

## 2024-12-16 PROCEDURE — G2211 COMPLEX E/M VISIT ADD ON: HCPCS | Performed by: STUDENT IN AN ORGANIZED HEALTH CARE EDUCATION/TRAINING PROGRAM

## 2024-12-16 NOTE — ASSESSMENT & PLAN NOTE
Reviewed the association between sleep and gastroesophageal reflux disease. Encouraged patient to consume last large meal at least 3 hours before bedtime.  Reviewed optimal sleeping position to minimize acid reflux episodes.    Orders:    Home Study; Future

## 2024-12-16 NOTE — PROGRESS NOTES
Name: Agatha Villeda      : 1945      MRN: [id number]  Encounter Provider: Caleb Larios MD  Encounter Date: 2024   Encounter department: [facility] SLEEP MEDICINE [place]  :  Assessment & Plan  Snoring  Snoring / Concern for Obstructive Sleep Apnea - Discussed pathophysiology of BONG, consequences of untreated BONG and treatment options including PAP therapy, mandibular advancement device, positional therapy, and surgical referral. - Discussed risks of sleepy driving and mitigation strategies (napping). Patient agrees to not drive if tired or sleepy. - Advised avoidance of alcohol and centrally acting medications as these can worsen BONG.  -Patient presents with prior history of snoring as well as occasional gasping episode during sleep.  She also has hypertension and history of prior stroke with mild airway crowding on exam.  -Patient with upcoming hernia surgery with planned recovery.  She would like to proceed with a home sleep test as opposed to an in lab study at this time.  -I have asked patient to follow-up with me in the office after sleep testing is completed.    Orders:    Home Study; Future    Stroke (HCC)  With prior hospitalization in May 2024 for CVA, initially presenting with right leg weakness.  She is followed by neurology.  She is continued on aspirin and statin therapy.  We reviewed the importance of performing sleep study to rule out sleep disordered breathing.  We discussed that undiagnosed and untreated sleep disordered breathing may increase risk of recurrent stroke.  Patient to follow-up after sleep testing is completed.  Orders:    Ambulatory Referral to Sleep Medicine    Home Study; Future    Primary hypertension  Hypertension - We reviewed the association between untreated obstructive sleep apnea and the increased risk for hypertension. Patient to continue on prescribed anti-hypertensive therapy and follow up with PCP for continuity of care.     Orders:     Home Study; Future    Gastroesophageal reflux disease, unspecified whether esophagitis present  Reviewed the association between sleep and gastroesophageal reflux disease. Encouraged patient to consume last large meal at least 3 hours before bedtime.  Reviewed optimal sleeping position to minimize acid reflux episodes.    Orders:    Home Study; Future    Nocturia  Patient with 1-2 episodes of nocturia nightly.  We discussed that excessive nocturia may be secondary to undiagnosed sleep disordered breathing.  We will follow-up after sleep testing is completed.  Orders:    Home Study; Future    Other sleep disorders  Patient with occasional afternoon naps.  She does not present with marked excessive daytime sleepiness at today's visit.  We reviewed that occasional short naps are acceptable, but excessive napping may be due to undiagnosed sleep disordered breathing.  For follow-up after sleep testing is completed.  Orders:    Home Study; Future        History of Present Illness     Pertinent positives/negatives included in HPI and also as noted:     Objective   /70   Resp 16   Ht 5' (1.524 m)   Wt 54.4 kg (120 lb)   SpO2 98%   BMI 23.44 kg/m²     Neck Circumference: 13  [facility] New Patient Evaluation    Ms. Villeda is a a 79 y.o. female with a PMH of hypertension, hyperlipidemia, GERD, prior CVA, osteoporosis, who presents as a new patient for evaluation of sleep disordered breathing.     I have reviewed the family medicine office note from 12/3/2024 with Dr. Nasrin Garcia MD.    I have reviewed the neurology office note from 9/24/2024 with Aishwarya Stephens MD.    History of Presenting Illness:    The patient snores. There are choking and gasping episodes. There are no witnessed apneas. 1-2 episode(s) of nocturia occur per night. The patient sleeps on her left side. She sleeps with one pillow. There are no reports of nocturnal behaviors.    The patient's [place] sleepiness scale score is 2/24  (<=10 is normal). She has not been sleepy while driving. She has not had a fall-asleep motor vehicle accident. There are no reports of hypnagogic hallucinations, cataplexy or sleep paralysis.    In terms of the patient's sleep/wake cycle symptoms:  Bedtime: 10pm.   SOL: < 30 minutes  Nocturnal awakenings: 2x Nocturia  Wakeup time: 5-6am  Naps: 1x 15 minutes daily, afternoon doozing    Total sleep time estimate: Approximately 7 hours.     Weekends are approximately similar to week days.    She has not tried any medications for poor sleep in the past.    Her legs do not bother her on trying to initiate sleep.    No AM headaches. No regular nasal sinus congestion.     Past Medical History:   Diagnosis Date    Carotid artery narrowings     Cataracts, bilateral     Stroke (HCC)         Past Surgical History:   Procedure Laterality Date    CARDIAC ELECTROPHYSIOLOGY PROCEDURE N/A 9/11/2024    Procedure: Cardiac loop recorder implant;  Surgeon: Seth Cadet MD;  Location: [facility];  Service: Cardiology    CATARACT EXTRACTION Bilateral     DENTAL SURGERY        No prior upper airway surgeries.     No Known Allergies     Current Outpatient Medications on File Prior to Visit   Medication Sig Dispense Refill    acetaminophen (TYLENOL) 325 mg tablet Take 2 tablets (650 mg total) by mouth every 4 (four) hours as needed for mild pain, headaches or fever 30 tablet 0    alendronate (Fosamax) 70 mg tablet Take 1 tablet (70 mg total) by mouth every 7 days 4 tablet 5    amLODIPine (NORVASC) 5 mg tablet Take 1 tablet (5 mg total) by mouth daily 90 tablet 3    aspirin 81 mg chewable tablet Chew 1 tablet (81 mg total) daily 30 tablet 0    atorvastatin (LIPITOR) 40 mg tablet take 1 tablet by mouth every evening 90 tablet 1    Calcium Carbonate-Vit D-Min (CALCIUM 1200 PO) Take by mouth      folic acid (FOLVITE) 800 MCG tablet Take 800 mcg by mouth daily      Iron-Vitamin C (Vitron-C)  MG TABS Take 1 tablet by mouth every  other day      pantoprazole (PROTONIX) 40 mg tablet take 1 tablet by mouth daily IN THE EARLY MORNING 90 tablet 1    polyethylene glycol (MIRALAX) 17 g packet Take 17 g by mouth daily as needed (Constipation) 10 each 0    vitamin B-12 (VITAMIN B-12) 1,000 mcg tablet Take by mouth daily      VITAMIN D PO Take by mouth       No current facility-administered medications on file prior to visit.     Family History: Her family history includes Cancer in her father.    Maternal Grandfather had snoring.     Sister with BONG on CPAP.     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    Review of Systems: On review of systems, the patient denied AM headaches, or occasional nasal sinus congestion.  No reported chest pain or dyspnea.    Vitals:    12/16/24 0921   BP: 108/70   Resp: 16   SpO2: 98%       Physical Examination:  Gen: No acute distress, not visibly anxious, speaking comfortably  H&N: MM III; no retro/micrognathia; no macroglossia; no visible thyromegaly  Neuro: Alert and oriented x3, interactive  Psych: Pleasant, normal affect  Skin: No visible rashes  Respiratory: No accessory muscle use, breathing comfortably  Cardiac: No visible edema of extremities  Abdomen: Soft, nontender, no distention  Musculoskeletal: Normal ROM Intact of Extremities    Study Results:  I reviewed the following labs:    Lab Results   Component Value Date    FERRITIN 36 10/21/2024       Lab Results   Component Value Date    WBC 10.28 (H) 10/21/2024    HGB 13.3 10/21/2024    HCT 41.5 10/21/2024    MCV 98 10/21/2024     10/21/2024       This SmartLink has not been configured with any valid records.       Lab Results   Component  Value Date    SODIUM 138 10/21/2024    K 3.8 10/21/2024    CL 99 10/21/2024    CO2 29 10/21/2024    BUN 16 10/21/2024    CREATININE 0.82 10/21/2024    GLUC 114 10/21/2024    CALCIUM 9.9 10/21/2024       This SmartLink has not been configured with any valid records.       Lab Results   Component Value Date    III5JVRBLSNH 1.187 10/21/2024    TSH 1.42 06/14/2024          Results/Data:  I have reviewed the results and report from the 5/10/2024 brain MRI.    I have reviewed the results and report from the 7/26/2024 carotid artery ultrasound study.    I answered the patient's questions to the best of my ability. We will continue with longitudinal follow-up for evaluation of sleep disordered breathing. Follow-up will be after sleep testing is completed.    Caleb Larios MD  Sleep Medicine and Internal Medicine  [facility] [address]  12/16/24

## 2024-12-16 NOTE — PATIENT INSTRUCTIONS
"- A sleep study was ordered for you. It can be an in lab sleep study or a portable at home sleep study. It depends on what insurance approves.  Some insurances will only cover home sleep studies unless you have significant medical conditions like stroke or heart attack within the last 6 months, severe COPD, or the use of supplemental oxygen.    - The order goes electronically to the sleep center staff.    - The sleep center will get approval from your insurance and then will call you to schedule the sleep study.    - If you do not hear from the sleep center in 1 week, please call us to check the status of your order.    - There are different locations to get sleep study done.    - Please make sure you know what is the copay associated with your sleep study. Approval does not mean coverage.     - Once your sleep study is done, it can take up to 2 weeks to get results (depending on the volume).    - A follow up appointment to discuss sleep study results is required in most cases. On that visit, we will discuss results and treatment options.    - If your sleep study shows severe sleep apnea please expect contact from the sleep center. We will try to expedite your follow up appointment.    - The best way to communicate with us in through [facility]. Make sure your account is active.    - Once you start CPAP therapy, it is mandatory by insurance, to have a follow up appointment with us within 31-90 days of getting CPAP.     Patient Education     Sleep apnea in adults   The Basics   Written by the doctors and editors at [facility]   What is sleep apnea? -- Sleep apnea is a condition that makes you stop breathing for short periods while you are asleep. There are 2 types of sleep apnea. One is called \"obstructive sleep apnea.\" The other is called \"central sleep apnea.\"  In obstructive sleep apnea, you stop breathing because your throat narrows or closes (figure 1). In central sleep apnea, you stop breathing because your " "brain does not send the right signals to your muscles to make you breathe. When people talk about sleep apnea, they are usually referring to obstructive sleep apnea, which is what this article is about.  People with sleep apnea do not know that they stop breathing when they are asleep. But they do sometimes wake up startled or gasping for breath. They also often hear from loved ones that they snore.  What are the symptoms of sleep apnea? -- The main symptoms of sleep apnea are loud snoring, tiredness, and daytime sleepiness. Other symptoms can include:   Restless sleep   Waking up choking or gasping   Morning headaches, dry mouth, or sore throat   Waking up often to urinate   Waking up feeling unrested or groggy   Trouble thinking clearly or remembering things  Some people with sleep apnea don't have symptoms, or don't realize that they have them.  Should I see a doctor or nurse? -- Yes. If you think that you might have sleep apnea, see your doctor.  Is there a test for sleep apnea? -- Yes. First, your doctor or nurse will ask about your symptoms. If you have a bed partner, they might also ask that person if you snore or gasp in your sleep. If the doctor or nurse suspects that you have sleep apnea, they might send you for a \"sleep study.\"  Sleep studies can sometimes be done at home, but they are usually done in a sleep lab. For the study, you spend the night in the lab, and you are hooked up to different machines that monitor your heart rate, breathing, and other body functions. The results of the test tell your doctor or nurse if you have the disorder.  Is there anything I can do on my own to help my sleep apnea? -- Yes. Some things that might help:   Try to avoid sleeping on your back, if possible. This might help some people.   Lose weight, if you have excess body weight.   Get regular physical activity. This might help you lose weight. But even if it doesn't, being active is good for your health.   Avoid " "alcohol, especially in the evening. Alcohol can make sleep apnea worse.  How is sleep apnea treated? -- Treatment can include:   Weight loss - As mentioned above, weight loss can help if you have excess weight or obesity. But losing weight can be challenging, and it takes time to lose enough weight to help with your sleep apnea. Most people need other treatment while they work on losing weight.   CPAP - The most effective treatment for sleep apnea is a device that keeps your airway open while you sleep. Treatment with this device is called \"continuous positive airway pressure\" (\"CPAP\"). People getting CPAP wear a face mask at night that keeps them breathing (figure 2).  If your doctor or nurse recommends a CPAP machine, be patient about using it. The mask might seem uncomfortable to wear at first, and the machine might seem noisy, but using the machine can really help you. People with sleep apnea who use a CPAP machine feel more rested and generally feel better.  If CPAP does not work, your doctor might suggest other treatment. Options might include:   An oral device - This is a device that you wear in your mouth. It is called an \"oral appliance\" or \"mandibular advancement device.\" It helps keep your airway open while you sleep.   Hypoglossal nerve stimulation - This involves a procedure to implant a small device into your chest. The device has a wire that connects to the nerve under your tongue. While you are sleeping, it sends an electrical signal that causes the tongue to push forward. This helps open up your airway.   Surgery to widen your airway - This might involve removing your tonsils or other tissue that blocks the airway.  Is sleep apnea dangerous? -- It can be. Risks include:   Accidents - People with sleep apnea do not get good-quality sleep, so they are often tired and not alert. This puts them at risk for car accidents and other types of accidents.   Other health problems - Studies show that people " with sleep apnea are more likely than others to have high blood pressure, heart attacks, and other serious heart problems. Some people also have mood changes or depression.  In people with severe sleep apnea, getting treated (for example, with CPAP) can help lower these risks.  All topics are updated as new evidence becomes available and our peer review process is complete.  This topic retrieved from [employer] on: Feb 28, 2024.  Topic 67700 Version 18.0  Release: 32.2.4 - C32.58  © 2024 UpToDate, Inc. and/or its affiliates. All rights reserved.  figure 1: Airway in a person with sleep apnea     Normally, when a person sleeps, the airway remains open, and air can pass from the nose and mouth to the lungs. In a person with sleep apnea, parts of the throat and mouth drop into the airway and block off the flow of air. This can cause loud snoring and interrupt breathing for short periods.  Graphic 09321 Version 6.0  figure 2: Continuous positive airway pressure (CPAP) for sleep apnea     The CPAP mask gently blows air into your nose while you sleep. It puts just enough pressure on your airway to keep it from closing. The mask in this picture fits over just the nose. Other CPAP devices have masks that fit over the nose and mouth.  Graphic 04599 Version 5.0  Consumer Information Use and Disclaimer   Disclaimer: This generalized information is a limited summary of diagnosis, treatment, and/or medication information. It is not meant to be comprehensive and should be used as a tool to help the user understand and/or assess potential diagnostic and treatment options. It does NOT include all information about conditions, treatments, medications, side effects, or risks that may apply to a specific patient. It is not intended to be medical advice or a substitute for the medical advice, diagnosis, or treatment of a health care provider based on the health care provider's examination and assessment of a patient's specific and unique  circumstances. Patients must speak with a health care provider for complete information about their health, medical questions, and treatment options, including any risks or benefits regarding use of medications. This information does not endorse any treatments or medications as safe, effective, or approved for treating a specific patient. UpToDate, Inc. and its affiliates disclaim any warranty or liability relating to this information or the use thereof.The use of this information is governed by the Terms of Use, available at https://www.woltersRedbiotecuwer.com/en/know/clinical-effectiveness-terms. 2024© UpToDate, Inc. and its affiliates and/or licensors. All rights reserved.  Copyright   © 2024 UpToDate, Inc. and/or its affiliates. All rights reserved.

## 2024-12-16 NOTE — ASSESSMENT & PLAN NOTE
Hypertension - We reviewed the association between untreated obstructive sleep apnea and the increased risk for hypertension. Patient to continue on prescribed anti-hypertensive therapy and follow up with PCP for continuity of care.     Orders:    Home Study; Future

## 2025-01-08 ENCOUNTER — TELEPHONE (OUTPATIENT)
Dept: CARDIOLOGY CLINIC | Facility: CLINIC | Age: 80
End: 2025-01-08

## 2025-01-08 ENCOUNTER — OFFICE VISIT (OUTPATIENT)
Dept: CARDIOLOGY CLINIC | Facility: CLINIC | Age: 80
End: 2025-01-08
Payer: MEDICARE

## 2025-01-08 VITALS
BODY MASS INDEX: 23.56 KG/M2 | WEIGHT: 120 LBS | HEART RATE: 96 BPM | SYSTOLIC BLOOD PRESSURE: 152 MMHG | HEIGHT: 60 IN | TEMPERATURE: 97.7 F | DIASTOLIC BLOOD PRESSURE: 80 MMHG | OXYGEN SATURATION: 96 %

## 2025-01-08 DIAGNOSIS — Z01.818 PRE-OP EVALUATION: Primary | ICD-10-CM

## 2025-01-08 DIAGNOSIS — Z86.73 HISTORY OF CVA (CEREBROVASCULAR ACCIDENT): ICD-10-CM

## 2025-01-08 DIAGNOSIS — I65.23 CAROTID STENOSIS, ASYMPTOMATIC, BILATERAL: ICD-10-CM

## 2025-01-08 DIAGNOSIS — I10 PRIMARY HYPERTENSION: ICD-10-CM

## 2025-01-08 PROCEDURE — 99214 OFFICE O/P EST MOD 30 MIN: CPT | Performed by: INTERNAL MEDICINE

## 2025-01-08 NOTE — PROGRESS NOTES
[facility] [place]  [address]  [address]  [facility] [id number] [phone]    Patient Name: Agatha Villeda  YOB: 1945 ;female  MR No: [id number]        Diagnosis ICD-10-CM Associated Orders   1. Pre-op evaluation  Z01.818 POCT ECG      2. Primary hypertension  I10       3. History of CVA (cerebrovascular accident)  Z86.73       4. Carotid stenosis, asymptomatic, bilateral  I65.23            Assessment and recommendations:    1. Pre-op evaluation  Assessment & Plan:  Echocardiogram from 2024 showed normal left ventricular systolic function with mild aortic sclerosis and mild tricuspid regurgitation.  Based on current data, she is intermediate risk from cardiac standpoint primarily because of her age.  No additional cardiac testing is indicated for the upcoming hiatal hernia surgery.  She may hold the aspirin for 5 to 7 days prior to the procedure.  Orders:  -     POCT ECG  2. Primary hypertension  Assessment & Plan:  Patient reports that blood pressure generally is quite good at home.  Continue amlodipine at the present dose.  3. History of CVA (cerebrovascular accident)  Assessment & Plan:  So far there has not been any documentation of atrial fibrillation on her extended cardiac monitoring.  Most recent loop recorder interrogation from November 2024 showed brief SVT but no atrial fibrillation.  No indication for long-term oral anticoagulation.  She will continue single antiplatelet therapy as per neurology.  4. Carotid stenosis, asymptomatic, bilateral  Assessment & Plan:  She has already seen vascular surgery and her recent carotid duplex showed less than 50% bilateral carotid stenosis and it will be followed annually by vascular surgery. Her lipid profile is excellent and she is on  statins now.            CHIEF COMPLAINT:      Preop assessment    HPI:   79-year-old female presents for preop evaluation prior to surgery for hiatal  hernia.    Patient was recently seen in August 2024 after she presented to the hospital in May 2024 when she was on a cruise and suddenly developed left hand and right leg weakness.  She subsequently presented to the emergency room in [place] and was admitted with stroke.  MRI brain reported multifocal acute and subacute infarcts involving the left greater then right frontoparietal cortices and left cerebellum.  CTA neck showed 50% left carotid disease.  She had an extended 14-day ZIO monitor which showed no evidence of atrial fibrillation.  Subsequently she had a [employer]tr[employer] loop recorder implanted as well.  Patient reports that she has completely recovered from her stroke.  She lives independently and takes care of all of her home chores.  She denies any exertional chest pain, dyspnea on exertion, palpitations or syncope    Past Medical History:   Diagnosis Date    Carotid artery narrowings     Cataracts, bilateral     Stroke (HCC)           CURRENT  MEDICATIONS:      Current Outpatient Medications:     acetaminophen (TYLENOL) 325 mg tablet, Take 2 tablets (650 mg total) by mouth every 4 (four) hours as needed for mild pain, headaches or fever, Disp: 30 tablet, Rfl: 0    alendronate (Fosamax) 70 mg tablet, Take 1 tablet (70 mg total) by mouth every 7 days, Disp: 4 tablet, Rfl: 5    amLODIPine (NORVASC) 5 mg tablet, Take 1 tablet (5 mg total) by mouth daily, Disp: 90 tablet, Rfl: 3    aspirin 81 mg chewable tablet, Chew 1 tablet (81 mg total) daily, Disp: 30 tablet, Rfl: 0    atorvastatin (LIPITOR) 40 mg tablet, take 1 tablet by mouth every evening, Disp: 90 tablet, Rfl: 1    Calcium Carbonate-Vit D-Min (CALCIUM 1200 PO), Take by mouth, Disp: , Rfl:     folic acid (FOLVITE) 800 MCG tablet, Take 800 mcg by mouth daily, Disp: , Rfl:     Iron-Vitamin C (Vitron-C)  MG TABS, Take 1 tablet by mouth every other day, Disp: , Rfl:     pantoprazole (PROTONIX) 40 mg tablet, take 1 tablet by mouth daily IN THE EARLY  MORNING, Disp: 90 tablet, Rfl: 1    polyethylene glycol (MIRALAX) 17 g packet, Take 17 g by mouth daily as needed (Constipation), Disp: 10 each, Rfl: 0    vitamin B-12 (VITAMIN B-12) 1,000 mcg tablet, Take by mouth daily, Disp: , Rfl:     VITAMIN D PO, Take by mouth, Disp: , Rfl:     ALLERGIES  No Known Allergies    Lab Results   Component Value Date    LDLCALC 88 05/11/2024    HDL 49 (L) 05/11/2024    CHOLESTEROL 154 05/11/2024    TRIG 87 05/11/2024    CREATININE 0.82 10/21/2024    CREATININE 0.90 07/12/2024    K 3.8 10/21/2024    K 5.1 07/12/2024    SODIUM 138 10/21/2024    SODIUM 142 07/12/2024    TSH 1.42 06/14/2024       I have personally reviewed the most recent ECG from December 17, 2024 which showed normal sinus rhythm with no other abnormalities.      REVIEW OF SYSTEMS   Positive for: GERD  Negative for: All remaining as reviewed below and in HPI.    SYSTEM SYMPTOMS REVIEWED:  General--weight change, fever, night sweats  Respiratory--cough, wheezing, shortness of breath, sputum production  Cardiovascular--chest pain, syncope, dyspnea on exertion, edema, decline in exercise tolerance, claudication   Gastrointestinal--persistent vomiting, diarrhea, abdominal distention, blood in stool   Muscular or skeletal--joint pain or swelling   Neurologic--headaches, syncope, abnormal movement  Hematologic--history of easy bruising and bleeding   Endocrine--thyroid enlargement, heat or cold intolerance, polyuria   Psychiatric--anxiety, depression     General physical examination:    General appearance: Alert, no acute distress, appears stated age, normal weight.  HEENT: Mucous membranes are moist.  No obvious abnormality noted.  Neck: Supple with no lymphadenopathy.  No JVD.  Carotid pulses are intact.  No carotid bruit.  Cardiovascular system: Regular rhythm.  Normal S1 and S2.  Soft 1/6 systolic ejection murmur at the base.  No rubs or gallops. Extremities: No edema. No cyanosis.  Pulmonary: Respirations unlabored.   "Good air entry bilaterally.  Clear to auscultation bilaterally.  Gastrointestinal: Abdomen is soft and nontender.  Bowel sounds are positive.  Musculoskeletal: Upper Extremities: Normal upper motor strength. Lower Extremity: Normal motor strength. Gait: Normal.   Skin: Skin is warm. No rashes or lesions.  Neurological: Patient is alert and oriented with no gross motor deficits.  Psychiatric: Mood is normal.  Behavior is normal.    Vitals:    01/08/25 0942   BP: 152/80   Pulse: 96   Temp: 97.7 °F (36.5 °C)   SpO2: 96%      Body mass index is 23.44 kg/m².  Wt Readings from Last 3 Encounters:   01/08/25 54.4 kg (120 lb)   12/16/24 54.4 kg (120 lb)   12/03/24 54 kg (119 lb)             Jose Raul Rodriguez MD, FACC, JAVIER    Portions of the record  have been created with voice recognition software.  Occasional grammatical mistakes or wrong word or \"sound alike\" substitutions may have occurred due to the inherent limitations of voice recognition software. Please reach out to me directly for any clarifications.   "

## 2025-01-08 NOTE — ASSESSMENT & PLAN NOTE
She has already seen vascular surgery and her recent carotid duplex showed less than 50% bilateral carotid stenosis and it will be followed annually by vascular surgery. Her lipid profile is excellent and she is on  statins now.

## 2025-01-08 NOTE — ASSESSMENT & PLAN NOTE
So far there has not been any documentation of atrial fibrillation on her extended cardiac monitoring.  Most recent loop recorder interrogation from November 2024 showed brief SVT but no atrial fibrillation.  No indication for long-term oral anticoagulation.  She will continue single antiplatelet therapy as per neurology.

## 2025-01-08 NOTE — ASSESSMENT & PLAN NOTE
Patient reports that blood pressure generally is quite good at home.  Continue amlodipine at the present dose.

## 2025-01-08 NOTE — ASSESSMENT & PLAN NOTE
Echocardiogram from 2024 showed normal left ventricular systolic function with mild aortic sclerosis and mild tricuspid regurgitation.  Based on current data, she is intermediate risk from cardiac standpoint primarily because of her age.  No additional cardiac testing is indicated for the upcoming hiatal hernia surgery.  She may hold the aspirin for 5 to 7 days prior to the procedure.

## 2025-01-08 NOTE — TELEPHONE ENCOUNTER
Per pt office notes needed to be faxed to Vladin Obrodovic [phone].  Faxed and confirmed.  Also, needed to be faxed to pre surg [phone] faxed and confirmed.

## 2025-01-09 NOTE — TELEPHONE ENCOUNTER
Brenda from Dr. Bahena's office calls, she received clearance note but there was no indication if pt was cleared.  She was unsure if she got the final office note.  Reviewed note- it does indicate pt is cleared.  Re-faxed it to [phone].

## 2025-02-07 ENCOUNTER — RESULTS FOLLOW-UP (OUTPATIENT)
Dept: NON INVASIVE DIAGNOSTICS | Facility: HOSPITAL | Age: 80
End: 2025-02-07

## 2025-02-07 ENCOUNTER — REMOTE DEVICE CLINIC VISIT (OUTPATIENT)
Dept: CARDIOLOGY CLINIC | Facility: CLINIC | Age: 80
End: 2025-02-07
Payer: MEDICARE

## 2025-02-07 DIAGNOSIS — Z86.73 HISTORY OF CVA (CEREBROVASCULAR ACCIDENT): Primary | ICD-10-CM

## 2025-02-07 PROCEDURE — 93298 REM INTERROG DEV EVAL SCRMS: CPT | Performed by: STUDENT IN AN ORGANIZED HEALTH CARE EDUCATION/TRAINING PROGRAM

## 2025-02-07 NOTE — PROGRESS NOTES
"MDT LNQ22 ILR/ ACTIVE SYSTEM IS MRI CONDITIONAL   CARELINK TRANSMISSION: LOOP RECORDER. PRESENTING RHYTHM NSR W/ PACs . BATTERY STATUS \"OK.\" NO PATIENT OR DEVICE ACTIVATED EPISODES. NORMAL DEVICE FUNCTION. DL   "

## 2025-02-14 ENCOUNTER — APPOINTMENT (OUTPATIENT)
Dept: LAB | Facility: CLINIC | Age: 80
End: 2025-02-14
Payer: MEDICARE

## 2025-02-14 DIAGNOSIS — E53.8 FOLATE DEFICIENCY: ICD-10-CM

## 2025-02-14 DIAGNOSIS — D50.0 ANEMIA DUE TO GI BLOOD LOSS: ICD-10-CM

## 2025-02-14 DIAGNOSIS — D50.0 IRON DEFICIENCY ANEMIA DUE TO CHRONIC BLOOD LOSS: ICD-10-CM

## 2025-02-14 DIAGNOSIS — E53.8 VITAMIN B 12 DEFICIENCY: ICD-10-CM

## 2025-02-14 LAB
BASOPHILS # BLD AUTO: 0.07 THOUSANDS/ÂΜL (ref 0–0.1)
BASOPHILS NFR BLD AUTO: 0 % (ref 0–1)
CRP SERPL QL: 12.4 MG/L
EOSINOPHIL # BLD AUTO: 0.23 THOUSAND/ÂΜL (ref 0–0.61)
EOSINOPHIL NFR BLD AUTO: 2 % (ref 0–6)
ERYTHROCYTE [DISTWIDTH] IN BLOOD BY AUTOMATED COUNT: 12.8 % (ref 11.6–15.1)
ERYTHROCYTE [SEDIMENTATION RATE] IN BLOOD: 37 MM/HOUR (ref 0–29)
FERRITIN SERPL-MCNC: 53 NG/ML (ref 11–307)
FOLATE SERPL-MCNC: >22.3 NG/ML
HCT VFR BLD AUTO: 40.1 % (ref 34.8–46.1)
HGB BLD-MCNC: 12.4 G/DL (ref 11.5–15.4)
IMM GRANULOCYTES # BLD AUTO: 0.03 THOUSAND/UL (ref 0–0.2)
IMM GRANULOCYTES NFR BLD AUTO: 0 % (ref 0–2)
IRON SATN MFR SERPL: 11 % (ref 15–50)
IRON SERPL-MCNC: 38 UG/DL (ref 50–212)
LYMPHOCYTES # BLD AUTO: 1.18 THOUSANDS/ÂΜL (ref 0.6–4.47)
LYMPHOCYTES NFR BLD AUTO: 7 % (ref 14–44)
MCH RBC QN AUTO: 30.8 PG (ref 26.8–34.3)
MCHC RBC AUTO-ENTMCNC: 30.9 G/DL (ref 31.4–37.4)
MCV RBC AUTO: 100 FL (ref 82–98)
MONOCYTES # BLD AUTO: 1.07 THOUSAND/ÂΜL (ref 0.17–1.22)
MONOCYTES NFR BLD AUTO: 7 % (ref 4–12)
NEUTROPHILS # BLD AUTO: 13.28 THOUSANDS/ÂΜL (ref 1.85–7.62)
NEUTS SEG NFR BLD AUTO: 84 % (ref 43–75)
NRBC BLD AUTO-RTO: 0 /100 WBCS
PLATELET # BLD AUTO: 407 THOUSANDS/UL (ref 149–390)
PMV BLD AUTO: 11.3 FL (ref 8.9–12.7)
RBC # BLD AUTO: 4.02 MILLION/UL (ref 3.81–5.12)
TIBC SERPL-MCNC: 359.8 UG/DL (ref 250–450)
TRANSFERRIN SERPL-MCNC: 257 MG/DL (ref 203–362)
UIBC SERPL-MCNC: 322 UG/DL (ref 155–355)
VIT B12 SERPL-MCNC: 1177 PG/ML (ref 180–914)
WBC # BLD AUTO: 15.86 THOUSAND/UL (ref 4.31–10.16)

## 2025-02-14 PROCEDURE — 85652 RBC SED RATE AUTOMATED: CPT

## 2025-02-14 PROCEDURE — 83540 ASSAY OF IRON: CPT

## 2025-02-14 PROCEDURE — 36415 COLL VENOUS BLD VENIPUNCTURE: CPT

## 2025-02-14 PROCEDURE — 82746 ASSAY OF FOLIC ACID SERUM: CPT

## 2025-02-14 PROCEDURE — 82728 ASSAY OF FERRITIN: CPT

## 2025-02-14 PROCEDURE — 82607 VITAMIN B-12: CPT

## 2025-02-14 PROCEDURE — 83550 IRON BINDING TEST: CPT

## 2025-02-14 PROCEDURE — 85025 COMPLETE CBC W/AUTO DIFF WBC: CPT

## 2025-02-14 PROCEDURE — 86140 C-REACTIVE PROTEIN: CPT

## 2025-02-21 ENCOUNTER — HOSPITAL ENCOUNTER (OUTPATIENT)
Dept: SLEEP CENTER | Facility: CLINIC | Age: 80
Discharge: HOME | End: 2025-02-21
Payer: MEDICARE

## 2025-02-21 DIAGNOSIS — R06.83 SNORING: ICD-10-CM

## 2025-02-21 DIAGNOSIS — I10 PRIMARY HYPERTENSION: ICD-10-CM

## 2025-02-21 DIAGNOSIS — K21.9 GASTROESOPHAGEAL REFLUX DISEASE, UNSPECIFIED WHETHER ESOPHAGITIS PRESENT: ICD-10-CM

## 2025-02-21 DIAGNOSIS — I63.9 STROKE (HCC): ICD-10-CM

## 2025-02-21 DIAGNOSIS — G47.8 OTHER SLEEP DISORDERS: ICD-10-CM

## 2025-02-21 DIAGNOSIS — R35.1 NOCTURIA: ICD-10-CM

## 2025-02-21 PROCEDURE — G0399 HOME SLEEP TEST/TYPE 3 PORTA: HCPCS

## 2025-02-22 NOTE — PROGRESS NOTES
Home Sleep Study Documentation    HOME STUDY DEVICE: Noxturnal no                                           Dara G3 yes      Pre-Sleep Home Study:    Set-up and instructions performed by: WAYNE Alba, [facility]    Technician performed demonstration for Patient: yes    Return demonstration performed by Patient: yes    Written instructions provided to Patient: yes    Patient signed consent form: yes        Post-Sleep Home Study:    Additional comments by Patient: None    Home Sleep Study Failed:no:    Failure reason: N/A    Reported or Detected: N/A    Scored by:WAYNE Villela

## 2025-02-25 ENCOUNTER — OFFICE VISIT (OUTPATIENT)
Dept: HEMATOLOGY ONCOLOGY | Facility: CLINIC | Age: 80
End: 2025-02-25
Payer: MEDICARE

## 2025-02-25 VITALS
RESPIRATION RATE: 16 BRPM | BODY MASS INDEX: 22.38 KG/M2 | SYSTOLIC BLOOD PRESSURE: 130 MMHG | HEART RATE: 66 BPM | OXYGEN SATURATION: 99 % | TEMPERATURE: 97.9 F | DIASTOLIC BLOOD PRESSURE: 68 MMHG | WEIGHT: 114 LBS | HEIGHT: 60 IN

## 2025-02-25 DIAGNOSIS — D72.828 NEUTROPHILIA: Primary | ICD-10-CM

## 2025-02-25 DIAGNOSIS — D75.839 THROMBOCYTOSIS: ICD-10-CM

## 2025-02-25 DIAGNOSIS — D50.9 IRON DEFICIENCY ANEMIA, UNSPECIFIED IRON DEFICIENCY ANEMIA TYPE: ICD-10-CM

## 2025-02-25 PROCEDURE — G2211 COMPLEX E/M VISIT ADD ON: HCPCS | Performed by: PHYSICIAN ASSISTANT

## 2025-02-25 PROCEDURE — 99214 OFFICE O/P EST MOD 30 MIN: CPT | Performed by: PHYSICIAN ASSISTANT

## 2025-02-25 NOTE — ASSESSMENT & PLAN NOTE
Lab Results   Component Value Date     (H) 02/14/2025   Patient has very mild elevated platelets.  This may be an indication of iron deficiency with a iron saturation of approximately 10%.  With ferritin at 53 and inflammation seeming higher than before, it is possible that the ferritin level is truly just falsely elevated.    Orders:    CBC and differential; Future    Basic metabolic panel; Future    C-reactive protein; Future    Sedimentation rate, automated; Future

## 2025-02-25 NOTE — PATIENT INSTRUCTIONS
[facility] Medical Oncology and Hematology Team  [place] - [phone]    Your Team Member:  Advanced Practitioner:  Amy Montgomery PA-C    Please answer Private and Unavailable Calls - this may be your team(s) contacting you.  If you have medical questions/concerns/issues - contact us either by (1) My Chart (2) [facility]     Please go for bloodwork in the next two weeks.  Ok to go March 10th.

## 2025-02-25 NOTE — PROGRESS NOTES
Name: Agatha Villeda      : 1945      MRN: [id number]  Encounter Provider: Amy Montgomery PA-C  Encounter Date: 2025   Encounter department: [facility]  :  Assessment & Plan  Neutrophilia  Lab Results   Component Value Date    WBC 15.86 (H) 2025     Lab Results   Component Value Date    NEUTROABS 13.28 (H) 2025     This is a 79-year-old male with past medical history of mild neutrophilia.  Patient's lab tests have demonstrated a mild increase in neutrophils since last visit, however, the patient just had a surgical repair 3 weeks prior to blood work.  I have recommended that the patient go for additional blood work in another 2 weeks, 6 weeks from surgical intervention to see if this has an effect on neutrophil count as well as platelet count for inflammatory causation.  C-reactive protein and sed rate was requested.    If patient's laboratory assessment does not improve subsequent workup may be necessary to determine etiology of elevated white blood cell count.    Patient and her sister were in agreement to go for blood work in approximately 2 weeks around 3/11/2025.  They will reach out to me if they do not hear from me.  New orders will need to be placed for follow-up in May.    Orders:    CBC and differential; Future    Basic metabolic panel; Future    C-reactive protein; Future    Sedimentation rate, automated; Future    Thrombocytosis  Lab Results   Component Value Date     (H) 2025   Patient has very mild elevated platelets.  This may be an indication of iron deficiency with a iron saturation of approximately 10%.  With ferritin at 53 and inflammation seeming higher than before, it is possible that the ferritin level is truly just falsely elevated.    Orders:    CBC and differential; Future    Basic metabolic panel; Future    C-reactive protein; Future    Sedimentation rate, automated; Future    Iron deficiency anemia,  unspecified iron deficiency anemia type  Lab Results   Component Value Date    IRON 38 (L) 02/14/2025    TIBC 359.8 02/14/2025    FERRITIN 53 02/14/2025     This is a 79-year-old female with prior history of iron deficiency anemia patient recently had a large hiatal hernia which was recently repaired.  Inflammation could be related to the repair.  Since this was done 3 weeks ago blood work completed last week.    Regimen:  Vitron-C daily.    Orders:    CBC and differential; Future    Basic metabolic panel; Future    C-reactive protein; Future    Sedimentation rate, automated; Future      Return in about 3 months (around 5/25/2025) for Labs, [facility] Office Visit.    History of Present Illness   Chief Complaint   Patient presents with    Follow-up     Pertinent Medical History   This is a 79-year-old female with past medical history of CVA in May 2024, profound anemia in May 2024 with carotid artery narrowings and bilateral cataracts who presents to hematology for evaluation of thrombocytosis.     Prior to May 2024 patient enjoyed excellent health, did not follow-up with a physician routinely due to lack of medical concerns.  Patient active living at home, has 2 sisters 181 years of age and another 1 slightly younger.  Patient has good social support.      Hospitalization, patient had changes to bowels, melena noted upon review today.     In May 2024 patient presented to the emergency room with right leg weakness that began 3 days prior.  MRI revealed multifocal acute/subacute infarcts involving bilateral frontal parietal and occipital lobes.  On admission patient's hemoglobin was noted to be 6.7 and after hydration dropped to 5.8.  Patient received 2 packed red blood cells along with Venofer 400 mg-total without side effects.              5/10/2024 ferritin 19, iron saturation 2%, TIBC 682                          WBC 9.6, hemoglobin 6.7, MCV 70, RDW 17.8, platelet count 779  EDG during admission found   large type III hiatal hernia with moderate edematous, scarred, ulcerated mucosa with erosion in the esophagus.  Colonoscopy was benign.     6/14/2024 WBC 10.7, hemoglobin 11.6, MCV 80, RDW 30.5, platelet count 272        07/09/24: Patient notes no residual issues with CVA.  Patient underwent rehab and is feeling quite well.  Patient does admit to melena in the past however no melena since discharge from hospital.  Patient is tolerating Sucarafate without significant toxicity.  Patient has not been taking oral iron outpatient, never advised likely secondary to sick Carafate and upper GI disturbances.     7/12/2024 WBC 8.5, hemoglobin 12.7, platelet count 336, neutrophil 6.5              Ferritin 71, B12 361, folate 8                 Recommendation: B12 3000 gummy daily + folic acid 800     10/21/2024: WBC 10.2, hemoglobin 13.3, MCV 98, platelet count 361, ANC 8.4, other differential WNL  ferritin = 36, iron saturation 20%, TIBC 381              B12 1554, greater than 22.3              TSH 1.18    10/31/2024 large herniation about the GE junction and stomach causing a degree of torsion.  Patchy erosions inside the hiatal hernia esophageal stomach duodenal bulb first part of duodenum normal.    1/22 through 1/24/2025 admission for hiatal hernia repair.    2/14/2025 WBC 15.8, hemoglobin 12.4, , platelet count 407    02/25/25: Patient felt well after surgery.  Generally feeling well no side effects of taking oral iron Vitron-C daily.     Review of Systems   Constitutional:  Positive for fatigue. Negative for appetite change, fever and unexpected weight change.   HENT:  Negative for nosebleeds.    Respiratory:  Negative for cough, choking and shortness of breath.         Negative hemoptysis.   Cardiovascular:  Negative for chest pain, palpitations and leg swelling.   Gastrointestinal: Negative.  Negative for abdominal distention, abdominal pain, anal bleeding, blood in stool, constipation, diarrhea, nausea and  vomiting.   Endocrine: Negative.  Negative for cold intolerance.   Genitourinary: Negative.  Negative for hematuria, menstrual problem, vaginal bleeding, vaginal discharge and vaginal pain.   Musculoskeletal: Negative.  Negative for arthralgias, myalgias, neck pain and neck stiffness.   Skin: Negative.  Negative for color change, pallor and rash.   Allergic/Immunologic: Negative.  Negative for immunocompromised state.   Neurological: Negative.  Negative for weakness and headaches.   Hematological:  Negative for adenopathy. Does not bruise/bleed easily.   All other systems reviewed and are negative.    Medical History Reviewed by provider this encounter:     .  Current Outpatient Medications on File Prior to Visit   Medication Sig Dispense Refill    acetaminophen (TYLENOL) 325 mg tablet Take 2 tablets (650 mg total) by mouth every 4 (four) hours as needed for mild pain, headaches or fever 30 tablet 0    alendronate (Fosamax) 70 mg tablet Take 1 tablet (70 mg total) by mouth every 7 days 4 tablet 5    amLODIPine (NORVASC) 5 mg tablet Take 1 tablet (5 mg total) by mouth daily 90 tablet 3    aspirin 81 mg chewable tablet Chew 1 tablet (81 mg total) daily 30 tablet 0    atorvastatin (LIPITOR) 40 mg tablet take 1 tablet by mouth every evening 90 tablet 1    Calcium Carbonate-Vit D-Min (CALCIUM 1200 PO) Take by mouth      folic acid (FOLVITE) 800 MCG tablet Take 800 mcg by mouth daily      Iron-Vitamin C (Vitron-C)  MG TABS Take 1 tablet by mouth every other day      pantoprazole (PROTONIX) 40 mg tablet take 1 tablet by mouth daily IN THE EARLY MORNING 90 tablet 1    polyethylene glycol (MIRALAX) 17 g packet Take 17 g by mouth daily as needed (Constipation) 10 each 0    vitamin B-12 (VITAMIN B-12) 1,000 mcg tablet Take by mouth daily      VITAMIN D PO Take by mouth       No current facility-administered medications on file prior to visit.      Social History     Tobacco Use    Smoking status: Never    Smokeless  tobacco: Never   Vaping Use    Vaping status: Never Used   Substance and Sexual Activity    Alcohol use: Not Currently     Comment: rarely    Drug use: Never    Sexual activity: Not on file         Objective   /68 (Patient Position: Sitting, Cuff Size: Adult)   Pulse 66   Temp 97.9 °F (36.6 °C) (Temporal)   Resp 16   Ht 5' (1.524 m)   Wt 51.7 kg (114 lb)   SpO2 99%   BMI 22.26 kg/m²     Physical Exam  Constitutional:       General: She is not in acute distress.     Appearance: She is well-developed.   HENT:      Head: Normocephalic and atraumatic.   Eyes:      General: No scleral icterus.     Conjunctiva/sclera: Conjunctivae normal.   Cardiovascular:      Rate and Rhythm: Normal rate.   Pulmonary:      Effort: Pulmonary effort is normal. No respiratory distress.   Skin:     General: Skin is warm.      Coloration: Skin is not pale.      Findings: No rash.   Neurological:      Mental Status: She is alert and oriented to person, place, and time.   Psychiatric:         Thought Content: Thought content normal.         Labs: I have reviewed the following labs:  Results for orders placed or performed in visit on 02/14/25   CBC and differential   Result Value Ref Range    WBC 15.86 (H) 4.31 - 10.16 Thousand/uL    RBC 4.02 3.81 - 5.12 Million/uL    Hemoglobin 12.4 11.5 - 15.4 g/dL    Hematocrit 40.1 34.8 - 46.1 %     (H) 82 - 98 fL    MCH 30.8 26.8 - 34.3 pg    MCHC 30.9 (L) 31.4 - 37.4 g/dL    RDW 12.8 11.6 - 15.1 %    MPV 11.3 8.9 - 12.7 fL    Platelets 407 (H) 149 - 390 Thousands/uL    nRBC 0 /100 WBCs    Segmented % 84 (H) 43 - 75 %    Immature Grans % 0 0 - 2 %    Lymphocytes % 7 (L) 14 - 44 %    Monocytes % 7 4 - 12 %    Eosinophils Relative 2 0 - 6 %    Basophils Relative 0 0 - 1 %    Absolute Neutrophils 13.28 (H) 1.85 - 7.62 Thousands/µL    Absolute Immature Grans 0.03 0.00 - 0.20 Thousand/uL    Absolute Lymphocytes 1.18 0.60 - 4.47 Thousands/µL    Absolute Monocytes 1.07 0.17 - 1.22 Thousand/µL     Eosinophils Absolute 0.23 0.00 - 0.61 Thousand/µL    Basophils Absolute 0.07 0.00 - 0.10 Thousands/µL   Vitamin B12   Result Value Ref Range    Vitamin B-12 1,177 (H) 180 - 914 pg/mL   Result Value Ref Range    Folate >22.3 >5.9 ng/mL   Sedimentation rate, automated   Result Value Ref Range    Sed Rate 37 (H) 0 - 29 mm/hour   Result Value Ref Range    CRP 12.4 (H) <3.0 mg/L   TIBC Panel (incl. Iron, TIBC, % Iron Saturation)   Result Value Ref Range    Iron Saturation 11 (L) 15 - 50 %    TIBC 359.8 250 - 450 ug/dL    Iron 38 (L) 50 - 212 ug/dL    Transferrin 257 203 - 362 mg/dL    UIBC 322 155 - 355 ug/dL   Result Value Ref Range    Ferritin 53 11 - 307 ng/mL     Administrative Statements   I have spent a total time of 35 minutes in caring for this patient on the day of the visit/encounter including Instructions for management, Patient and family education, and Obtaining or reviewing history  .

## 2025-02-26 ENCOUNTER — DOCUMENTATION (OUTPATIENT)
Dept: SLEEP CENTER | Facility: CLINIC | Age: 80
End: 2025-02-26

## 2025-02-26 PROBLEM — G47.33 OSA (OBSTRUCTIVE SLEEP APNEA): Status: ACTIVE | Noted: 2025-02-26

## 2025-02-26 PROBLEM — R35.1 NOCTURIA: Status: ACTIVE | Noted: 2025-02-26

## 2025-02-26 PROCEDURE — 95806 SLEEP STUDY UNATT&RESP EFFT: CPT | Performed by: STUDENT IN AN ORGANIZED HEALTH CARE EDUCATION/TRAINING PROGRAM

## 2025-02-26 NOTE — PROGRESS NOTES
Patient recently completed a home sleep study.    Home sleep study revealed moderate obstructive sleep apnea with an SANTANA of 18.8 events per hour.    I have messaged clinical sleep will to contact patient regarding sleep study results and schedule follow-up with our practice.

## 2025-03-06 ENCOUNTER — TELEPHONE (OUTPATIENT)
Dept: SLEEP CENTER | Facility: CLINIC | Age: 80
End: 2025-03-06

## 2025-03-06 NOTE — TELEPHONE ENCOUNTER
Per Dr. Larios:  Patient recently completed a home sleep study.     Home sleep study revealed moderate obstructive sleep apnea with an SANTANA of 18.8 events per hour.     I have messaged clinical sleep will to contact patient regarding sleep study results and schedule follow-up with our practice.     ----------------------------------------------------    Called patient and advised of sleep study results. Offered to schedule follow up appointment with Dr. Larios but patient declined.  She wants to first meet with her neurologist on 3/25/25 who recommended that she have a sleep study.  She will call back after that appointment to schedule.  Provided patient with phone number [phone] option 1 then option 3.

## 2025-03-12 ENCOUNTER — APPOINTMENT (OUTPATIENT)
Dept: LAB | Facility: CLINIC | Age: 80
End: 2025-03-12
Payer: MEDICARE

## 2025-03-12 DIAGNOSIS — D75.839 THROMBOCYTOSIS: ICD-10-CM

## 2025-03-12 DIAGNOSIS — D72.828 NEUTROPHILIA: ICD-10-CM

## 2025-03-12 DIAGNOSIS — D50.9 IRON DEFICIENCY ANEMIA, UNSPECIFIED IRON DEFICIENCY ANEMIA TYPE: ICD-10-CM

## 2025-03-12 LAB
ANION GAP SERPL CALCULATED.3IONS-SCNC: 11 MMOL/L (ref 4–13)
BASOPHILS # BLD AUTO: 0.07 THOUSANDS/ÂΜL (ref 0–0.1)
BASOPHILS NFR BLD AUTO: 1 % (ref 0–1)
BUN SERPL-MCNC: 15 MG/DL (ref 5–25)
CALCIUM SERPL-MCNC: 10 MG/DL (ref 8.4–10.2)
CHLORIDE SERPL-SCNC: 100 MMOL/L (ref 96–108)
CO2 SERPL-SCNC: 28 MMOL/L (ref 21–32)
CREAT SERPL-MCNC: 0.8 MG/DL (ref 0.6–1.3)
CRP SERPL QL: 5.6 MG/L
EOSINOPHIL # BLD AUTO: 0.36 THOUSAND/ÂΜL (ref 0–0.61)
EOSINOPHIL NFR BLD AUTO: 3 % (ref 0–6)
ERYTHROCYTE [DISTWIDTH] IN BLOOD BY AUTOMATED COUNT: 12.6 % (ref 11.6–15.1)
ERYTHROCYTE [SEDIMENTATION RATE] IN BLOOD: 35 MM/HOUR (ref 0–29)
GFR SERPL CREATININE-BSD FRML MDRD: 70 ML/MIN/1.73SQ M
GLUCOSE P FAST SERPL-MCNC: 107 MG/DL (ref 65–99)
HCT VFR BLD AUTO: 40.4 % (ref 34.8–46.1)
HGB BLD-MCNC: 12.5 G/DL (ref 11.5–15.4)
IMM GRANULOCYTES # BLD AUTO: 0.03 THOUSAND/UL (ref 0–0.2)
IMM GRANULOCYTES NFR BLD AUTO: 0 % (ref 0–2)
LYMPHOCYTES # BLD AUTO: 1.31 THOUSANDS/ÂΜL (ref 0.6–4.47)
LYMPHOCYTES NFR BLD AUTO: 12 % (ref 14–44)
MCH RBC QN AUTO: 30.9 PG (ref 26.8–34.3)
MCHC RBC AUTO-ENTMCNC: 30.9 G/DL (ref 31.4–37.4)
MCV RBC AUTO: 100 FL (ref 82–98)
MONOCYTES # BLD AUTO: 0.79 THOUSAND/ÂΜL (ref 0.17–1.22)
MONOCYTES NFR BLD AUTO: 8 % (ref 4–12)
NEUTROPHILS # BLD AUTO: 8.01 THOUSANDS/ÂΜL (ref 1.85–7.62)
NEUTS SEG NFR BLD AUTO: 76 % (ref 43–75)
NRBC BLD AUTO-RTO: 0 /100 WBCS
PLATELET # BLD AUTO: 452 THOUSANDS/UL (ref 149–390)
PMV BLD AUTO: 10.3 FL (ref 8.9–12.7)
POTASSIUM SERPL-SCNC: 4.3 MMOL/L (ref 3.5–5.3)
RBC # BLD AUTO: 4.05 MILLION/UL (ref 3.81–5.12)
SODIUM SERPL-SCNC: 139 MMOL/L (ref 135–147)
WBC # BLD AUTO: 10.57 THOUSAND/UL (ref 4.31–10.16)

## 2025-03-12 PROCEDURE — 86140 C-REACTIVE PROTEIN: CPT

## 2025-03-12 PROCEDURE — 36415 COLL VENOUS BLD VENIPUNCTURE: CPT

## 2025-03-12 PROCEDURE — 80048 BASIC METABOLIC PNL TOTAL CA: CPT

## 2025-03-12 PROCEDURE — 85652 RBC SED RATE AUTOMATED: CPT

## 2025-03-12 PROCEDURE — 85025 COMPLETE CBC W/AUTO DIFF WBC: CPT

## 2025-03-14 ENCOUNTER — RESULTS FOLLOW-UP (OUTPATIENT)
Dept: HEMATOLOGY ONCOLOGY | Facility: CLINIC | Age: 80
End: 2025-03-14

## 2025-03-14 DIAGNOSIS — D75.839 THROMBOCYTOSIS: ICD-10-CM

## 2025-03-14 DIAGNOSIS — D72.828 NEUTROPHILIA: Primary | ICD-10-CM

## 2025-03-14 DIAGNOSIS — D53.9 NUTRITIONAL ANEMIA, UNSPECIFIED: ICD-10-CM

## 2025-03-25 ENCOUNTER — OFFICE VISIT (OUTPATIENT)
Dept: NEUROLOGY | Facility: CLINIC | Age: 80
End: 2025-03-25
Payer: MEDICARE

## 2025-03-25 VITALS
RESPIRATION RATE: 16 BRPM | DIASTOLIC BLOOD PRESSURE: 70 MMHG | OXYGEN SATURATION: 99 % | BODY MASS INDEX: 22.65 KG/M2 | SYSTOLIC BLOOD PRESSURE: 128 MMHG | WEIGHT: 115.4 LBS | TEMPERATURE: 97.6 F | HEIGHT: 60 IN | HEART RATE: 90 BPM

## 2025-03-25 DIAGNOSIS — G47.33 OBSTRUCTIVE SLEEP APNEA: ICD-10-CM

## 2025-03-25 DIAGNOSIS — I63.9 CEREBROVASCULAR ACCIDENT (CVA) (HCC): ICD-10-CM

## 2025-03-25 DIAGNOSIS — I63.9 CVA (CEREBRAL VASCULAR ACCIDENT) (HCC): Primary | ICD-10-CM

## 2025-03-25 PROCEDURE — G2211 COMPLEX E/M VISIT ADD ON: HCPCS | Performed by: PSYCHIATRY & NEUROLOGY

## 2025-03-25 PROCEDURE — 99215 OFFICE O/P EST HI 40 MIN: CPT | Performed by: PSYCHIATRY & NEUROLOGY

## 2025-03-25 NOTE — PROGRESS NOTES
"Name: Agatha Villeda      : 1945      MRN: [id number]  Encounter Provider: Aishwarya Stephens MD  Encounter Date: 3/25/2025   Encounter department: [facility] [place]  :  Assessment & Plan  CVA (cerebral vascular accident) (HCC)  Secondary Prevention -   -for medications - recommend continuation of combination of aspirin and atorvastatin  -Blood Pressure goal < 130/80, BP is at goal currently  -LDL goal <70, last LDL is at goal   -sleep risk factors - does show moderate sleep apnea on testing and is awaiting to see to discuss options.   -has a loop recorder, no afib detected so far       Counseling/stroke education -   -I advised patient to avoid using NSAIDs for headaches or other pain and to stick to tylenol if needed  -Recommend lifestyle modifications such as mediterranean diet & regular exercise regimen atleast 4-5 times a week for 20-30 minutes.   -I educated patient/family regarding medication compliance  -encourage control of diabetes and hypertension; defer management to primary        Obstructive sleep apnea  does show moderate sleep apnea on testing and is awaiting to see to discuss options.        Cerebrovascular accident (CVA) (HCC)         Follow up in 6 months     I would be happy to see the patient sooner if any new questions/concerns arise.  Patient/Guardian was advised to the call the office if they have any questions and concerns in the meantime.     We discussed \"red flag\" headache symptoms including symptoms such as facial droop on one side, weakness/paralysis on either side, speech trouble, numbness on one side, balance issues, any vision changes, extreme dizziness or significant increase in severity of headache or a sudden onset severe headache, patient is to call  immediately or to proceed to the nearest ER.         History of Present Illness   HPI     This is a 78 y/o Female who is here as a follow up for history of stroke.     Patient is doing well " and denies any new TIA/CVA like symptoms. Patient is complaint w/ medications, and is tolerating them well. She feels great overall. She did see sleep specialist and is awaiting options and wants to consider mask, and other options.     Patient has no residual deficits from the stroke. She has a loop recorder, and so far no afib has been detected so far. She otherwise is healthy, and exercises regularly.     Review of Systems   Constitutional: Negative.  Negative for chills and fever.   HENT: Negative.  Negative for ear pain and sore throat.    Eyes: Negative.  Negative for pain and visual disturbance.   Respiratory: Negative.  Negative for cough and shortness of breath.    Cardiovascular: Negative.  Negative for chest pain and palpitations.   Gastrointestinal: Negative.  Negative for abdominal pain and vomiting.   Endocrine: Negative.    Genitourinary: Negative.  Negative for dysuria and hematuria.   Musculoskeletal: Negative.  Negative for arthralgias and back pain.   Skin: Negative.  Negative for color change and rash.   Allergic/Immunologic: Negative.    Neurological: Negative.  Negative for seizures and syncope.   Hematological: Negative.    Psychiatric/Behavioral: Negative.     All other systems reviewed and are negative.   I have personally reviewed the MA's review of systems and made changes as necessary.         Objective   /70 (BP Location: Right arm, Patient Position: Sitting, Cuff Size: Adult)   Pulse 90   Temp 97.6 °F (36.4 °C) (Temporal)   Resp 16   Ht 5' (1.524 m)   Wt 52.3 kg (115 lb 6.4 oz)   SpO2 99%   BMI 22.54 kg/m²     Physical Exam  General - patient is alert, awake follows commands   Speech - no dysarthria noted, no aphasia noted.     Neuro:   Cranial nerves: PERRL, EOMI, facial sensation intact to soft touch in V1, V2 and V3, no facial asymmetry noted, uvula/palate midline, tongue midline.   Motor: 5/5 throughout, normal tone, no pronator drift noted.   Sensory - intact to soft  touch throughout  Reflexes - 2+ throughout  Coordination - no ataxia/dysmetria noted  Gait - normal    Neurological Exam        Administrative Statements   I have spent a total time of 40 minutes in caring for this patient on the day of the visit/encounter including Risks and benefits of tx options, Instructions for management, Patient and family education, Counseling / Coordination of care, Documenting in the medical record, Reviewing/placing orders in the medical record (including tests, medications, and/or procedures), Obtaining or reviewing history  , and Communicating with other healthcare professionals .

## 2025-03-31 ENCOUNTER — RESULTS FOLLOW-UP (OUTPATIENT)
Dept: CARDIOLOGY CLINIC | Facility: CLINIC | Age: 80
End: 2025-03-31

## 2025-04-06 DIAGNOSIS — M81.0 OSTEOPOROSIS WITHOUT CURRENT PATHOLOGICAL FRACTURE, UNSPECIFIED OSTEOPOROSIS TYPE: ICD-10-CM

## 2025-04-08 RX ORDER — ALENDRONATE SODIUM 70 MG/1
TABLET ORAL
Qty: 4 TABLET | Refills: 5 | Status: SHIPPED | OUTPATIENT
Start: 2025-04-08

## 2025-04-15 ENCOUNTER — TELEPHONE (OUTPATIENT)
Age: 80
End: 2025-04-15

## 2025-04-15 NOTE — TELEPHONE ENCOUNTER
Patient called in regards to having a UTI. Patient stated that she has been experiencing urinating frequently and burning while urinating. Patient would like to know if provider will be able to send in medication.

## 2025-04-15 NOTE — TELEPHONE ENCOUNTER
I called and left a message for patient to inform her that Dr Garcia is leaving this afternoon for a family emergency and we will not have any appointment opening in the office until Monday, Dr Garcia suggest for patient to be seen at an urgent care for her symptoms.

## 2025-04-24 ENCOUNTER — OFFICE VISIT (OUTPATIENT)
Dept: URGENT CARE | Facility: CLINIC | Age: 80
End: 2025-04-24
Payer: MEDICARE

## 2025-04-24 VITALS
RESPIRATION RATE: 18 BRPM | DIASTOLIC BLOOD PRESSURE: 66 MMHG | HEART RATE: 105 BPM | TEMPERATURE: 98.1 F | HEIGHT: 60 IN | OXYGEN SATURATION: 97 % | SYSTOLIC BLOOD PRESSURE: 140 MMHG | WEIGHT: 111.4 LBS | BODY MASS INDEX: 21.87 KG/M2

## 2025-04-24 DIAGNOSIS — N30.00 ACUTE CYSTITIS WITHOUT HEMATURIA: Primary | ICD-10-CM

## 2025-04-24 LAB
SL AMB  POCT GLUCOSE, UA: ABNORMAL
SL AMB LEUKOCYTE ESTERASE,UA: ABNORMAL
SL AMB POCT BILIRUBIN,UA: ABNORMAL
SL AMB POCT BLOOD,UA: ABNORMAL
SL AMB POCT CLARITY,UA: CLEAR
SL AMB POCT COLOR,UA: YELLOW
SL AMB POCT KETONES,UA: ABNORMAL
SL AMB POCT NITRITE,UA: ABNORMAL
SL AMB POCT PH,UA: 5
SL AMB POCT SPECIFIC GRAVITY,UA: 1.01
SL AMB POCT URINE PROTEIN: ABNORMAL
SL AMB POCT UROBILINOGEN: 0.2

## 2025-04-24 PROCEDURE — 99213 OFFICE O/P EST LOW 20 MIN: CPT

## 2025-04-24 PROCEDURE — G0463 HOSPITAL OUTPT CLINIC VISIT: HCPCS

## 2025-04-24 PROCEDURE — 87086 URINE CULTURE/COLONY COUNT: CPT

## 2025-04-24 PROCEDURE — 81002 URINALYSIS NONAUTO W/O SCOPE: CPT

## 2025-04-24 PROCEDURE — 87186 SC STD MICRODIL/AGAR DIL: CPT

## 2025-04-24 PROCEDURE — 87077 CULTURE AEROBIC IDENTIFY: CPT

## 2025-04-24 RX ORDER — PHENAZOPYRIDINE HYDROCHLORIDE 100 MG/1
100 TABLET, FILM COATED ORAL 3 TIMES DAILY PRN
Qty: 10 TABLET | Refills: 0 | Status: SHIPPED | OUTPATIENT
Start: 2025-04-24

## 2025-04-24 RX ORDER — CEPHALEXIN 500 MG/1
500 CAPSULE ORAL EVERY 12 HOURS SCHEDULED
Qty: 14 CAPSULE | Refills: 0 | Status: SHIPPED | OUTPATIENT
Start: 2025-04-24 | End: 2025-05-01

## 2025-04-24 NOTE — PROGRESS NOTES
[facility]        NAME: Agatha Villeda is a 80 y.o. female  : 1945    MRN: [id number]  DATE: 2025  TIME: 11:57 AM    Assessment and Plan   UTI symptoms [R39.9]  1. UTI symptoms  POCT urine dip    Urine culture    Urine culture        Urine dip - positive for signs of infection  Urine culture is pending - We will only notify you if there needs to be a change in your treatment plan.       Patient Instructions   Urine dip - positive for signs of infection  Urine culture is pending - We will only notify you if there needs to be a change in your treatment plan.     Take full course of Cephalexin as prescribed  Eat yogurt with live and active cultures and/or take a probiotic at least 3 hours before or after antibiotic dose. Monitor stool for diarrhea and/or blood. If this occurs, contact primary care doctor ASAP.     Take Phenazopyridine (after meals) as prescribed  Urine discoloration may occur with Phenazopyridine  Drink plenty of water   Cranberry supplements  Urinate within 5 minutes following intercourse  Follow up with OB/GYN  Always wipe front to back  Avoid holding in urine    Follow up with PCP in 3-5 days.  Proceed to  ER if symptoms worsen.    If tests are performed, our office will contact you with results only if changes need to made to the care plan discussed with you at the visit. You can review your full results on [facility].    Chief Complaint     Chief Complaint   Patient presents with   • Possible UTI     Urgency, freq, and burning with urination x 2 weeks          History of Present Illness       80 year old female arrives reporting increased urinary frequency, urgency and burning ongoing for past 2 weeks. Patient reports she did take over the counter Azo without relief.  Patient denies any abdominal pain, denies nausea, vomiting or diarrhea. Patient denies fevers.  Patient does report one episode of blood in urine while wiping but none at present.     Review  of Systems   Review of Systems   Constitutional: Negative.    HENT: Negative.     Respiratory: Negative.     Cardiovascular: Negative.    Gastrointestinal: Negative.    Genitourinary:  Positive for dysuria, frequency and urgency. Negative for vaginal bleeding and vaginal discharge.   Musculoskeletal: Negative.          Current Medications       Current Outpatient Medications:   •  acetaminophen (TYLENOL) 325 mg tablet, Take 2 tablets (650 mg total) by mouth every 4 (four) hours as needed for mild pain, headaches or fever, Disp: 30 tablet, Rfl: 0  •  alendronate (FOSAMAX) 70 mg tablet, take 1 tablet by mouth every 7 days, Disp: 4 tablet, Rfl: 5  •  amLODIPine (NORVASC) 5 mg tablet, Take 1 tablet (5 mg total) by mouth daily, Disp: 90 tablet, Rfl: 3  •  aspirin 81 mg chewable tablet, Chew 1 tablet (81 mg total) daily, Disp: 30 tablet, Rfl: 0  •  atorvastatin (LIPITOR) 40 mg tablet, take 1 tablet by mouth every evening, Disp: 90 tablet, Rfl: 1  •  Calcium Carbonate-Vit D-Min (CALCIUM 1200 PO), Take by mouth, Disp: , Rfl:   •  folic acid (FOLVITE) 800 MCG tablet, Take 800 mcg by mouth daily, Disp: , Rfl:   •  Iron-Vitamin C (Vitron-C)  MG TABS, Take 1 tablet by mouth every other day, Disp: , Rfl:   •  pantoprazole (PROTONIX) 40 mg tablet, take 1 tablet by mouth daily IN THE EARLY MORNING, Disp: 90 tablet, Rfl: 1  •  polyethylene glycol (MIRALAX) 17 g packet, Take 17 g by mouth daily as needed (Constipation), Disp: 10 each, Rfl: 0  •  vitamin B-12 (VITAMIN B-12) 1,000 mcg tablet, Take by mouth daily, Disp: , Rfl:   •  VITAMIN D PO, Take by mouth, Disp: , Rfl:     Current Allergies     Allergies as of 04/24/2025   • (No Known Allergies)            The following portions of the patient's history were reviewed and updated as appropriate: allergies, current medications, past family history, past medical history, past social history, past surgical history and problem list.     Past Medical History:   Diagnosis Date   •  Carotid artery narrowings    • Cataracts, bilateral    • Stroke (HCC)        Past Surgical History:   Procedure Laterality Date   • CARDIAC ELECTROPHYSIOLOGY PROCEDURE N/A 9/11/2024    Procedure: Cardiac loop recorder implant;  Surgeon: Seth Cadet MD;  Location: [facility];  Service: Cardiology   • CATARACT EXTRACTION Bilateral    • DENTAL SURGERY         Family History   Problem Relation Age of Onset   • Cancer Father          Medications have been verified.        Objective   /66   Pulse 105   Temp 98.1 °F (36.7 °C) (Tympanic)   Resp 18   Ht 5' (1.524 m)   Wt 50.5 kg (111 lb 6.4 oz)   SpO2 97%   BMI 21.76 kg/m²        Physical Exam     Physical Exam  Vitals and nursing note reviewed.   Constitutional:       General: She is not in acute distress.     Appearance: Normal appearance. She is not ill-appearing, toxic-appearing or diaphoretic.   HENT:      Head: Normocephalic.      Right Ear: External ear normal.      Left Ear: External ear normal.      Nose: Nose normal.   Eyes:      Pupils: Pupils are equal, round, and reactive to light.   Cardiovascular:      Rate and Rhythm: Normal rate and regular rhythm.      Pulses: Normal pulses.      Heart sounds: Normal heart sounds.   Pulmonary:      Effort: Pulmonary effort is normal. No respiratory distress.      Breath sounds: Normal breath sounds. No stridor. No wheezing, rhonchi or rales.   Chest:      Chest wall: No tenderness.   Abdominal:      General: Abdomen is flat. Bowel sounds are normal. There is no distension.      Palpations: Abdomen is soft. There is no mass.      Tenderness: There is no abdominal tenderness. There is no right CVA tenderness, left CVA tenderness, guarding or rebound.      Hernia: No hernia is present.   Musculoskeletal:         General: Normal range of motion.      Cervical back: Normal range of motion and neck supple.   Lymphadenopathy:      Cervical: No cervical adenopathy.   Skin:     General: Skin is warm and dry.       Capillary Refill: Capillary refill takes less than 2 seconds.   Neurological:      General: No focal deficit present.      Mental Status: She is alert and oriented to person, place, and time.   Psychiatric:         Mood and Affect: Mood normal.         Behavior: Behavior normal.

## 2025-04-24 NOTE — PATIENT INSTRUCTIONS
Urine dip - positive for signs of infection  Urine culture is pending - We will only notify you if there needs to be a change in your treatment plan.     Take full course of Cephalexin as prescribed  Eat yogurt with live and active cultures and/or take a probiotic at least 3 hours before or after antibiotic dose. Monitor stool for diarrhea and/or blood. If this occurs, contact primary care doctor ASAP.     Take Phenazopyridine (after meals) as prescribed  Urine discoloration may occur with Phenazopyridine  Drink plenty of water   Cranberry supplements  Urinate within 5 minutes following intercourse  Follow up with OB/GYN  Always wipe front to back  Avoid holding in urine    Follow up with PCP in 3-5 days.  Proceed to  ER if symptoms worsen.    If tests are performed, our office will contact you with results only if changes need to made to the care plan discussed with you at the visit. You can review your full results on [facility].

## 2025-04-25 ENCOUNTER — DOCUMENTATION (OUTPATIENT)
Dept: ADMINISTRATIVE | Facility: OTHER | Age: 80
End: 2025-04-25

## 2025-04-25 NOTE — PROGRESS NOTES
HENNA Salgado  P Patient Reported Team         Blood pressure elevated  Appointment department: [facility]  Appointment provider: HENNA Salgado  Blood pressure  04/24/25 1159 140/66  04/24/25 1140 149/66  04/25/25 9:40 AM    Patient was called after the Urgent Care visit  Patient has upcoming PCP appointment on 05/13. She is not experiencing any elevated BP symptoms. She does have a BP monitor but uses infrequently. UTI medications were started and she has relief from her symptoms. She is aware to reach out to PCP if needed.    Thank you.  Brown Paris MA  [facility]

## 2025-04-26 LAB — BACTERIA UR CULT: ABNORMAL

## 2025-05-09 ENCOUNTER — REMOTE DEVICE CLINIC VISIT (OUTPATIENT)
Dept: CARDIOLOGY CLINIC | Facility: CLINIC | Age: 80
End: 2025-05-09
Payer: MEDICARE

## 2025-05-09 ENCOUNTER — RESULTS FOLLOW-UP (OUTPATIENT)
Dept: NON INVASIVE DIAGNOSTICS | Facility: HOSPITAL | Age: 80
End: 2025-05-09

## 2025-05-09 DIAGNOSIS — I63.9 CRYPTOGENIC STROKE (HCC): Primary | ICD-10-CM

## 2025-05-09 PROCEDURE — 93298 REM INTERROG DEV EVAL SCRMS: CPT | Performed by: STUDENT IN AN ORGANIZED HEALTH CARE EDUCATION/TRAINING PROGRAM

## 2025-05-09 NOTE — PROGRESS NOTES
"MDT LNQ22 ILR/ ACTIVE SYSTEM IS MRI CONDITIONAL   CARELINK TRANSMISSION: LOOP RECORDER. PRESENTING RHYTHM NSR @ 86 BPM. BATTERY STATUS \"OK.\" 1 TACHY EPISODE PREVIOUSLY ADDRESSED IN ALERT. NO PATIENT OR NEW DEVICE ACTIVATED EPISODES. NORMAL DEVICE FUNCTION. NORMAL DEVICE FUNCTION. DL   "

## 2025-05-13 ENCOUNTER — OFFICE VISIT (OUTPATIENT)
Dept: FAMILY MEDICINE CLINIC | Facility: CLINIC | Age: 80
End: 2025-05-13
Payer: MEDICARE

## 2025-05-13 VITALS
SYSTOLIC BLOOD PRESSURE: 132 MMHG | WEIGHT: 110 LBS | BODY MASS INDEX: 21.6 KG/M2 | DIASTOLIC BLOOD PRESSURE: 80 MMHG | OXYGEN SATURATION: 92 % | HEIGHT: 60 IN | HEART RATE: 112 BPM | TEMPERATURE: 98.3 F | RESPIRATION RATE: 16 BRPM

## 2025-05-13 DIAGNOSIS — I10 PRIMARY HYPERTENSION: Primary | ICD-10-CM

## 2025-05-13 DIAGNOSIS — I63.9 STROKE (HCC): ICD-10-CM

## 2025-05-13 DIAGNOSIS — K21.9 GASTROESOPHAGEAL REFLUX DISEASE, UNSPECIFIED WHETHER ESOPHAGITIS PRESENT: ICD-10-CM

## 2025-05-13 PROCEDURE — 99214 OFFICE O/P EST MOD 30 MIN: CPT | Performed by: FAMILY MEDICINE

## 2025-05-13 PROCEDURE — G2211 COMPLEX E/M VISIT ADD ON: HCPCS | Performed by: FAMILY MEDICINE

## 2025-05-13 RX ORDER — PANTOPRAZOLE SODIUM 40 MG/1
40 TABLET, DELAYED RELEASE ORAL
Qty: 90 TABLET | Refills: 0 | Status: SHIPPED | OUTPATIENT
Start: 2025-05-13

## 2025-05-13 RX ORDER — ATORVASTATIN CALCIUM 40 MG/1
40 TABLET, FILM COATED ORAL EVERY EVENING
Qty: 90 TABLET | Refills: 0 | Status: SHIPPED | OUTPATIENT
Start: 2025-05-13

## 2025-05-13 RX ORDER — AMLODIPINE BESYLATE 5 MG/1
5 TABLET ORAL DAILY
Qty: 90 TABLET | Refills: 0 | Status: SHIPPED | OUTPATIENT
Start: 2025-05-13

## 2025-05-13 NOTE — PROGRESS NOTES
Name: Agatha Villeda      : 1945      MRN: [id number]  Encounter Provider: Nasrin Garcia MD  Encounter Date: 2025   Encounter department: [facility]  :  Assessment & Plan  Primary hypertension         Stroke (HCC)    Orders:    amLODIPine (NORVASC) 5 mg tablet; Take 1 tablet (5 mg total) by mouth daily    atorvastatin (LIPITOR) 40 mg tablet; Take 1 tablet (40 mg total) by mouth every evening    pantoprazole (PROTONIX) 40 mg tablet; Take 1 tablet (40 mg total) by mouth daily in the early morning    Gastroesophageal reflux disease, unspecified whether esophagitis present                History of Present Illness   HPI  80yof here forf follow up  States she is doing well, no complaints at this time  BP is good today  Doing well since hernia repair  Health maintenance reviewed  Well exam due in   Requesting refills of meds which are done today  Review of Systems   Constitutional: Negative.  Negative for chills and fever.   HENT: Negative.  Negative for ear pain and sore throat.    Eyes:  Negative for pain and visual disturbance.   Respiratory: Negative.  Negative for cough and shortness of breath.    Cardiovascular: Negative.  Negative for chest pain and palpitations.   Gastrointestinal: Negative.  Negative for abdominal pain and vomiting.   Genitourinary: Negative.  Negative for dysuria and hematuria.   Musculoskeletal:  Negative for arthralgias and back pain.   Skin:  Negative for color change and rash.   Neurological: Negative.  Negative for seizures and syncope.   Psychiatric/Behavioral: Negative.     All other systems reviewed and are negative.      Objective   /80 (BP Location: Left arm, Patient Position: Sitting, Cuff Size: Standard)   Pulse (!) 112   Temp 98.3 °F (36.8 °C) (Tympanic)   Resp 16   Ht 5' (1.524 m)   Wt 49.9 kg (110 lb)   SpO2 92%   BMI 21.48 kg/m²      Physical Exam  Vitals and nursing note reviewed.   Constitutional:       Appearance: Normal  appearance.   HENT:      Head: Normocephalic.   Eyes:      Pupils: Pupils are equal, round, and reactive to light.   Cardiovascular:      Rate and Rhythm: Normal rate and regular rhythm.      Pulses: Normal pulses.      Heart sounds: Murmur heard.   Pulmonary:      Effort: Pulmonary effort is normal. No respiratory distress.   Musculoskeletal:      Cervical back: Neck supple.   Neurological:      General: No focal deficit present.      Mental Status: She is alert and oriented to person, place, and time.   Psychiatric:         Mood and Affect: Mood normal.         Behavior: Behavior normal.

## 2025-05-14 ENCOUNTER — APPOINTMENT (OUTPATIENT)
Dept: LAB | Facility: CLINIC | Age: 80
End: 2025-05-14
Attending: PHYSICIAN ASSISTANT
Payer: MEDICARE

## 2025-05-14 DIAGNOSIS — D53.9 NUTRITIONAL ANEMIA, UNSPECIFIED: ICD-10-CM

## 2025-05-14 DIAGNOSIS — D75.839 THROMBOCYTOSIS: ICD-10-CM

## 2025-05-14 DIAGNOSIS — D72.828 NEUTROPHILIA: ICD-10-CM

## 2025-05-14 LAB
ALBUMIN SERPL BCG-MCNC: 4.3 G/DL (ref 3.5–5)
ALP SERPL-CCNC: 71 U/L (ref 34–104)
ALT SERPL W P-5'-P-CCNC: 9 U/L (ref 7–52)
ANION GAP SERPL CALCULATED.3IONS-SCNC: 11 MMOL/L (ref 4–13)
AST SERPL W P-5'-P-CCNC: 18 U/L (ref 13–39)
BASOPHILS # BLD AUTO: 0.07 THOUSANDS/ÂΜL (ref 0–0.1)
BASOPHILS NFR BLD AUTO: 1 % (ref 0–1)
BILIRUB SERPL-MCNC: 0.38 MG/DL (ref 0.2–1)
BUN SERPL-MCNC: 16 MG/DL (ref 5–25)
CALCIUM SERPL-MCNC: 10.1 MG/DL (ref 8.4–10.2)
CHLORIDE SERPL-SCNC: 103 MMOL/L (ref 96–108)
CO2 SERPL-SCNC: 27 MMOL/L (ref 21–32)
CREAT SERPL-MCNC: 0.76 MG/DL (ref 0.6–1.3)
CRP SERPL QL: 5.6 MG/L
EOSINOPHIL # BLD AUTO: 0.17 THOUSAND/ÂΜL (ref 0–0.61)
EOSINOPHIL NFR BLD AUTO: 2 % (ref 0–6)
ERYTHROCYTE [DISTWIDTH] IN BLOOD BY AUTOMATED COUNT: 14.7 % (ref 11.6–15.1)
ERYTHROCYTE [SEDIMENTATION RATE] IN BLOOD: 40 MM/HOUR (ref 0–29)
FERRITIN SERPL-MCNC: 63 NG/ML (ref 30–307)
GFR SERPL CREATININE-BSD FRML MDRD: 74 ML/MIN/1.73SQ M
GLUCOSE P FAST SERPL-MCNC: 92 MG/DL (ref 65–99)
HCT VFR BLD AUTO: 38.7 % (ref 34.8–46.1)
HGB BLD-MCNC: 12 G/DL (ref 11.5–15.4)
IMM GRANULOCYTES # BLD AUTO: 0.03 THOUSAND/UL (ref 0–0.2)
IMM GRANULOCYTES NFR BLD AUTO: 0 % (ref 0–2)
IRON SATN MFR SERPL: 15 % (ref 15–50)
IRON SERPL-MCNC: 53 UG/DL (ref 50–212)
LYMPHOCYTES # BLD AUTO: 1.28 THOUSANDS/ÂΜL (ref 0.6–4.47)
LYMPHOCYTES NFR BLD AUTO: 16 % (ref 14–44)
MCH RBC QN AUTO: 30.8 PG (ref 26.8–34.3)
MCHC RBC AUTO-ENTMCNC: 31 G/DL (ref 31.4–37.4)
MCV RBC AUTO: 100 FL (ref 82–98)
MONOCYTES # BLD AUTO: 0.65 THOUSAND/ÂΜL (ref 0.17–1.22)
MONOCYTES NFR BLD AUTO: 8 % (ref 4–12)
NEUTROPHILS # BLD AUTO: 5.86 THOUSANDS/ÂΜL (ref 1.85–7.62)
NEUTS SEG NFR BLD AUTO: 73 % (ref 43–75)
NRBC BLD AUTO-RTO: 0 /100 WBCS
PLATELET # BLD AUTO: 385 THOUSANDS/UL (ref 149–390)
PMV BLD AUTO: 9.9 FL (ref 8.9–12.7)
POTASSIUM SERPL-SCNC: 4.1 MMOL/L (ref 3.5–5.3)
PROT SERPL-MCNC: 7.4 G/DL (ref 6.4–8.4)
RBC # BLD AUTO: 3.89 MILLION/UL (ref 3.81–5.12)
RETICS # AUTO: NORMAL 10*3/UL (ref 14097–95744)
RETICS # CALC: 1.8 % (ref 0.37–1.87)
SODIUM SERPL-SCNC: 141 MMOL/L (ref 135–147)
TIBC SERPL-MCNC: 355.6 UG/DL (ref 250–450)
TRANSFERRIN SERPL-MCNC: 254 MG/DL (ref 203–362)
UIBC SERPL-MCNC: 303 UG/DL (ref 155–355)
WBC # BLD AUTO: 8.06 THOUSAND/UL (ref 4.31–10.16)

## 2025-05-14 PROCEDURE — 82728 ASSAY OF FERRITIN: CPT

## 2025-05-14 PROCEDURE — 36415 COLL VENOUS BLD VENIPUNCTURE: CPT

## 2025-05-14 PROCEDURE — 86140 C-REACTIVE PROTEIN: CPT

## 2025-05-14 PROCEDURE — 83540 ASSAY OF IRON: CPT

## 2025-05-14 PROCEDURE — 85045 AUTOMATED RETICULOCYTE COUNT: CPT

## 2025-05-14 PROCEDURE — 83550 IRON BINDING TEST: CPT

## 2025-05-14 PROCEDURE — 80053 COMPREHEN METABOLIC PANEL: CPT

## 2025-05-14 PROCEDURE — 85652 RBC SED RATE AUTOMATED: CPT

## 2025-05-14 PROCEDURE — 85025 COMPLETE CBC W/AUTO DIFF WBC: CPT

## 2025-05-29 ENCOUNTER — OFFICE VISIT (OUTPATIENT)
Dept: HEMATOLOGY ONCOLOGY | Facility: CLINIC | Age: 80
End: 2025-05-29
Payer: MEDICARE

## 2025-05-29 VITALS
WEIGHT: 111.5 LBS | SYSTOLIC BLOOD PRESSURE: 142 MMHG | TEMPERATURE: 98.4 F | HEART RATE: 68 BPM | HEIGHT: 61 IN | BODY MASS INDEX: 21.05 KG/M2 | OXYGEN SATURATION: 99 % | DIASTOLIC BLOOD PRESSURE: 82 MMHG | RESPIRATION RATE: 17 BRPM

## 2025-05-29 DIAGNOSIS — D75.839 THROMBOCYTOSIS: ICD-10-CM

## 2025-05-29 DIAGNOSIS — D50.9 IRON DEFICIENCY ANEMIA, UNSPECIFIED IRON DEFICIENCY ANEMIA TYPE: Primary | ICD-10-CM

## 2025-05-29 DIAGNOSIS — D72.828 NEUTROPHILIA: ICD-10-CM

## 2025-05-29 PROCEDURE — 99213 OFFICE O/P EST LOW 20 MIN: CPT | Performed by: PHYSICIAN ASSISTANT

## 2025-05-29 PROCEDURE — G2211 COMPLEX E/M VISIT ADD ON: HCPCS | Performed by: PHYSICIAN ASSISTANT

## 2025-05-29 NOTE — PROGRESS NOTES
Name: Agatha Villeda      : 1945      MRN: [id number]  Encounter Provider: Amy Montgomery PA-C  Encounter Date: 2025   Encounter department: [facility] [place]  :  Assessment & Plan  Iron deficiency anemia, unspecified iron deficiency anemia type  This is an 80-year-old female who underwent surgical intervention and had subsequent mild iron deficiency which resulted in abnormalities within the patient's CBC.  Patient was able to have this corrected.  Patient is tolerating Vitron-C without significant toxicity.  Patient should continue this on a 3 times a week basis.  No need for specific hematologic follow-up.  Patient will follow-up with primary care doctor for regular monitoring of blood test.    As always, I remain available to the patient and to her provider should a another issue arise in the future.       Neutrophilia  Patient has had intermittent neutrophilia in response to inflammatory issues/surgical interventions.  Patient's blood counts have responded adequately over time.  Hemoglobin and hematocrit are stable.  White blood cell count is normal.      With cell lines being adequately recovered, no specific need for follow-up with hematology at this time.  In the future with development of macrocytic anemia or other cell line deficits, referral back to hematology would be recommended.       Thrombocytosis  Combination between iron deficiency and inflammatory features.    At present, patient's platelet count is back to baseline at 385.  No need for specific follow-up with hematology at this time.       Return if symptoms worsen or fail to improve.    History of Present Illness   Chief Complaint   Patient presents with   • Follow-up     3 MO FU     Pertinent Medical History   This is a 79-year-old female with past medical history of CVA in May 2024, profound anemia in May 2024 with carotid artery narrowings and bilateral cataracts who presents to  hematology for evaluation of thrombocytosis.     Prior to May 2024 patient enjoyed excellent health, did not follow-up with a physician routinely due to lack of medical concerns.  Patient active living at home, has 2 sisters 181 years of age and another 1 slightly younger.  Patient has good social support.      Hospitalization, patient had changes to bowels, melena noted upon review today.     In May 2024 patient presented to the emergency room with right leg weakness that began 3 days prior.  MRI revealed multifocal acute/subacute infarcts involving bilateral frontal parietal and occipital lobes.  On admission patient's hemoglobin was noted to be 6.7 and after hydration dropped to 5.8.  Patient received 2 packed red blood cells along with Venofer 400 mg-total without side effects.              5/10/2024 ferritin 19, iron saturation 2%, TIBC 682                          WBC 9.6, hemoglobin 6.7, MCV 70, RDW 17.8, platelet count 779  EDG during admission found  large type III hiatal hernia with moderate edematous, scarred, ulcerated mucosa with erosion in the esophagus.  Colonoscopy was benign.     6/14/2024 WBC 10.7, hemoglobin 11.6, MCV 80, RDW 30.5, platelet count 272  07/09/24: Patient notes no residual issues with CVA.  Patient underwent rehab and is feeling quite well.  Patient does admit to melena in the past however no melena since discharge from hospital.  Patient is tolerating Sucarafate without significant toxicity.  Patient has not been taking oral iron outpatient, never advised likely secondary to sick Carafate and upper GI disturbances.     7/12/2024 WBC 8.5, hemoglobin 12.7, platelet count 336, neutrophil 6.5              Ferritin 71, B12 361, folate 8              Recommendation: B12 3000 gummy daily + folic acid 800     10/21/2024: WBC 10.2, hemoglobin 13.3, MCV 98, platelet count 361, ANC 8.4, other differential WNL  ferritin = 36, iron saturation 20%, TIBC 381              B12 1554, greater than  "22.3              TSH 1.18  10/31/2024 large herniation about the GE junction and stomach causing a degree of torsion.    Patchy erosions inside the hiatal hernia esophageal stomach duodenal bulb first part of duodenum normal.    1/22 through 1/24/2025 admission for hiatal hernia repair.  2/14/2025 WBC 15.8, hemoglobin 12.4, , platelet count 407  02/25/25: Patient felt well after surgery.  Generally feeling well no side effects of taking oral iron Vitron-C daily.  3/12/2025 BC 10.5, hemoglobin 12.5, , platelet count 452, ANC 8.01  5/14/2025 WBC 8.06, hemoglobin 12.0, , platelet count 385, neutrophil count 5.86   Ferritin 63, TIBC 355, iron saturation 15%   CRP 5.6, sed rate 40, reticulocyte 1.8   UN 16, creatinine 0.76, calcium 10.1, LFTs WNL, EGFR 74    05/29/25: feeling well!  No issues tolerating oral iron , no stomach side effects.     Review of Systems   All other systems reviewed and are negative.          Objective   /82 (BP Location: Left arm, Patient Position: Sitting, Cuff Size: Adult)   Pulse 68   Temp 98.4 °F (36.9 °C) (Temporal)   Resp 17   Ht 5' 1\" (1.549 m)   Wt 50.6 kg (111 lb 8 oz)   SpO2 99%   BMI 21.07 kg/m²     Physical Exam  Constitutional:       General: She is not in acute distress.     Appearance: She is well-developed and normal weight. She is not ill-appearing or toxic-appearing.   HENT:      Head: Normocephalic and atraumatic.     Eyes:      General: No scleral icterus.     Conjunctiva/sclera: Conjunctivae normal.       Cardiovascular:      Rate and Rhythm: Normal rate.   Pulmonary:      Effort: Pulmonary effort is normal. No respiratory distress.     Skin:     General: Skin is warm.      Coloration: Skin is not pale.      Findings: No rash.     Neurological:      Mental Status: She is alert and oriented to person, place, and time.     Psychiatric:         Behavior: Behavior is cooperative.         Thought Content: Thought content normal.         Labs: I " have reviewed the following labs:  Results for orders placed or performed in visit on 05/14/25   CBC and differential   Result Value Ref Range    WBC 8.06 4.31 - 10.16 Thousand/uL    RBC 3.89 3.81 - 5.12 Million/uL    Hemoglobin 12.0 11.5 - 15.4 g/dL    Hematocrit 38.7 34.8 - 46.1 %     (H) 82 - 98 fL    MCH 30.8 26.8 - 34.3 pg    MCHC 31.0 (L) 31.4 - 37.4 g/dL    RDW 14.7 11.6 - 15.1 %    MPV 9.9 8.9 - 12.7 fL    Platelets 385 149 - 390 Thousands/uL    nRBC 0 /100 WBCs    Segmented % 73 43 - 75 %    Immature Grans % 0 0 - 2 %    Lymphocytes % 16 14 - 44 %    Monocytes % 8 4 - 12 %    Eosinophils Relative 2 0 - 6 %    Basophils Relative 1 0 - 1 %    Absolute Neutrophils 5.86 1.85 - 7.62 Thousands/µL    Absolute Immature Grans 0.03 0.00 - 0.20 Thousand/uL    Absolute Lymphocytes 1.28 0.60 - 4.47 Thousands/µL    Absolute Monocytes 0.65 0.17 - 1.22 Thousand/µL    Eosinophils Absolute 0.17 0.00 - 0.61 Thousand/µL    Basophils Absolute 0.07 0.00 - 0.10 Thousands/µL   Comprehensive metabolic panel   Result Value Ref Range    Sodium 141 135 - 147 mmol/L    Potassium 4.1 3.5 - 5.3 mmol/L    Chloride 103 96 - 108 mmol/L    CO2 27 21 - 32 mmol/L    ANION GAP 11 4 - 13 mmol/L    BUN 16 5 - 25 mg/dL    Creatinine 0.76 0.60 - 1.30 mg/dL    Glucose, Fasting 92 65 - 99 mg/dL    Calcium 10.1 8.4 - 10.2 mg/dL    AST 18 13 - 39 U/L    ALT 9 7 - 52 U/L    Alkaline Phosphatase 71 34 - 104 U/L    Total Protein 7.4 6.4 - 8.4 g/dL    Albumin 4.3 3.5 - 5.0 g/dL    Total Bilirubin 0.38 0.20 - 1.00 mg/dL    eGFR 74 ml/min/1.73sq m   Result Value Ref Range    CRP 5.6 (H) <3.0 mg/L   Sedimentation rate, automated   Result Value Ref Range    Sed Rate 40 (H) 0 - 29 mm/hour   Retic Count   Result Value Ref Range    Retic Ct Abs 70,000 14,097 - 95,744    Retic Ct Pct 1.80 0.37 - 1.87 %   TIBC Panel (incl. Iron, TIBC, % Iron Saturation)   Result Value Ref Range    Iron Saturation 15 15 - 50 %    TIBC 355.6 250 - 450 ug/dL    Iron 53 50 - 212  ug/dL    Transferrin 254 203 - 362 mg/dL    UIBC 303 155 - 355 ug/dL   Result Value Ref Range    Ferritin 63 30 - 307 ng/mL           Administrative Statements   I have spent a total time of 22 minutes in caring for this patient on the day of the visit/encounter including Instructions for management, Risk factor reductions, Counseling / Coordination of care, Documenting in the medical record, and Obtaining or reviewing history  .

## 2025-05-29 NOTE — PATIENT INSTRUCTIONS
[facility] Medical Oncology and Hematology Team  [facility] Line - [phone]    Your Team Member:  Advanced Practitioner:  Amy Montgomery PA-C    Please answer Private and Unavailable Calls - this may be your team(s) contacting you.  If you have medical questions/concerns/issues - contact us either by (1) My Chart (2) Hope Line

## 2025-05-29 NOTE — ASSESSMENT & PLAN NOTE
Combination between iron deficiency and inflammatory features.    At present, patient's platelet count is back to baseline at 385.  No need for specific follow-up with hematology at this time.

## 2025-07-01 NOTE — ASSESSMENT & PLAN NOTE
Noted to have hgb 5.8  No overt bleeding  No previous hgb so may not be acute considering low mcv  Received 2 units packed red blood cells with appropriate compensation  Hemoglobin now 9  No overt bleeding  Noted to have severe iron deficiency anemia  No previous colonoscopy/endoscopy  Will consult GI for hopeful colonoscopy/endoscopy on Monday  Hemoglobin is since stabilized  Follow-up GI consultation   You can access the FollowMyHealth Patient Portal offered by [facility] by registering at the following website: [email or web address]. By joining [employer]’s FollowMyHealth portal, you will also be able to view your health information using other applications (apps) compatible with our system.

## 2025-08-08 ENCOUNTER — REMOTE DEVICE CLINIC VISIT (OUTPATIENT)
Dept: CARDIOLOGY CLINIC | Facility: CLINIC | Age: 80
End: 2025-08-08
Payer: MEDICARE

## 2025-08-08 DIAGNOSIS — I63.9 CRYPTOGENIC STROKE (HCC): Primary | ICD-10-CM

## 2025-08-08 PROCEDURE — 93298 REM INTERROG DEV EVAL SCRMS: CPT | Performed by: INTERNAL MEDICINE
